# Patient Record
Sex: FEMALE | Race: ASIAN | NOT HISPANIC OR LATINO | URBAN - METROPOLITAN AREA
[De-identification: names, ages, dates, MRNs, and addresses within clinical notes are randomized per-mention and may not be internally consistent; named-entity substitution may affect disease eponyms.]

---

## 2017-07-26 ENCOUNTER — INPATIENT (INPATIENT)
Facility: HOSPITAL | Age: 82
LOS: 0 days | Discharge: HOME | End: 2017-07-27
Attending: INTERNAL MEDICINE | Admitting: INTERNAL MEDICINE
Payer: MEDICARE

## 2017-07-26 VITALS
RESPIRATION RATE: 18 BRPM | SYSTOLIC BLOOD PRESSURE: 180 MMHG | OXYGEN SATURATION: 98 % | TEMPERATURE: 98 F | HEART RATE: 78 BPM | DIASTOLIC BLOOD PRESSURE: 75 MMHG

## 2017-07-26 DIAGNOSIS — R06.02 SHORTNESS OF BREATH: ICD-10-CM

## 2017-07-26 DIAGNOSIS — I48.91 UNSPECIFIED ATRIAL FIBRILLATION: ICD-10-CM

## 2017-07-26 DIAGNOSIS — I25.10 ATHEROSCLEROTIC HEART DISEASE OF NATIVE CORONARY ARTERY WITHOUT ANGINA PECTORIS: ICD-10-CM

## 2017-07-26 DIAGNOSIS — I10 ESSENTIAL (PRIMARY) HYPERTENSION: ICD-10-CM

## 2017-07-26 DIAGNOSIS — E03.9 HYPOTHYROIDISM, UNSPECIFIED: ICD-10-CM

## 2017-07-26 DIAGNOSIS — E78.5 HYPERLIPIDEMIA, UNSPECIFIED: ICD-10-CM

## 2017-07-26 LAB
ALBUMIN SERPL ELPH-MCNC: 3.6 G/DL — SIGNIFICANT CHANGE UP (ref 3.3–5)
ALP SERPL-CCNC: 103 U/L — SIGNIFICANT CHANGE UP (ref 40–120)
ALT FLD-CCNC: 11 U/L — SIGNIFICANT CHANGE UP (ref 4–33)
ANISOCYTOSIS BLD QL: SLIGHT — SIGNIFICANT CHANGE UP
APPEARANCE UR: CLEAR — SIGNIFICANT CHANGE UP
APTT BLD: 35.9 SEC — SIGNIFICANT CHANGE UP (ref 27.5–37.4)
AST SERPL-CCNC: 22 U/L — SIGNIFICANT CHANGE UP (ref 4–32)
BACTERIA # UR AUTO: SIGNIFICANT CHANGE UP
BASE EXCESS BLDV CALC-SCNC: 1.3 MMOL/L — SIGNIFICANT CHANGE UP
BASOPHILS # BLD AUTO: 0.03 K/UL — SIGNIFICANT CHANGE UP (ref 0–0.2)
BASOPHILS NFR BLD AUTO: 0.6 % — SIGNIFICANT CHANGE UP (ref 0–2)
BASOPHILS NFR SPEC: 4.3 % — HIGH (ref 0–2)
BILIRUB SERPL-MCNC: 0.5 MG/DL — SIGNIFICANT CHANGE UP (ref 0.2–1.2)
BILIRUB UR-MCNC: NEGATIVE — SIGNIFICANT CHANGE UP
BLASTS # FLD: 0 % — SIGNIFICANT CHANGE UP (ref 0–0)
BLD GP AB SCN SERPL QL: NEGATIVE — SIGNIFICANT CHANGE UP
BLOOD GAS VENOUS - CREATININE: 0.74 MG/DL — SIGNIFICANT CHANGE UP (ref 0.5–1.3)
BLOOD UR QL VISUAL: NEGATIVE — SIGNIFICANT CHANGE UP
BUN SERPL-MCNC: 21 MG/DL — SIGNIFICANT CHANGE UP (ref 7–23)
CALCIUM SERPL-MCNC: 8.8 MG/DL — SIGNIFICANT CHANGE UP (ref 8.4–10.5)
CHLORIDE BLDV-SCNC: 108 MMOL/L — SIGNIFICANT CHANGE UP (ref 96–108)
CHLORIDE SERPL-SCNC: 105 MMOL/L — SIGNIFICANT CHANGE UP (ref 98–107)
CK MB BLD-MCNC: 2.05 NG/ML — SIGNIFICANT CHANGE UP (ref 1–4.7)
CK MB BLD-MCNC: 2.68 NG/ML — SIGNIFICANT CHANGE UP (ref 1–4.7)
CK MB BLD-MCNC: SIGNIFICANT CHANGE UP (ref 0–2.5)
CK MB BLD-MCNC: SIGNIFICANT CHANGE UP (ref 0–2.5)
CK SERPL-CCNC: 42 U/L — SIGNIFICANT CHANGE UP (ref 25–170)
CK SERPL-CCNC: 50 U/L — SIGNIFICANT CHANGE UP (ref 25–170)
CO2 SERPL-SCNC: 25 MMOL/L — SIGNIFICANT CHANGE UP (ref 22–31)
COLOR SPEC: SIGNIFICANT CHANGE UP
CREAT SERPL-MCNC: 0.71 MG/DL — SIGNIFICANT CHANGE UP (ref 0.5–1.3)
EOSINOPHIL # BLD AUTO: 0.09 K/UL — SIGNIFICANT CHANGE UP (ref 0–0.5)
EOSINOPHIL NFR BLD AUTO: 1.8 % — SIGNIFICANT CHANGE UP (ref 0–6)
EOSINOPHIL NFR FLD: 0.9 % — SIGNIFICANT CHANGE UP (ref 0–6)
FERRITIN SERPL-MCNC: 13.18 NG/ML — LOW (ref 15–150)
FOLATE SERPL-MCNC: 11.9 NG/ML — SIGNIFICANT CHANGE UP (ref 4.7–20)
GAS PNL BLDV: 138 MMOL/L — SIGNIFICANT CHANGE UP (ref 136–146)
GIANT PLATELETS BLD QL SMEAR: PRESENT — SIGNIFICANT CHANGE UP
GLUCOSE BLDV-MCNC: 101 — HIGH (ref 70–99)
GLUCOSE SERPL-MCNC: 100 MG/DL — HIGH (ref 70–99)
GLUCOSE UR-MCNC: NEGATIVE — SIGNIFICANT CHANGE UP
HAPTOGLOB SERPL-MCNC: 108 MG/DL — SIGNIFICANT CHANGE UP (ref 34–200)
HBA1C BLD-MCNC: 5.8 % — HIGH (ref 4–5.6)
HCO3 BLDV-SCNC: 25 MMOL/L — SIGNIFICANT CHANGE UP (ref 20–27)
HCT VFR BLD CALC: 20.7 % — CRITICAL LOW (ref 34.5–45)
HCT VFR BLD CALC: 27.3 % — LOW (ref 34.5–45)
HCT VFR BLDV CALC: 19.4 % — CRITICAL LOW (ref 34.5–45)
HGB BLD-MCNC: 5.8 G/DL — CRITICAL LOW (ref 11.5–15.5)
HGB BLD-MCNC: 7.9 G/DL — LOW (ref 11.5–15.5)
HGB BLDV-MCNC: 6.2 G/DL — CRITICAL LOW (ref 11.5–15.5)
HYPOCHROMIA BLD QL: SIGNIFICANT CHANGE UP
IMM GRANULOCYTES # BLD AUTO: 0.02 # — SIGNIFICANT CHANGE UP
IMM GRANULOCYTES NFR BLD AUTO: 0.4 % — SIGNIFICANT CHANGE UP (ref 0–1.5)
INR BLD: 1.52 — HIGH (ref 0.88–1.17)
IRON SATN MFR SERPL: 51 UG/DL — SIGNIFICANT CHANGE UP (ref 30–160)
IRON SATN MFR SERPL: 513 UG/DL — SIGNIFICANT CHANGE UP (ref 140–530)
KETONES UR-MCNC: NEGATIVE — SIGNIFICANT CHANGE UP
LACTATE BLDV-MCNC: 1.7 MMOL/L — SIGNIFICANT CHANGE UP (ref 0.5–2)
LDH SERPL L TO P-CCNC: 204 U/L — SIGNIFICANT CHANGE UP (ref 135–225)
LEUKOCYTE ESTERASE UR-ACNC: NEGATIVE — SIGNIFICANT CHANGE UP
LYMPHOCYTES # BLD AUTO: 1.17 K/UL — SIGNIFICANT CHANGE UP (ref 1–3.3)
LYMPHOCYTES # BLD AUTO: 23.9 % — SIGNIFICANT CHANGE UP (ref 13–44)
LYMPHOCYTES NFR SPEC AUTO: 12.2 % — LOW (ref 13–44)
MAGNESIUM SERPL-MCNC: 1.7 MG/DL — SIGNIFICANT CHANGE UP (ref 1.6–2.6)
MCHC RBC-ENTMCNC: 17.7 PG — LOW (ref 27–34)
MCHC RBC-ENTMCNC: 18.5 PG — LOW (ref 27–34)
MCHC RBC-ENTMCNC: 28 % — LOW (ref 32–36)
MCHC RBC-ENTMCNC: 28.9 % — LOW (ref 32–36)
MCV RBC AUTO: 63.1 FL — LOW (ref 80–100)
MCV RBC AUTO: 64.1 FL — LOW (ref 80–100)
METAMYELOCYTES # FLD: 0 % — SIGNIFICANT CHANGE UP (ref 0–1)
MICROCYTES BLD QL: SIGNIFICANT CHANGE UP
MONOCYTES # BLD AUTO: 0.48 K/UL — SIGNIFICANT CHANGE UP (ref 0–0.9)
MONOCYTES NFR BLD AUTO: 9.8 % — SIGNIFICANT CHANGE UP (ref 2–14)
MONOCYTES NFR BLD: 2.6 % — SIGNIFICANT CHANGE UP (ref 2–9)
MUCOUS THREADS # UR AUTO: SIGNIFICANT CHANGE UP
MYELOCYTES NFR BLD: 0 % — SIGNIFICANT CHANGE UP (ref 0–0)
NEUTROPHIL AB SER-ACNC: 80 % — HIGH (ref 43–77)
NEUTROPHILS # BLD AUTO: 3.11 K/UL — SIGNIFICANT CHANGE UP (ref 1.8–7.4)
NEUTROPHILS NFR BLD AUTO: 63.5 % — SIGNIFICANT CHANGE UP (ref 43–77)
NEUTS BAND # BLD: 0 % — SIGNIFICANT CHANGE UP (ref 0–6)
NITRITE UR-MCNC: POSITIVE — HIGH
NRBC # FLD: 0 — SIGNIFICANT CHANGE UP
NRBC # FLD: 0 — SIGNIFICANT CHANGE UP
NT-PROBNP SERPL-SCNC: 2905 PG/ML — SIGNIFICANT CHANGE UP
OB PNL STL: NEGATIVE — SIGNIFICANT CHANGE UP
OTHER - HEMATOLOGY %: 0 — SIGNIFICANT CHANGE UP
PCO2 BLDV: 48 MMHG — SIGNIFICANT CHANGE UP (ref 41–51)
PH BLDV: 7.36 PH — SIGNIFICANT CHANGE UP (ref 7.32–7.43)
PH UR: 7 — SIGNIFICANT CHANGE UP (ref 4.6–8)
PLATELET # BLD AUTO: 210 K/UL — SIGNIFICANT CHANGE UP (ref 150–400)
PLATELET # BLD AUTO: 213 K/UL — SIGNIFICANT CHANGE UP (ref 150–400)
PLATELET COUNT - ESTIMATE: NORMAL — SIGNIFICANT CHANGE UP
PMV BLD: 10.3 FL — SIGNIFICANT CHANGE UP (ref 7–13)
PMV BLD: 9.7 FL — SIGNIFICANT CHANGE UP (ref 7–13)
PO2 BLDV: 24 MMHG — LOW (ref 35–40)
POIKILOCYTOSIS BLD QL AUTO: SLIGHT — SIGNIFICANT CHANGE UP
POLYCHROMASIA BLD QL SMEAR: SLIGHT — SIGNIFICANT CHANGE UP
POTASSIUM BLDV-SCNC: 4 MMOL/L — SIGNIFICANT CHANGE UP (ref 3.4–4.5)
POTASSIUM SERPL-MCNC: 4.3 MMOL/L — SIGNIFICANT CHANGE UP (ref 3.5–5.3)
POTASSIUM SERPL-SCNC: 4.3 MMOL/L — SIGNIFICANT CHANGE UP (ref 3.5–5.3)
PROMYELOCYTES # FLD: 0 % — SIGNIFICANT CHANGE UP (ref 0–0)
PROT SERPL-MCNC: 7.6 G/DL — SIGNIFICANT CHANGE UP (ref 6–8.3)
PROT UR-MCNC: 10 — SIGNIFICANT CHANGE UP
PROTHROM AB SERPL-ACNC: 17.2 SEC — HIGH (ref 9.8–13.1)
RBC # BLD: 3.28 M/UL — LOW (ref 3.8–5.2)
RBC # BLD: 4.26 M/UL — SIGNIFICANT CHANGE UP (ref 3.8–5.2)
RBC # FLD: 20.9 % — HIGH (ref 10.3–14.5)
RBC # FLD: 23.9 % — HIGH (ref 10.3–14.5)
RBC CASTS # UR COMP ASSIST: SIGNIFICANT CHANGE UP (ref 0–?)
RETICS/RBC NFR: 1 % — SIGNIFICANT CHANGE UP (ref 0.5–2.5)
REVIEW TO FOLLOW: YES — SIGNIFICANT CHANGE UP
RH IG SCN BLD-IMP: POSITIVE — SIGNIFICANT CHANGE UP
RH IG SCN BLD-IMP: POSITIVE — SIGNIFICANT CHANGE UP
SAO2 % BLDV: 28.9 % — LOW (ref 60–85)
SODIUM SERPL-SCNC: 140 MMOL/L — SIGNIFICANT CHANGE UP (ref 135–145)
SP GR SPEC: 1.01 — SIGNIFICANT CHANGE UP (ref 1–1.03)
SQUAMOUS # UR AUTO: SIGNIFICANT CHANGE UP
TARGETS BLD QL SMEAR: SIGNIFICANT CHANGE UP
TROPONIN T SERPL-MCNC: < 0.06 NG/ML — SIGNIFICANT CHANGE UP (ref 0–0.06)
TROPONIN T SERPL-MCNC: < 0.06 NG/ML — SIGNIFICANT CHANGE UP (ref 0–0.06)
TSH SERPL-MCNC: 0.62 UIU/ML — SIGNIFICANT CHANGE UP (ref 0.27–4.2)
UIBC SERPL-MCNC: 462 UG/DL — HIGH (ref 110–370)
UROBILINOGEN FLD QL: NORMAL E.U. — SIGNIFICANT CHANGE UP (ref 0.1–0.2)
VARIANT LYMPHS # BLD: 0 % — SIGNIFICANT CHANGE UP
WBC # BLD: 4.9 K/UL — SIGNIFICANT CHANGE UP (ref 3.8–10.5)
WBC # BLD: 5.22 K/UL — SIGNIFICANT CHANGE UP (ref 3.8–10.5)
WBC # FLD AUTO: 4.9 K/UL — SIGNIFICANT CHANGE UP (ref 3.8–10.5)
WBC # FLD AUTO: 5.22 K/UL — SIGNIFICANT CHANGE UP (ref 3.8–10.5)
WBC UR QL: SIGNIFICANT CHANGE UP (ref 0–?)

## 2017-07-26 PROCEDURE — 71010: CPT | Mod: 26

## 2017-07-26 RX ORDER — DIVALPROEX SODIUM 500 MG/1
250 TABLET, DELAYED RELEASE ORAL
Qty: 0 | Refills: 0 | COMMUNITY

## 2017-07-26 RX ORDER — ACETAMINOPHEN 500 MG
650 TABLET ORAL EVERY 6 HOURS
Qty: 0 | Refills: 0 | Status: DISCONTINUED | OUTPATIENT
Start: 2017-07-26 | End: 2017-07-27

## 2017-07-26 RX ORDER — FUROSEMIDE 40 MG
1 TABLET ORAL
Qty: 0 | Refills: 0 | COMMUNITY

## 2017-07-26 RX ORDER — LABETALOL HCL 100 MG
10 TABLET ORAL ONCE
Qty: 0 | Refills: 0 | Status: COMPLETED | OUTPATIENT
Start: 2017-07-26 | End: 2017-07-26

## 2017-07-26 RX ORDER — ATORVASTATIN CALCIUM 80 MG/1
20 TABLET, FILM COATED ORAL AT BEDTIME
Qty: 0 | Refills: 0 | Status: DISCONTINUED | OUTPATIENT
Start: 2017-07-26 | End: 2017-07-27

## 2017-07-26 RX ORDER — LABETALOL HCL 100 MG
1 TABLET ORAL
Qty: 0 | Refills: 0 | COMMUNITY

## 2017-07-26 RX ORDER — CEFTRIAXONE 500 MG/1
1 INJECTION, POWDER, FOR SOLUTION INTRAMUSCULAR; INTRAVENOUS EVERY 24 HOURS
Qty: 0 | Refills: 0 | Status: DISCONTINUED | OUTPATIENT
Start: 2017-07-27 | End: 2017-07-27

## 2017-07-26 RX ORDER — FUROSEMIDE 40 MG
40 TABLET ORAL ONCE
Qty: 0 | Refills: 0 | Status: COMPLETED | OUTPATIENT
Start: 2017-07-26 | End: 2017-07-26

## 2017-07-26 RX ORDER — CEFTRIAXONE 500 MG/1
1 INJECTION, POWDER, FOR SOLUTION INTRAMUSCULAR; INTRAVENOUS ONCE
Qty: 0 | Refills: 0 | Status: COMPLETED | OUTPATIENT
Start: 2017-07-26 | End: 2017-07-26

## 2017-07-26 RX ORDER — ERGOCALCIFEROL 1.25 MG/1
50000 CAPSULE ORAL
Qty: 0 | Refills: 0 | Status: DISCONTINUED | OUTPATIENT
Start: 2017-07-26 | End: 2017-07-27

## 2017-07-26 RX ORDER — DILTIAZEM HCL 120 MG
1 CAPSULE, EXT RELEASE 24 HR ORAL
Qty: 0 | Refills: 0 | COMMUNITY

## 2017-07-26 RX ORDER — NITROGLYCERIN 6.5 MG
0.4 CAPSULE, EXTENDED RELEASE ORAL ONCE
Qty: 0 | Refills: 0 | Status: COMPLETED | OUTPATIENT
Start: 2017-07-26 | End: 2017-07-26

## 2017-07-26 RX ORDER — LABETALOL HCL 100 MG
100 TABLET ORAL
Qty: 0 | Refills: 0 | Status: DISCONTINUED | OUTPATIENT
Start: 2017-07-26 | End: 2017-07-27

## 2017-07-26 RX ORDER — FUROSEMIDE 40 MG
20 TABLET ORAL DAILY
Qty: 0 | Refills: 0 | Status: DISCONTINUED | OUTPATIENT
Start: 2017-07-26 | End: 2017-07-27

## 2017-07-26 RX ORDER — DILTIAZEM HCL 120 MG
120 CAPSULE, EXT RELEASE 24 HR ORAL DAILY
Qty: 0 | Refills: 0 | Status: DISCONTINUED | OUTPATIENT
Start: 2017-07-26 | End: 2017-07-27

## 2017-07-26 RX ORDER — ERGOCALCIFEROL 1.25 MG/1
1 CAPSULE ORAL
Qty: 0 | Refills: 0 | COMMUNITY

## 2017-07-26 RX ORDER — LEVOTHYROXINE SODIUM 125 MCG
125 TABLET ORAL DAILY
Qty: 0 | Refills: 0 | Status: DISCONTINUED | OUTPATIENT
Start: 2017-07-26 | End: 2017-07-27

## 2017-07-26 RX ORDER — ATORVASTATIN CALCIUM 80 MG/1
1 TABLET, FILM COATED ORAL
Qty: 0 | Refills: 0 | COMMUNITY

## 2017-07-26 RX ORDER — DIVALPROEX SODIUM 500 MG/1
250 TABLET, DELAYED RELEASE ORAL DAILY
Qty: 0 | Refills: 0 | Status: DISCONTINUED | OUTPATIENT
Start: 2017-07-26 | End: 2017-07-27

## 2017-07-26 RX ADMIN — Medication 10 MILLIGRAM(S): at 19:03

## 2017-07-26 RX ADMIN — Medication 0.4 MILLIGRAM(S): at 15:07

## 2017-07-26 RX ADMIN — Medication 40 MILLIGRAM(S): at 15:07

## 2017-07-26 RX ADMIN — ATORVASTATIN CALCIUM 20 MILLIGRAM(S): 80 TABLET, FILM COATED ORAL at 21:21

## 2017-07-26 RX ADMIN — CEFTRIAXONE 100 GRAM(S): 500 INJECTION, POWDER, FOR SOLUTION INTRAMUSCULAR; INTRAVENOUS at 15:30

## 2017-07-26 RX ADMIN — Medication 20 MILLIGRAM(S): at 21:21

## 2017-07-26 NOTE — H&P ADULT - PROBLEM SELECTOR PLAN 1
SOB likely secondary to symptomatic anemia SOB possibly due to new onset heart failure versus symptomatic anemia  BNP elevated and H&H decreased  Will order JONH found to have symptomatic anemia, type and screen  BNP elevated and H&H decreased  Will order JONH to r/o heart failure  2 units of PRBC's to be transfused, Heme c/s Dr. Cabrera, anemia w/u ordered

## 2017-07-26 NOTE — H&P ADULT - NEUROLOGICAL DETAILS
deep reflexes intact/sensation intact/normal strength/responds to pain/responds to verbal commands/alert and oriented x 3

## 2017-07-26 NOTE — H&P ADULT - RS GEN PE MLT RESP DETAILS PC
good air movement/clear to auscultation bilaterally/normal/breath sounds equal/airway patent/respirations non-labored respirations non-labored/clear to auscultation bilaterally/airway patent/good air movement/no chest wall tenderness/breath sounds equal/normal

## 2017-07-26 NOTE — H&P ADULT - NSHPSOCIALHISTORY_GEN_ALL_CORE
Marital Status:  and living with     Tobacco Use: Denies current or former    ETOH Use: Denies    Illicit Drug Use: Denies

## 2017-07-26 NOTE — ED PROVIDER NOTE - PROGRESS NOTE DETAILS
Spoke to Dr. Arriaga - Said to admit to Dr. Allen Srivastava.   Spoke to Dr. Sal - Asked to call [phone] and admit to Dr. Arriaga.

## 2017-07-26 NOTE — H&P ADULT - MUSCULOSKELETAL
details… detailed exam normal strength/normal/no joint swelling/no joint erythema/no joint warmth/ROM intact/no calf tenderness

## 2017-07-26 NOTE — H&P ADULT - PROBLEM SELECTOR PLAN 2
A-fib with normal rate on ECG  Currently anticoagulated on Coumadin   Serial ECGs A-fib with normal rate on ECG  Currently anticoagulated on Eliquis  Serial ECGs A-fib with normal rate on ECG  Currently anticoagulated on Eliquis, hold Eliquis for now due to bleeding  rate control, monitor H & H  Serial ECGs

## 2017-07-26 NOTE — ED PROVIDER NOTE - ATTENDING CONTRIBUTION TO CARE
Hx of CHF non compliant with Lasix, c/o increased pedal edema and shortness of breath, no fever. EKG atrial fibrillation, NSSTWchanges: lungs bibasilar crackles, 2+ pitting LE edema: Imp: CHF exacerbation. Plan: admit for diuresis, r/o MI

## 2017-07-26 NOTE — CONSULT NOTE ADULT - SUBJECTIVE AND OBJECTIVE BOX
House Cardiology Initial Consult  Consult Spectra [phone]    CHIEF COMPLAINT:      HISTORY OF PRESENT ILLNESS:  88yFemale multiple medical comorbidities significant for afib on coumadin, CAD s/p 9 stents (last in 2007), HTN, HLD, HFpEF (65-70% in 2015), CVA, hypothyroid, small bowel resection (2014) presenting to ED for Hg5.8 in context of SOBx3 weeks and melena. Cardiology consulted for possible need for reversal of coumadin in context of GIB as well as management of possible acute decompensated HF. FOBT neg.     Allergies    No Known Allergies    Intolerances    	    MEDICATIONS:    cefTRIAXone   IVPB 1 Gram(s) IV Intermittent once                PAST MEDICAL & SURGICAL HISTORY:  SBO (small bowel obstruction)  CVA (cerebral vascular accident): no residual weakness.  Uterus cancer: 1996 with some radiation post - op  Osteoarthritis  GERD (gastroesophageal reflux disease)  UTI (urinary tract infection): recent had cipro for 1 week with no follow - up urine culture - pending from [facility]  Stented coronary artery: total 9 - last 2007 [facility]  - on coumadin to discontinue  Hypothyroid: last TFT&#x27;s 1/5/15 - normal  Hyperlipidemia  HTN (hypertension)  S/P small bowel resection: Diagnostic laparoscopy/Ex-lap/Small bowel resection/enterotomy repair 9/18/2014 at [facility] Dr. Rea  H/O total hysterectomy: 1996  FHx: total knee replacement: left 2005  S/P total knee replacement: right - 1995  History of partial thyroidectomy: 1973  S/P cholecystectomy: 2008  After cataract, bilateral: 2010 - bilateral      FAMILY HISTORY:  Family history of diabetes mellitus  Family history of diabetes mellitus      SOCIAL HISTORY:    [ ] Non-smoker  [ ] Smoker  [ ] Alcohol    Review of Systems:  Constitutional: [ ] Fever [ ] Chills [ ] Fatigue [ ] Weight change   HEENT: [ ] Blurred vision [ ] Eye Pain [ ] Headache [ ] Runny nose [ ] Sore Throat   Respiratory: [ ] Cough [ ] Wheezing [ ] Shortness of breath  Cardiovascular: [ ] Chest Pain [ ] Palpitations [ ] MENON [ ] PND [ ] Orthopnea  Gastrointestinal: [ ] Abdominal Pain [ ] Diarrhea [ ] Constipation [ ] Hemorrhoids [ ] Nausea [ ] Vomiting  Genitourinary: [ ] Nocturia [ ] Dysuria [ ] Incontinence  Extremities: [ ] Swelling [ ] Joint Pain  Neurologic: [ ] Focal deficit [ ] Paresthesias [ ] Syncope  Skin: [ ] Rash [ ] Ecchymoses [ ] Wounds [ ] Lesions  Psychiatry: [ ] Depression [ ] Suicidal/Homicidal Ideation [ ] Anxiety [ ] Sleep Disturbances  [ ] 10 point review of systems is otherwise negative except as mentioned above            [ ]Unable to obtain  PHYSICAL EXAM:  T(C): 36.7 (07-26-17 @ 12:26), Max: 36.7 (07-26-17 @ 12:26)  HR: 82 (07-26-17 @ 13:33) (78 - 82)  BP: 198/65 (07-26-17 @ 13:33) (180/75 - 198/65)  RR: 20 (07-26-17 @ 13:33) (18 - 20)  SpO2: 100% (07-26-17 @ 13:33) (98% - 100%)  Wt(kg): --  I&O's Summary      Appearance: Normal	  HEENT:   Normal oral mucosa, PERRL, EOMI	  Cardiovascular: Normal S1 S2, No JVD, No murmurs, No edema  Respiratory: Lungs clear to auscultation	  Psychiatry: A & O x 3, Mood & affect appropriate  Gastrointestinal:  soft nt nd normoactive bs	  Skin: No rashes, No ecchymoses, No cyanosis	  Neurologic: grossly non-focal  Extremities: Normal range of motion, No clubbing, cyanosis or  Vascular: Peripheral pulses palpable 2+ bilaterally      LABS:	 	    CBC Full  -  ( 26 Jul 2017 13:16 )  WBC Count : 4.90 K/uL  Hemoglobin : 5.8 g/dL  Hematocrit : 20.7 %  Platelet Count - Automated : 210 K/uL  Mean Cell Volume : 63.1 fL  Mean Cell Hemoglobin : 17.7 pg  Mean Cell Hemoglobin Concentration : 28.0 %  Auto Neutrophil # : 3.11 K/uL  Auto Lymphocyte # : 1.17 K/uL  Auto Monocyte # : 0.48 K/uL  Auto Eosinophil # : 0.09 K/uL  Auto Basophil # : 0.03 K/uL  Auto Neutrophil % : 63.5 %  Auto Lymphocyte % : 23.9 %  Auto Monocyte % : 9.8 %  Auto Eosinophil % : 1.8 %  Auto Basophil % : 0.6 %    07-26    140  |  105  |  21  ----------------------------<  100<H>  4.3   |  25  |  0.71    Ca    8.8      26 Jul 2017 13:22    TPro  7.6  /  Alb  3.6  /  TBili  0.5  /  DBili  x   /  AST  22  /  ALT  11  /  AlkPhos  103  07-26      proBNP: Serum Pro-Brain Natriuretic Peptide: 2905 pg/mL (07-26 @ 13:22)    Lipid Profile:   HgA1c:   TSH: Thyroid Stimulating Hormone, Serum: 0.62 uIU/mL (07-26 @ 13:26)        CARDIAC MARKERS:            TELEMETRY: 	    ECG: a fib.  RADIOLOGY:  OTHER: 	    PREVIOUS DIAGNOSTIC TESTING:    [ ] Echocardiogram:  [ ] Catheterization:  [ ] Stress Test:  	  	  ASSESSMENT/PLAN: 	    88yFemale multiple medical comorbidities significant for afib on coumadin, CAD s/p 9 stents (last in 2007), HTN, HLD, HFpEF (65-70% in 2015), CVA, hypothyroid, small bowel resection (2014) presenting to ED for Hg5.8 in context of SOBx3 weeks and melena. Cardiology consulted for possible need for reversal of coumadin in context of GIB as well as management of possible acute decompensated HF. FOBT neg. Current EKG with a fib. House Cardiology Initial Consult  Consult Spectra [phone]    CHIEF COMPLAINT: GIB in context of AC for a fib, increasing SOB in context of hx of CHF.       HISTORY OF PRESENT ILLNESS:    88yFemale multiple medical comorbidities significant for afib on eliquis (taking up until arrival here), CAD s/p 9 stents (last in 2007), HTN, HLD, HFpEF (65-70% in 2015), CVA, hypothyroid, small bowel resection (2014) presenting to ED for Hg5.8 in context of SOBx3 weeks and melena. Cardiology consulted for possible need for reversal of coumadin in context of GIB as well as management of possible acute decompensated HF. Per patient's son, patient began to have progressive SOB since 3 month ago. At baseline, patient able to walk throughout the house on one floor (no stairs due to joint pain) without difficulty, but this past week, she has been unable to walk short distances without becoming SOB. She was noted to also have passed a blood clot about a month ago, but family denied any further episodes of BRBPR, melena, hemoptysis, hematemesis. Last colonoscopy last year, which reportedly was normal. Denied constipation, diarrhea, abdominal pain, fever, chills. Of note, patient followed by Dr. Farley outpatient for CHF.     Allergies    No Known Allergies    Intolerances    	    MEDICATIONS:    cefTRIAXone   IVPB 1 Gram(s) IV Intermittent once      PAST MEDICAL & SURGICAL HISTORY:  SBO (small bowel obstruction)  CVA (cerebral vascular accident): no residual weakness.  Uterus cancer: 1996 with some radiation post - op  Osteoarthritis  GERD (gastroesophageal reflux disease)  UTI (urinary tract infection): recent had cipro for 1 week with no follow - up urine culture - pending from [facility]  Stented coronary artery: total 9 - last 2007 [facility]  - on coumadin to discontinue  Hypothyroid: last TFT&#x27;s 1/5/15 - normal  Hyperlipidemia  HTN (hypertension)  S/P small bowel resection: Diagnostic laparoscopy/Ex-lap/Small bowel resection/enterotomy repair 9/18/2014 at [facility] Dr. Rea  H/O total hysterectomy: 1996  FHx: total knee replacement: left 2005  S/P total knee replacement: right - 1995  History of partial thyroidectomy: 1973  S/P cholecystectomy: 2008  After cataract, bilateral: 2010 - bilateral      FAMILY HISTORY:  Family history of diabetes mellitus  Family history of diabetes mellitus      SOCIAL HISTORY:    [ ] Non-smoker  [ ] Smoker  [ ] Alcohol    Review of Systems:  Constitutional: [ ] Fever [ ] Chills [ ] Fatigue [ ] Weight change   HEENT: [ ] Blurred vision [ ] Eye Pain [ ] Headache [ ] Runny nose [ ] Sore Throat   Respiratory: [ ] Cough [ ] Wheezing [ ] Shortness of breath  Cardiovascular: [ ] Chest Pain [ ] Palpitations [ ] MENON [ ] PND [ ] Orthopnea  Gastrointestinal: [ ] Abdominal Pain [ ] Diarrhea [ ] Constipation [ ] Hemorrhoids [ ] Nausea [ ] Vomiting  Genitourinary: [ ] Nocturia [ ] Dysuria [ ] Incontinence  Extremities: [ ] Swelling [ ] Joint Pain  Neurologic: [ ] Focal deficit [ ] Paresthesias [ ] Syncope  Skin: [ ] Rash [ ] Ecchymoses [ ] Wounds [ ] Lesions  Psychiatry: [ ] Depression [ ] Suicidal/Homicidal Ideation [ ] Anxiety [ ] Sleep Disturbances  [ ] 10 point review of systems is otherwise negative except as mentioned above            [ ]Unable to obtain  PHYSICAL EXAM:  T(C): 36.7 (07-26-17 @ 12:26), Max: 36.7 (07-26-17 @ 12:26)  HR: 82 (07-26-17 @ 13:33) (78 - 82)  BP: 198/65 (07-26-17 @ 13:33) (180/75 - 198/65)  RR: 20 (07-26-17 @ 13:33) (18 - 20)  SpO2: 100% (07-26-17 @ 13:33) (98% - 100%)  Wt(kg): --  I&O's Summary      Appearance: Normal	  HEENT:   Normal oral mucosa, PERRL, EOMI	  Cardiovascular: Normal S1 S2, No JVD, No murmurs, No edema  Respiratory: Lungs clear to auscultation	  Psychiatry: A & O x 3, Mood & affect appropriate  Gastrointestinal:  soft nt nd normoactive bs	  Skin: No rashes, No ecchymoses, No cyanosis	  Neurologic: grossly non-focal  Extremities: Normal range of motion, No clubbing, cyanosis or  Vascular: Peripheral pulses palpable 2+ bilaterally      LABS:	 	    CBC Full  -  ( 26 Jul 2017 13:16 )  WBC Count : 4.90 K/uL  Hemoglobin : 5.8 g/dL  Hematocrit : 20.7 %  Platelet Count - Automated : 210 K/uL  Mean Cell Volume : 63.1 fL  Mean Cell Hemoglobin : 17.7 pg  Mean Cell Hemoglobin Concentration : 28.0 %  Auto Neutrophil # : 3.11 K/uL  Auto Lymphocyte # : 1.17 K/uL  Auto Monocyte # : 0.48 K/uL  Auto Eosinophil # : 0.09 K/uL  Auto Basophil # : 0.03 K/uL  Auto Neutrophil % : 63.5 %  Auto Lymphocyte % : 23.9 %  Auto Monocyte % : 9.8 %  Auto Eosinophil % : 1.8 %  Auto Basophil % : 0.6 %    07-26    140  |  105  |  21  ----------------------------<  100<H>  4.3   |  25  |  0.71    Ca    8.8      26 Jul 2017 13:22    TPro  7.6  /  Alb  3.6  /  TBili  0.5  /  DBili  x   /  AST  22  /  ALT  11  /  AlkPhos  103  07-26      proBNP: Serum Pro-Brain Natriuretic Peptide: 2905 pg/mL (07-26 @ 13:22)    Lipid Profile:   HgA1c:   TSH: Thyroid Stimulating Hormone, Serum: 0.62 uIU/mL (07-26 @ 13:26)        CARDIAC MARKERS:      TELEMETRY: 	    ECG: a fib.  RADIOLOGY:  OTHER: 	    PREVIOUS DIAGNOSTIC TESTING:    [ ] Echocardiogram:  [ ] Catheterization:  [ ] Stress Test:  	  	  ASSESSMENT/PLAN: 	    88yFemale multiple medical comorbidities significant for afib on eliquis (taking up until arrival here), CAD s/p 9 stents (last in 2007), HTN, HLD, HFpEF (65-70% in 2015), CVA, hypothyroid, small bowel resection (2014) presenting to ED for Hg5.8 in context of SOBx3 weeks and melena. Cardiology consulted for AC management in context of GIB and management of ADCHF.   -patient clinically not fluid overloaded, unlikely acute decompensated HF.   -  -continue dilitiazem for rate control for a fib. House Cardiology Initial Consult  Consult Spectra [phone]    CHIEF COMPLAINT: GIB in context of AC for a fib, increasing SOB in context of hx of CHF.       HISTORY OF PRESENT ILLNESS:    88yFemale multiple medical comorbidities significant for afib on eliquis (taking up until arrival here), CAD s/p 9 stents (last in 2007), HTN, HLD, HFpEF (65-70% in 2015), CVA, hypothyroid, small bowel resection (2014) presenting to ED for Hg5.8 (baseline 10-11) in context of SOBx3 weeks and melena. Cardiology consulted for possible need for reversal of coumadin in context of GIB as well as management of possible acute decompensated HF. Per patient's son, patient began to have progressive SOB since 3 month ago. At baseline, patient able to walk throughout the house on one floor (no stairs due to joint pain) without difficulty, but this past week, she has been unable to walk short distances without becoming SOB. She was noted to also have passed a blood clot about a month ago, but family denied any further episodes of BRBPR, melena, hemoptysis, hematemesis. Last colonoscopy last year, which reportedly was normal. Denied constipation, diarrhea, abdominal pain, fever, chills, weight loss. Of note, patient followed by Dr. Farley outpatient for CHF. Patient was prescribed 20 lasix due to SOB, but she was not taking the prescribed dosages because "it made me pee too much."    Allergies    No Known Allergies    Intolerances    	    MEDICATIONS:    cefTRIAXone   IVPB 1 Gram(s) IV Intermittent once      PAST MEDICAL & SURGICAL HISTORY:  SBO (small bowel obstruction)  CVA (cerebral vascular accident): no residual weakness.  Uterus cancer: 1996 with some radiation post - op  Osteoarthritis  GERD (gastroesophageal reflux disease)  UTI (urinary tract infection): recent had cipro for 1 week with no follow - up urine culture - pending from [facility]  Stented coronary artery: total 9 - last 2007 [facility]  - on coumadin to discontinue  Hypothyroid: last TFT&#x27;s 1/5/15 - normal  Hyperlipidemia  HTN (hypertension)  S/P small bowel resection: Diagnostic laparoscopy/Ex-lap/Small bowel resection/enterotomy repair 9/18/2014 at [facility] Dr. Rea  H/O total hysterectomy: 1996  FHx: total knee replacement: left 2005  S/P total knee replacement: right - 1995  History of partial thyroidectomy: 1973  S/P cholecystectomy: 2008  After cataract, bilateral: 2010 - bilateral      FAMILY HISTORY:  Family history of diabetes mellitus  Family history of diabetes mellitus      SOCIAL HISTORY:    [ ] Non-smoker  [ ] Smoker  [ ] Alcohol    Review of Systems: as noted in HPI. Negative otherwise.   Constitutional: [ ] Fever [ ] Chills [ ] Fatigue [ ] Weight change   HEENT: [ ] Blurred vision [ ] Eye Pain [ ] Headache [ ] Runny nose [ ] Sore Throat   Respiratory: [ ] Cough [ ] Wheezing [ ] Shortness of breath  Cardiovascular: [ ] Chest Pain [ ] Palpitations [ ] MENON [ ] PND [ ] Orthopnea  Gastrointestinal: [ ] Abdominal Pain [ ] Diarrhea [ ] Constipation [ ] Hemorrhoids [ ] Nausea [ ] Vomiting  Genitourinary: [ ] Nocturia [ ] Dysuria [ ] Incontinence  Extremities: [ ] Swelling [ ] Joint Pain  Neurologic: [ ] Focal deficit [ ] Paresthesias [ ] Syncope  Skin: [ ] Rash [ ] Ecchymoses [ ] Wounds [ ] Lesions  Psychiatry: [ ] Depression [ ] Suicidal/Homicidal Ideation [ ] Anxiety [ ] Sleep Disturbances  [ ] 10 point review of systems is otherwise negative except as mentioned above            [ ]Unable to obtain  PHYSICAL EXAM:  T(C): 36.7 (07-26-17 @ 12:26), Max: 36.7 (07-26-17 @ 12:26)  HR: 82 (07-26-17 @ 13:33) (78 - 82)  BP: 198/65 (07-26-17 @ 13:33) (180/75 - 198/65)  RR: 20 (07-26-17 @ 13:33) (18 - 20)  SpO2: 100% (07-26-17 @ 13:33) (98% - 100%)  Wt(kg): --  I&O's Summary      Appearance: Normal	  HEENT:   Normal oral mucosa, PERRL, EOMI	  Cardiovascular: Normal S1 S2, No JVD, No murmurs, +1 pitting edema.   Respiratory: Bibasilar crackles.   Psychiatry: A & O x 3, Mood & affect appropriate  Gastrointestinal:  soft nt nd normoactive bs	  Skin: No rashes, No ecchymoses, No cyanosis	  Neurologic: grossly non-focal  Extremities: Normal range of motion, No clubbing, cyanosis or  Vascular: Peripheral pulses palpable 2+ bilaterally      LABS:	 	    CBC Full  -  ( 26 Jul 2017 13:16 )  WBC Count : 4.90 K/uL  Hemoglobin : 5.8 g/dL  Hematocrit : 20.7 %  Platelet Count - Automated : 210 K/uL  Mean Cell Volume : 63.1 fL  Mean Cell Hemoglobin : 17.7 pg  Mean Cell Hemoglobin Concentration : 28.0 %  Auto Neutrophil # : 3.11 K/uL  Auto Lymphocyte # : 1.17 K/uL  Auto Monocyte # : 0.48 K/uL  Auto Eosinophil # : 0.09 K/uL  Auto Basophil # : 0.03 K/uL  Auto Neutrophil % : 63.5 %  Auto Lymphocyte % : 23.9 %  Auto Monocyte % : 9.8 %  Auto Eosinophil % : 1.8 %  Auto Basophil % : 0.6 %    07-26    140  |  105  |  21  ----------------------------<  100<H>  4.3   |  25  |  0.71    Ca    8.8      26 Jul 2017 13:22    TPro  7.6  /  Alb  3.6  /  TBili  0.5  /  DBili  x   /  AST  22  /  ALT  11  /  AlkPhos  103  07-26      proBNP: Serum Pro-Brain Natriuretic Peptide: 2905 pg/mL (07-26 @ 13:22)    Lipid Profile:   HgA1c:   TSH: Thyroid Stimulating Hormone, Serum: 0.62 uIU/mL (07-26 @ 13:26)        CARDIAC MARKERS:      TELEMETRY: 	    ECG: a fib.  RADIOLOGY:  < from: Xray Chest 1 View AP- PORTABLE-Urgent (07.26.17 @ 14:40) >  Lungs suboptimally inflated.    Hazy indistinct bilateral CP angles could be due in part to overlying   soft tissues and/or small pleural reactions. Grossly clear remaining   visualized lungs. No pneumothorax.    Stable cardiomegaly and aortic calcifications.    Trachea midline.    Generalized osteopenia and spinal bilateral shoulder degenerative changes   again noted.    < end of copied text >    OTHER: 	    PREVIOUS DIAGNOSTIC TESTING:    [ ] Echocardiogram:  [ ] Catheterization:  [ ] Stress Test:  	  	  ASSESSMENT/PLAN: 	    88yFemale multiple medical comorbidities significant for afib on eliquis (taking up until arrival here), CAD s/p 9 stents (last in 2007), HTN, HLD, HFpEF (65-70% in 2015), CVA, hypothyroid, small bowel resection (2014) presenting to ED for Hg5.8 in context of SOBx3 weeks and melena. Cardiology consulted for AC management in context of acute blood loss and management of ADCHF. ANDREAS-VASC 8. Source acute blood loss unknown (?anastomotic site).     -prolonged transfusion to prevent overload (1u over 3-4hours).  -hold AC in context dropping Hgb, GI eval to identify source of bleed before resuming.

## 2017-07-26 NOTE — H&P ADULT - NSHPPHYSICALEXAM_GEN_ALL_CORE
Vital Signs Last 24 Hrs  T(C): 36.4 (26 Jul 2017 16:30), Max: 36.7 (26 Jul 2017 12:26)  T(F): 97.6 (26 Jul 2017 16:30), Max: 98 (26 Jul 2017 12:26)  HR: 67 (26 Jul 2017 16:30) (67 - 82)  BP: 196/70 (26 Jul 2017 16:30) (180/75 - 198/65)  BP(mean): --  RR: 19 (26 Jul 2017 16:30) (18 - 20)  SpO2: 100% (26 Jul 2017 16:30) (98% - 100%)    EKG: Afib @ 77, T inv III

## 2017-07-26 NOTE — ED PROVIDER NOTE - OBJECTIVE STATEMENT
88F hx of Afib on coumadin, HTN and CAD s/p 4 stents presents with SOB. Pt had intermittent difficulty with breathing for the past 3 weeks but the last week, the SOB has been more constant. Pt ambulates with walker or a cane but for the past week, difficulty of breathing has been more significant with exertion as well as at rest. Denies chest pain, cough, fever, chills, nausea or vomiting. Endorsed to seeing dark stool a month ago but did not seek medical assistance. Endorsed to taking Lasix but not regularly because the medication made her urinate and she is tired of going to bathroom frequently. Pt went to her PMD today because of SOB and was told that her hemoglobin was low (son was not sure how low). Pt's baseline Hb is 11 to 12. Pt denies any active bleeding currently. No hx of cancer, no recent surgery, no recent travel, no coughing blood, no hx of blood clots in the past and no unilateral leg swelling. 88F hx of Afib on coumadin, HTN and CAD s/p 4 stents presents with SOB. Pt had intermittent difficulty with breathing for the past 3 weeks but the last week, the SOB has been more constant. Pt ambulates with walker or a cane but for the past week, difficulty of breathing has been more significant with exertion as well as at rest. Denies chest pain, cough, fever, chills, nausea or vomiting. Endorsed to seeing dark stool a month ago but did not seek medical assistance. Endorsed to taking Lasix but not regularly because the medication made her urinate and she is tired of going to bathroom frequently. Pt went to her PMD today because of SOB and was told that her hemoglobin was low (son was not sure how low). Pt's baseline Hb is 11 to 12. Pt denies any active bleeding currently. No hx of cancer, no recent surgery, no recent travel, no coughing blood, no hx of blood clots in the past and no unilateral leg swelling.    Cardiologist: Dr. River  PMD: Dr. Arriaga

## 2017-07-26 NOTE — H&P ADULT - PROBLEM SELECTOR PLAN 4
Monitor BP  Continue current meds   DASH diet recommended Monitor BP  Continue Labetalol  DASH diet recommended Monitor BP  Continue meds, adjust as needed  DASH diet recommended

## 2017-07-26 NOTE — ED ADULT NURSE NOTE - OBJECTIVE STATEMENT
Break RN coverage: 87 y/o female pt accompanied by son, aox3, ambulatory with a cane at baseline, presents to the ed with c/o sob that started 2-3 weeks ago, worse this past week, was also sent by pmd for low H&H, pt sts she saw bleeding with her BM weeks ago, denies any bleeding at this time. Pt with BLE swelling, takes Lasix but not very compliant because she "gets tired of going to the bathroom" Pt denies any chest pain, cough, fever, chills, abd pain, n/v/d. Not in respiratory distress, sats at 100% MD mayte CAMPOS done, SL placed, labs sent, VS as noted, connected to the monitor:JAGUAR Purdy, son at bedside.

## 2017-07-26 NOTE — H&P ADULT - PROBLEM SELECTOR PLAN 5
Will check FLP  Continue current meds  Low cholesterol diet recommended Will check FLP  Continue Atorvastatin  Low cholesterol diet recommended

## 2017-07-26 NOTE — ED PROVIDER NOTE - PSH
After cataract, bilateral  2010 - bilateral  FHx: total knee replacement  left 2005  H/O total hysterectomy  1996  History of partial thyroidectomy  1973  S/P cholecystectomy  2008  S/P small bowel resection  Diagnostic laparoscopy/Ex-lap/Small bowel resection/enterotomy repair 9/18/2014 at [facility] Dr. Rea  S/P total knee replacement  right - 1995

## 2017-07-26 NOTE — H&P ADULT - NEGATIVE OPHTHALMOLOGIC SYMPTOMS
no pain L/no pain R/no loss of vision L/no loss of vision R/no diplopia/no blurred vision L/no photophobia/no blurred vision R

## 2017-07-26 NOTE — H&P ADULT - FAMILY HISTORY
<<-----Click on this checkbox to enter Family History Family history of diabetes mellitus     Child  Still living? Yes, Estimated age: Age Unknown  Family history of heart valve abnormality, Age at diagnosis: Age Unknown

## 2017-07-26 NOTE — H&P ADULT - NEGATIVE GASTROINTESTINAL SYMPTOMS
no diarrhea/no hematochezia/no constipation/no vomiting/no nausea/no change in bowel habits/no abdominal pain

## 2017-07-26 NOTE — H&P ADULT - NEGATIVE CARDIOVASCULAR SYMPTOMS
no palpitations/no chest pain/no claudication/no paroxysmal nocturnal dyspnea/no orthopnea/no peripheral edema

## 2017-07-26 NOTE — H&P ADULT - NEGATIVE NEUROLOGICAL SYMPTOMS
no generalized seizures/no loss of consciousness/no vertigo/no syncope/no transient paralysis/no paresthesias/no headache/no loss of sensation

## 2017-07-26 NOTE — CONSULT NOTE ADULT - ATTENDING COMMENTS
High risk for recurrent CVA. However, AC currently held due to suspected bleeding. Will need to readdress AC once bleeding source is identified.    Judicious fluid management in setting of transfusion.

## 2017-07-26 NOTE — H&P ADULT - ASSESSMENT
89 y/o female with PMHx of a-fib, HTN, HLD, CAD s/p stent placement 7-8 years ago, hypothyroidism, and uterine cancer presenting to the ED with shortness of breath likely secondary to symptomatic anemia. 89 y/o female with PMHx of a-fib, HTN, HLD, CAD s/p stent placement 7-8 years ago, hypothyroidism, and uterine cancer presenting to the ED with shortness of breath admitted for r/o new onset heart failure versus symptomatic anemia 87 y/o female, with a PmHx of A-fib on Eliquis, [place], HTN, HLD, CAD s/p stent placement 7-8 years ago, Hypothyroidism secondary to Partial Thyroidectomy, and Uterine cancer s/p Hysterectomy, Cholecystectomy, is being admitted to telemetry for symptomatic anemia.

## 2017-07-26 NOTE — H&P ADULT - PSH
After cataract, bilateral  2010 - bilateral  FHx: total knee replacement  left 2005  H/O total hysterectomy  1996  History of partial thyroidectomy  1973  S/P cholecystectomy  2008  S/P small bowel resection  Diagnostic laparoscopy/Ex-lap/Small bowel resection/enterotomy repair 9/18/2014 at [facility] Dr. Rea  S/P total knee replacement  right - 1995

## 2017-07-26 NOTE — H&P ADULT - PROBLEM SELECTOR PLAN 6
Will check TSH and free T3 / T4  Continue current meds Will check TSH and free T3 / T4  Continue Levothyroxine

## 2017-07-26 NOTE — H&P ADULT - CARDIOVASCULAR DETAILS
positive S2/positive S1/Irregular rhythm with normal rate positive S1/Irregular rhythm with normal rate/positive S2/irregular rate and rhythm

## 2017-07-26 NOTE — H&P ADULT - HISTORY OF PRESENT ILLNESS
87 y/o female with PMHx of a-fib, HTN, HLD, CAD s/p stent placement 7-8 years ago, hypothyroidism, and uterine cancer presenting to the ED with shortness of breath. Hx provided by the patient's son. Patient has been experiencing SOB for the past 3 weeks, with it recently worsening last night. She reports that the SOB is worse with exertion, but denies any orthopnea, PND, or cough. Patient is typically able to get around with her walker/cane without any difficulty breathing, but her current SOB is causing her to feel weak and fatigued. Patient was seen this morning for the SOB and was told to come to the ED for a low hemoglobin level. Patient also reports that she passed dark blood clots in her stool approximately 6 weeks ago, but was not subsequently evaluated. She denies having any similar episodes since. Patient denies chest pain, palpitations, diaphoresis, syncope, dizziness, HA, numbness/tingling, fever, chills, abdominal pain, and N/V/D. 87 y/o female, with a PmHx of A-fib on Eliquis, [place], HTN, HLD, CAD s/p stent placement 7-8 years ago, Hypothyroidism secondary to Partial Thyroidectomy, and Uterine cancer s/p Hysterectomy, Cholecystectomy, presenting to the ED with shortness of breath. Patient has been experiencing SOB for the past 3 weeks, but last night it had gotten worse so she had gone to Dr. Cabrera's office for an evaluation and was then referred to [facility] ED for a low H & H. She reports that the SOB is worse with exertion, but denies any orthopnea, PND, or cough. Patient is typically able to get around with her walker/cane without any difficulty breathing, but her current SOB is causing her to feel weak and fatigued. Patient was seen this morning for the SOB and was told to come to the ED for a low hemoglobin level. Patient also reports that she passed dark blood clots in her stool approximately 6 weeks ago, but was not subsequently evaluated. She denies having any similar episodes since. Patient denies chest pain, palpitations, diaphoresis, syncope, dizziness, HA, numbness/tingling, fever, chills, abdominal pain, and N/V/D. Currently, she appears comfortable with family at her bedside. She is being admitted to telemetry for symptomatic anemia with a Hgb of 5.8/20.7.

## 2017-07-26 NOTE — ED PROVIDER NOTE - PMH
CVA (cerebral vascular accident)  no residual weakness.  GERD (gastroesophageal reflux disease)    HTN (hypertension)    Hyperlipidemia    Hypothyroid  last TFT's 1/5/15 - normal  Osteoarthritis    SBO (small bowel obstruction)    Stented coronary artery  total 9 - last 2007 [facility]  - on coumadin to discontinue  Uterus cancer  1996 with some radiation post - op  UTI (urinary tract infection)  recent had cipro for 1 week with no follow - up urine culture - pending from pst

## 2017-07-26 NOTE — CONSULT NOTE ADULT - CONSULT REASON
Reversal of coumadin in context of GIB, management of ADCHF. AC management in context of possible GIB, management of possible ADCHF.

## 2017-07-26 NOTE — ED PROVIDER NOTE - PHYSICAL EXAMINATION
MELVINA (Chaperoned by Minerva Penn). Crackles at the base, intermittent wheezing, pedal edema b/l and pitting.

## 2017-07-27 ENCOUNTER — TRANSCRIPTION ENCOUNTER (OUTPATIENT)
Age: 82
End: 2017-07-27

## 2017-07-27 VITALS — SYSTOLIC BLOOD PRESSURE: 156 MMHG | DIASTOLIC BLOOD PRESSURE: 59 MMHG | HEART RATE: 51 BPM

## 2017-07-27 DIAGNOSIS — D50.0 IRON DEFICIENCY ANEMIA SECONDARY TO BLOOD LOSS (CHRONIC): ICD-10-CM

## 2017-07-27 LAB
APTT BLD: 35 SEC — SIGNIFICANT CHANGE UP (ref 27.5–37.4)
BUN SERPL-MCNC: 17 MG/DL — SIGNIFICANT CHANGE UP (ref 7–23)
CALCIUM SERPL-MCNC: 9 MG/DL — SIGNIFICANT CHANGE UP (ref 8.4–10.5)
CHLORIDE SERPL-SCNC: 99 MMOL/L — SIGNIFICANT CHANGE UP (ref 98–107)
CHOLEST SERPL-MCNC: 74 MG/DL — LOW (ref 120–199)
CO2 SERPL-SCNC: 26 MMOL/L — SIGNIFICANT CHANGE UP (ref 22–31)
CREAT SERPL-MCNC: 0.78 MG/DL — SIGNIFICANT CHANGE UP (ref 0.5–1.3)
GLUCOSE SERPL-MCNC: 92 MG/DL — SIGNIFICANT CHANGE UP (ref 70–99)
HCT VFR BLD CALC: 28.7 % — LOW (ref 34.5–45)
HDLC SERPL-MCNC: 38 MG/DL — LOW (ref 45–65)
HGB BLD-MCNC: 8.7 G/DL — LOW (ref 11.5–15.5)
INR BLD: 1.36 — HIGH (ref 0.88–1.17)
LIPID PNL WITH DIRECT LDL SERPL: 29 MG/DL — SIGNIFICANT CHANGE UP
MCHC RBC-ENTMCNC: 19.6 PG — LOW (ref 27–34)
MCHC RBC-ENTMCNC: 30.3 % — LOW (ref 32–36)
MCV RBC AUTO: 64.8 FL — LOW (ref 80–100)
NRBC # FLD: 0 — SIGNIFICANT CHANGE UP
PLATELET # BLD AUTO: 186 K/UL — SIGNIFICANT CHANGE UP (ref 150–400)
PMV BLD: 10 FL — SIGNIFICANT CHANGE UP (ref 7–13)
POTASSIUM SERPL-MCNC: 3.4 MMOL/L — LOW (ref 3.5–5.3)
POTASSIUM SERPL-SCNC: 3.4 MMOL/L — LOW (ref 3.5–5.3)
PROTHROM AB SERPL-ACNC: 15.3 SEC — HIGH (ref 9.8–13.1)
RBC # BLD: 4.43 M/UL — SIGNIFICANT CHANGE UP (ref 3.8–5.2)
RBC # FLD: 23.9 % — HIGH (ref 10.3–14.5)
SODIUM SERPL-SCNC: 139 MMOL/L — SIGNIFICANT CHANGE UP (ref 135–145)
SPECIMEN SOURCE: SIGNIFICANT CHANGE UP
TRIGL SERPL-MCNC: 64 MG/DL — SIGNIFICANT CHANGE UP (ref 10–149)
WBC # BLD: 5.22 K/UL — SIGNIFICANT CHANGE UP (ref 3.8–10.5)
WBC # FLD AUTO: 5.22 K/UL — SIGNIFICANT CHANGE UP (ref 3.8–10.5)

## 2017-07-27 RX ORDER — HYDRALAZINE HCL 50 MG
5 TABLET ORAL ONCE
Qty: 0 | Refills: 0 | Status: COMPLETED | OUTPATIENT
Start: 2017-07-27 | End: 2017-07-27

## 2017-07-27 RX ORDER — FERROUS SULFATE 325(65) MG
1 TABLET ORAL
Qty: 90 | Refills: 0 | OUTPATIENT
Start: 2017-07-27 | End: 2017-08-26

## 2017-07-27 RX ORDER — FUROSEMIDE 40 MG
20 TABLET ORAL ONCE
Qty: 0 | Refills: 0 | Status: COMPLETED | OUTPATIENT
Start: 2017-07-27 | End: 2017-07-27

## 2017-07-27 RX ORDER — SODIUM FERRIC GLUCONAT/SUCROSE 62.5MG/5ML
125 AMPUL (ML) INTRAVENOUS ONCE
Qty: 0 | Refills: 0 | Status: COMPLETED | OUTPATIENT
Start: 2017-07-27 | End: 2017-07-27

## 2017-07-27 RX ORDER — POTASSIUM CHLORIDE 20 MEQ
1 PACKET (EA) ORAL
Qty: 4 | Refills: 0 | OUTPATIENT
Start: 2017-07-27 | End: 2017-07-31

## 2017-07-27 RX ORDER — DOCUSATE SODIUM 100 MG
1 CAPSULE ORAL
Qty: 60 | Refills: 0 | OUTPATIENT
Start: 2017-07-27 | End: 2017-08-26

## 2017-07-27 RX ORDER — SENNA PLUS 8.6 MG/1
2 TABLET ORAL
Qty: 60 | Refills: 0 | OUTPATIENT
Start: 2017-07-27 | End: 2017-08-26

## 2017-07-27 RX ORDER — ASPIRIN/CALCIUM CARB/MAGNESIUM 324 MG
1 TABLET ORAL
Qty: 0 | Refills: 0 | COMMUNITY

## 2017-07-27 RX ORDER — APIXABAN 2.5 MG/1
5 TABLET, FILM COATED ORAL
Qty: 0 | Refills: 0 | COMMUNITY

## 2017-07-27 RX ADMIN — Medication 100 MILLIGRAM(S): at 05:43

## 2017-07-27 RX ADMIN — Medication 125 MICROGRAM(S): at 05:44

## 2017-07-27 RX ADMIN — Medication 110 MILLIGRAM(S): at 14:15

## 2017-07-27 RX ADMIN — ERGOCALCIFEROL 50000 UNIT(S): 1.25 CAPSULE ORAL at 11:22

## 2017-07-27 RX ADMIN — Medication 20 MILLIGRAM(S): at 05:49

## 2017-07-27 RX ADMIN — DIVALPROEX SODIUM 250 MILLIGRAM(S): 500 TABLET, DELAYED RELEASE ORAL at 11:23

## 2017-07-27 RX ADMIN — CEFTRIAXONE 100 GRAM(S): 500 INJECTION, POWDER, FOR SOLUTION INTRAMUSCULAR; INTRAVENOUS at 11:23

## 2017-07-27 RX ADMIN — Medication 5 MILLIGRAM(S): at 02:48

## 2017-07-27 RX ADMIN — Medication 20 MILLIGRAM(S): at 06:03

## 2017-07-27 NOTE — DISCHARGE NOTE ADULT - HOSPITAL COURSE
87 y/o female, with a PmHx of Uterine Ca s/p Hysterectomy, Hypothyroid secondary to partial Thyroidectomy, HTN, CAD w/4 stents, Afib on Eliquis, CVA, SBO s/p partial collectomy, Gerd, Cholecystectomy, presented with SOB and found to have symptomatic anemia. Pt admitted to telemetry.  EKG: Afib @ 77, T inv III  CE x 2 neg        Hgb: 5.8/20.7>>7.9/27.3            BNP: 2,905             UA - pos nitrites     Occult neg  7/26 CXR - lungs suboptimally inflated. Hazy indistinct bilateral CP angles could be due in part to overlying soft tissues and/or small pleural reactions. Grossly clear remaining visualized lungs. No pneumothorax. Stable cardiomegaly and aortic calcifications Trachea midline. Generalized osteopenia and spinal bilateral shoulder degenerative changes again noted.  7/26 Cards: prolonged transfusion to prevent overload (1u over 3-4hours). hold AC in context dropping Hgb, GI eval to identify source of bleed before resuming.   7/27- As per attending, patient stable for discharge. Followup with GI as outpatient.

## 2017-07-27 NOTE — CONSULT NOTE ADULT - ASSESSMENT
88F with acute on chronic anemia, no evidence of acute bleeding at this time, better since transfusion and wants to do w/u as outpt. Discussed the issues in detail with her, her daughter in law and then PA, will recommend:  - replace K  - start oral iron 2-3 times a day.  - IV iron as outpt.  - GI w/u as outpt  - to f/u next week in the office

## 2017-07-27 NOTE — DISCHARGE NOTE ADULT - ADDITIONAL INSTRUCTIONS
Follow up with Cardiologist and PMD within one week of discharge. Call for appointment. Return to ED for any concerning symptoms. Continue medications as prescribed. Low salt, low fat, low cholesterol diet. Followup with private Gastroenterologist. As per cardiology, -Anticoagulation currently held in context of suspected acute blood loss anemia. ANDREAS-VASC score 8 with high risk for CVA. Resume Anticoagulation with Coumadin once active bleed ruled out.

## 2017-07-27 NOTE — PROGRESS NOTE ADULT - ASSESSMENT
88yFemale multiple medical comorbidities significant for afib on eliquis (taking up until arrival here), CAD s/p 9 stents (last in 2007), HTN, HLD, HFpEF (65-70% in 2015), CVA, hypothyroid, small bowel resection (2014) presenting to ED for Hg5.8 in context of SOBx3 weeks and melena. Cardiology consulted for AC management in context of acute blood loss and management of ADCHF. Pending GI eval/identification and management of source of bleed before resuming AC.     -Hgb improved and responsive to 2u pRBC (5.8 to 8.7).   -if patient requires subsequent transfusions, prolonged transfusion to prevent overload (1u over 3-4hours). C/w lasix. PRN doses if necessary for signs of overload during/after transfusion.   -AC currently held in context of suspected acute blood loss anemia. ANDREAS-VASC score 8 with high risk for CVA. GI eval to identify source of bleed. If bleed stable, will address resuming AC.     *Recommendations incomplete until attending attestation. 88yFemale multiple medical comorbidities significant for afib on eliquis (taking up until arrival here), CAD s/p 9 stents (last in 2007), HTN, HLD, HFpEF (65-70% in 2015), CVA, hypothyroid, small bowel resection (2014) presenting to ED for Hg5.8 in context of SOBx3 weeks and melena. Cardiology consulted for AC management in context of acute blood loss and management of ADCHF. Pending GI eval/identification and management of source of bleed before resuming AC.     -Hgb improved and responsive to 2u pRBC (5.8 to 8.7). Currently with no clinical signs of active bleed.   -AC currently held in context of suspected acute blood loss anemia. ANDREAS-VASC score 8 with high risk for CVA. Eval to identify source of bleed. If bleed stable, will address resuming AC.  -if patient requires subsequent transfusions, prolonged transfusion to prevent overload (1u over 3-4hours). C/w lasix. PRN doses if necessary for signs of overload during/after transfusion.     *Recommendations incomplete until attending attestation. 88yFemale multiple medical comorbidities significant for afib on eliquis (taking up until arrival here), CAD s/p 9 stents (last in 2007), HTN, HLD, HFpEF (65-70% in 2015), CVA, hypothyroid, small bowel resection (2014) presenting to ED for Hg5.8 in context of SOBx3 weeks and melena. Cardiology consulted for AC management in context of acute blood loss and management of ADCHF. Pending GI eval/identification and management of source of bleed before resuming AC.     -Hgb improved and responsive to 2u pRBC (5.8 to 8.7). Currently with no clinical signs of active bleed.   -AC currently held in context of suspected acute blood loss anemia. ANDREAS-VASC score 8 with high risk for CVA. Resume AC with Coumadin once active bleed ruled out.  -if patient requires subsequent transfusions, prolonged transfusion to prevent overload (1u over 3-4hours). C/w lasix. PRN doses if necessary for signs of overload during/after transfusion.     *Recommendations incomplete until attending attestation. 88yFemale multiple medical comorbidities significant for afib on eliquis (taking up until arrival here), CAD s/p 9 stents (last in 2007), HTN, HLD, HFpEF (65-70% in 2015), CVA, hypothyroid, small bowel resection (2014) presenting to ED for Hg5.8 in context of SOBx3 weeks and melena. Cardiology consulted for AC management in context of acute blood loss and management of ADCHF.     -Hgb improved and responsive to 2u pRBC (5.8 to 8.7). Currently with no clinical signs of active bleed.   -AC currently held in context of suspected acute blood loss anemia. ANDREAS-VASC score 8 with high risk for CVA. Resume AC with Coumadin once active bleed ruled out.  -if patient requires subsequent transfusions, prolonged transfusion to prevent overload (1u over 3-4hours). C/w lasix. PRN doses if necessary for signs of overload during/after transfusion.     *Recommendations incomplete until attending attestation.

## 2017-07-27 NOTE — CONSULT NOTE ADULT - PROBLEM SELECTOR RECOMMENDATION 9
no overt gi bleed  patient will need outpt gi workup  I spoke to patients son, she has good outpatient follow up  no gi objection to dc planning

## 2017-07-27 NOTE — DISCHARGE NOTE ADULT - CARE PLAN
Principal Discharge DX:	Acute blood loss anemia  Goal:	Followup with PMD and take all medications prescribed.  Instructions for follow-up, activity and diet:	Followup with PMD and take all medications prescribed. Low salt, low fat, low cholesterol diet. As per cardiology, -Anticoagulation currently held in context of suspected acute blood loss anemia. ANDREAS-VASC score 8 with high risk for CVA. Resume Anticoagulation with Coumadin once active bleed ruled out.  Secondary Diagnosis:	Chronic blood loss anemia  Goal:	Followup with PMD and take all medications prescribed.  Instructions for follow-up, activity and diet:	Followup with PMD and take all medications prescribed. Low salt, low fat, low cholesterol diet   Followup with private Gastroenterologist. As per cardiology, -Anticoagulation currently held in context of suspected acute blood loss anemia. ANDREAS-VASC score 8 with high risk for CVA. Resume Anticoagulation with Coumadin once active bleed ruled out.  Secondary Diagnosis:	Hypothyroid  Goal:	Followup with PMD and take all medications prescribed.  Instructions for follow-up, activity and diet:	Followup with PMD and take all medications prescribed. Low salt, low fat, low cholesterol diet  Secondary Diagnosis:	Essential hypertension  Goal:	Followup with PMD and take all medications prescribed.  Instructions for follow-up, activity and diet:	Followup with PMD and take all medications prescribed. Low salt, low fat, low cholesterol diet

## 2017-07-27 NOTE — DISCHARGE NOTE ADULT - MEDICATION SUMMARY - MEDICATIONS TO STOP TAKING
I will STOP taking the medications listed below when I get home from the hospital:    Eliquis 5 mg oral tablet  -- 5 milligram(s) by mouth once a day    aspirin 81 mg oral tablet  -- 1 tab(s) by mouth once a day

## 2017-07-27 NOTE — CONSULT NOTE ADULT - SUBJECTIVE AND OBJECTIVE BOX
Chief Complaint:  Patient is a 88y old  Female who presents with a chief complaint of Shortness of breath (2017 15:39)      HPI: 88F who presents with symptomatic anemia  she denies any recent melena or hematochezia  son states about a month ago she had some small red blood in stool  she had a egd/colon 3 years ago by danielito duarte  she was transfused and feels better      Allergies:  No Known Allergies      Medications:  acetaminophen   Tablet. 650 milliGRAM(s) Oral every 6 hours PRN  cefTRIAXone   IVPB 1 Gram(s) IV Intermittent every 24 hours  diltiazem    milliGRAM(s) Oral daily  diVALproex  milliGRAM(s) Oral daily  atorvastatin 20 milliGRAM(s) Oral at bedtime  labetalol 100 milliGRAM(s) Oral two times a day  furosemide    Tablet 20 milliGRAM(s) Oral daily  levothyroxine 125 MICROGram(s) Oral daily  ergocalciferol 88099 Unit(s) Oral every week      PMHX/PSHX:  SBO (small bowel obstruction)  CVA (cerebral vascular accident)  Uterus cancer  Osteoarthritis  GERD (gastroesophageal reflux disease)  UTI (urinary tract infection)  Stented coronary artery  Hypothyroid  Hyperlipidemia  HTN (hypertension)  S/P small bowel resection  H/O total hysterectomy  S/P colon resection  FHx: total knee replacement  S/P total knee replacement  History of partial thyroidectomy  S/P cholecystectomy  After cataract, bilateral      Family history:  Family history of heart valve abnormality (Child)  Family history of diabetes mellitus  Family history of diabetes mellitus      Social History:     ROS:     General:  No wt loss, fevers, chills, night sweats, fatigue,   Eyes:  Good vision, no reported pain  ENT:  No sore throat, pain, runny nose, dysphagia  CV:  No pain, palpitations, hypo/hypertension  Resp:  No dyspnea, cough, tachypnea, wheezing  GI:  No pain, No nausea, No vomiting, No diarrhea, No constipation, No weight loss, No fever, No pruritis, No rectal bleeding, No tarry stools, No dysphagia,  :  No pain, bleeding, incontinence, nocturia  Muscle:  No pain, weakness  Neuro:  No weakness, tingling, memory problems  Psych:  No fatigue, insomnia, mood problems, depression  Endocrine:  No polyuria, polydipsia, cold/heat intolerance  Heme:  No petechiae, ecchymosis, easy bruisability  Skin:  No rash, tattoos, scars, edema      PHYSICAL EXAM:   Vital Signs:  Vital Signs Last 24 Hrs  T(C): 36.6 (2017 13:31), Max: 36.9 (2017 20:35)  T(F): 97.9 (2017 13:31), Max: 98.5 (2017 20:35)  HR: 51 (2017 16:51) (51 - 73)  BP: 156/59 (2017 16:51) (141/67 - 195/88)  BP(mean): --  RR: 18 (2017 13:31) (18 - 19)  SpO2: 99% (2017 13:31) (99% - 100%)  Daily     Daily Weight in k.3 (2017 07:45)    GENERAL:  Appears stated age, well-groomed, well-nourished, no distress  HEENT:  NC/AT,  conjunctivae clear and pink, no thyromegaly, nodules, adenopathy, no JVD, sclera -anicteric  CHEST:  Full & symmetric excursion, no increased effort, breath sounds clear  HEART:  Regular rhythm, S1, S2, no murmur/rub/S3/S4, no abdominal bruit, no edema  ABDOMEN:  Soft, non-tender, non-distended, normoactive bowel sounds,  no masses ,no hepato-splenomegaly, no signs of chronic liver disease  EXTEREMITIES:  no cyanosis,clubbing or edema  SKIN:  No rash/erythema/ecchymoses/petechiae/wounds/abscess/warm/dry  NEURO:  Alert, oriented, no asterixis, no tremor, no encephalopathy    LABS:                        8.7    5.22  )-----------( 186      ( 2017 05:30 )             28.7     07-27    139  |  99  |  17  ----------------------------<  92  3.4<L>   |  26  |  0.78    Ca    9.0      2017 05:30  Mg     1.7     -    TPro  7.6  /  Alb  3.6  /  TBili  0.5  /  DBili  x   /  AST  22  /  ALT  11  /  AlkPhos  103  07-26    LIVER FUNCTIONS - ( 2017 13:22 )  Alb: 3.6 g/dL / Pro: 7.6 g/dL / ALK PHOS: 103 u/L / ALT: 11 u/L / AST: 22 u/L / GGT: x           PT/INR - ( 2017 05:30 )   PT: 15.3 SEC;   INR: 1.36          PTT - ( 2017 05:30 )  PTT:35.0 SEC  Urinalysis Basic - ( 2017 14:18 )    Color: PLYEL / Appearance: CLEAR / S.013 / pH: 7.0  Gluc: NEGATIVE / Ketone: NEGATIVE  / Bili: NEGATIVE / Urobili: NORMAL E.U.   Blood: NEGATIVE / Protein: 10 / Nitrite: POSITIVE   Leuk Esterase: NEGATIVE / RBC: 0-2 / WBC 2-5   Sq Epi: FEW / Non Sq Epi: x / Bacteria: FEW          Imaging:

## 2017-07-27 NOTE — CHART NOTE - NSCHARTNOTEFT_GEN_A_CORE
89 y/o female admitted with symptomatic anemia Hb 5 s/p 2 units PRBCs, currently with slight wheezing noted by RN. On exam, pt with pulse ox 100% on RA, but has very minimal wheeze on exam, no JVD. Pt without any complaints. Pt just received AM dose of Lasix 20mg PO, will give extra dose of Lasix 20mg IVP now and monitor.

## 2017-07-27 NOTE — CONSULT NOTE ADULT - SUBJECTIVE AND OBJECTIVE BOX
Patient is a 88y old  Female who presents with a chief complaint of Shortness of breath (26 Jul 2017 15:39), has h/o iron def anemia and did not f/u for a long time, was sent here yesterday because of significant drop in hgb and symptoms. Ptb received 2 units of PRBCs and IV iron dose, doing much better. Guaiac negative but iron deficient and needs GI evaluation. Pt really wants to go home, very uncomfortable here. Denies any active symptoms, feels better and stronger.    PAST MEDICAL & SURGICAL HISTORY:  SBO (small bowel obstruction)  CVA (cerebral vascular accident): no residual weakness.  Uterus cancer: 1996 with some radiation post - op  Osteoarthritis  GERD (gastroesophageal reflux disease)  UTI (urinary tract infection): recent had cipro for 1 week with no follow - up urine culture - [facility]  Stented coronary artery: total 9 - last 2007 [facility]  - on coumadin to discontinue  Hypothyroid: last TFT&#x27;s 1/5/15 - normal  Hyperlipidemia  HTN (hypertension)  S/P small bowel resection: Diagnostic laparoscopy/Ex-lap/Small bowel resection/enterotomy repair 9/18/2014 at [facility] Dr. Rea  H/O total hysterectomy: 1996  FHx: total knee replacement: left 2005  S/P total knee replacement: right - 1995  History of partial thyroidectomy: 1973  S/P cholecystectomy: 2008  After cataract, bilateral: 2010 - bilateral    Meds:  acetaminophen   Tablet. 650 milliGRAM(s) Oral every 6 hours PRN  cefTRIAXone   IVPB 1 Gram(s) IV Intermittent every 24 hours  diltiazem    milliGRAM(s) Oral daily  diVALproex  milliGRAM(s) Oral daily  atorvastatin 20 milliGRAM(s) Oral at bedtime  labetalol 100 milliGRAM(s) Oral two times a day  furosemide    Tablet 20 milliGRAM(s) Oral daily  levothyroxine 125 MICROGram(s) Oral daily  ergocalciferol 30431 Unit(s) Oral every week      No Known Allergies      FAMILY HISTORY:  Family history of heart valve abnormality (Child)  Family history of diabetes mellitus    SHx: never smoked and never used alcohol.    Vital Signs Last 24 Hrs  T(C): 36.6 (27 Jul 2017 13:31), Max: 36.9 (26 Jul 2017 20:35)  T(F): 97.9 (27 Jul 2017 13:31), Max: 98.5 (26 Jul 2017 20:35)  HR: 62 (27 Jul 2017 13:31) (56 - 73)  BP: 146/60 (27 Jul 2017 13:31) (141/67 - 195/88)  BP(mean): --  RR: 18 (27 Jul 2017 13:31) (18 - 19)  SpO2: 99% (27 Jul 2017 13:31) (99% - 100%)                          8.7    5.22  )-----------( 186      ( 27 Jul 2017 05:30 )             28.7       07-27    139  |  99  |  17  ----------------------------<  92  3.4<L>   |  26  |  0.78    Ca    9.0      27 Jul 2017 05:30  Mg     1.7     07-26    TPro  7.6  /  Alb  3.6  /  TBili  0.5  /  DBili  x   /  AST  22  /  ALT  11  /  AlkPhos  103  07-26      PT/INR - ( 27 Jul 2017 05:30 )   PT: 15.3 SEC;   INR: 1.36          PTT - ( 27 Jul 2017 05:30 )  PTT:35.0 SEC    ferritin low, TIBC high - diagnostic of iron deficiency.        Ass/rec:

## 2017-07-27 NOTE — DISCHARGE NOTE ADULT - CARE PROVIDER_API CALL
Fortunato Cabrera (KI[place]), Hematology; Medical Oncology  [address] [address]  Phone: [phone]  Fax: [phone]    Janes Arriaga (KILEY), Internal Medicine  [address] [place] [address]  Phone: [phone]  Fax: [phone]

## 2017-07-27 NOTE — PROGRESS NOTE ADULT - ASSESSMENT
87 y/o female, with a PmHx of A-fib on Eliquis, [place], HTN, HLD, CAD s/p stent placement 7-8 years ago, Hypothyroidism secondary to Partial Thyroidectomy, and Uterine cancer s/p Hysterectomy, Cholecystectomy, is being admitted to telemetry for symptomatic anemia.  87 y/o female with PMHx of a-fib, HTN, HLD, CAD s/p stent placement 7-8 years ago, hypothyroidism, and uterine cancer presenting to the ED with shortness of breath admitted for r/o new onset heart failure versus symptomatic anemia    Problem/Plan - 1:  ·  Problem: Shortness of breath.  Plan: found to have symptomatic anemia, type and screen  BNP elevated and H&H decreased  Will order JONH to r/o heart failure  2 units of PRBC's to be transfused, Heme c/s Dr. Cabrera, anemia w/u orderedSOB possibly due to new onset heart failure versus symptomatic anemia  BNP elevated and H&H decreased  Will order JONH.     Problem/Plan - 2:  ·  Problem: A-fib.  Plan: A-fib with normal rate on ECG  Currently anticoagulated on Eliquis, hold Eliquis for now due to bleeding  rate control, monitor H & H  Serial ECGsA-fib with normal rate on ECG  Currently anticoagulated on Eliquis  Serial ECGs.     Problem/Plan - 3:  ·  Problem: CAD (coronary artery disease).  Plan: Hx of stent placement  Serial ECGs and cardiac enzymes.     Problem/Plan - 4:  ·  Problem: HTN (hypertension).  Plan: Monitor BP  Continue meds, adjust as needed  DASH diet recommendedMonitor BP  Continue Labetalol  DASH diet recommended.     Problem/Plan - 5:  ·  Problem: Hyperlipidemia.  Plan: Will check FLP  Continue Atorvastatin  Low cholesterol diet recommended.     Problem/Plan - 6:  Problem: Hypothyroid. Plan: Will check TSH and free T3 / T4  Continue Levothyroxine. 89 y/o female, with a PmHx of A-fib on Eliquis, [place], HTN, HLD, CAD s/p stent placement 7-8 years ago, Hypothyroidism secondary to Partial Thyroidectomy, and Uterine cancer s/p Hysterectomy, Cholecystectomy, is being admitted to telemetry for symptomatic anemia.  89 y/o female with PMHx of a-fib, HTN, HLD, CAD s/p stent placement 7-8 years ago, hypothyroidism, and uterine cancer presenting to the ED with shortness of breath admitted for r/o new onset heart failure versus symptomatic anemia    Problem/Plan - 1:  ·  Problem: Shortness of breath.  Plan: found to have symptomatic anemia, type and screen  BNP elevated and H&H decreased  Will order JONH to r/o heart failure   sp 2 units of PRBC's BNP elevated and H&H decreased  Will order TEEgi helms     Problem/Plan - 2:  ·  Problem: A-fib.  Plan: A-fib with normal rate on ECG  Currently anticoagulated on Eliquis, hold Eliquis for now due to bleeding  rate control, monitor H & H  ards fu  Currently anticoagulated on Eliquis  Serial ECGs     Problem/Plan - 3:  ·  Problem: CAD (coronary artery disease).  Plan: Hx of stent placement  cards fu    Problem/Plan - 4:  ·  Problem: HTN (hypertension).  Plan: Monitor BP  Continue meds, adjust as needed  DASH diet recommendedMonitor BP  Continue Labetalol  DASH diet recommended.     Problem/Plan - 5:  ·  Problem: Hyperlipidemia.  Plan: Will check FLP  Continue Atorvastatin  Low cholesterol diet recommended.     Problem/Plan - 6:  Problem: Hypothyroid. Plan: Will check TSH and free T3 / T4  Continue Levothyroxine.

## 2017-07-27 NOTE — PROGRESS NOTE ADULT - SUBJECTIVE AND OBJECTIVE BOX
Patient is a 88y old  Female who presents with a chief complaint of Shortness of breath (2017 15:39)      INTERVAL HPI/OVERNIGHT EVENTS:  T(C): 36.6 (17 @ 13:31), Max: 36.9 (17 @ 20:35)  HR: 62 (17 @ 13:31) (56 - 73)  BP: 146/60 (17 @ 13:31) (141/67 - 196/70)  RR: 18 (17 @ 13:31) (18 - 19)  SpO2: 99% (17 @ 13:31) (99% - 100%)  Wt(kg): --  I&O's Summary    2017 07:01  -  2017 14:48  --------------------------------------------------------  IN: 320 mL / OUT: 0 mL / NET: 320 mL        LABS:                        8.7    5.22  )-----------( 186      ( 2017 05:30 )             28.7     07    139  |  99  |  17  ----------------------------<  92  3.4<L>   |  26  |  0.78    Ca    9.0      2017 05:30  Mg     1.7         TPro  7.6  /  Alb  3.6  /  TBili  0.5  /  DBili  x   /  AST  22  /  ALT  11  /  AlkPhos  103  -26    PT/INR - ( 2017 05:30 )   PT: 15.3 SEC;   INR: 1.36          PTT - ( 2017 05:30 )  PTT:35.0 SEC  Urinalysis Basic - ( 2017 14:18 )    Color: PLYEL / Appearance: CLEAR / S.013 / pH: 7.0  Gluc: NEGATIVE / Ketone: NEGATIVE  / Bili: NEGATIVE / Urobili: NORMAL E.U.   Blood: NEGATIVE / Protein: 10 / Nitrite: POSITIVE   Leuk Esterase: NEGATIVE / RBC: 0-2 / WBC 2-5   Sq Epi: FEW / Non Sq Epi: x / Bacteria: FEW      CAPILLARY BLOOD GLUCOSE            Urinalysis Basic - ( 2017 14:18 )    Color: PLYEL / Appearance: CLEAR / S.013 / pH: 7.0  Gluc: NEGATIVE / Ketone: NEGATIVE  / Bili: NEGATIVE / Urobili: NORMAL E.U.   Blood: NEGATIVE / Protein: 10 / Nitrite: POSITIVE   Leuk Esterase: NEGATIVE / RBC: 0-2 / WBC 2-5   Sq Epi: FEW / Non Sq Epi: x / Bacteria: FEW        MEDICATIONS  (STANDING):  cefTRIAXone   IVPB 1 Gram(s) IV Intermittent every 24 hours  diltiazem    milliGRAM(s) Oral daily  diVALproex  milliGRAM(s) Oral daily  atorvastatin 20 milliGRAM(s) Oral at bedtime  labetalol 100 milliGRAM(s) Oral two times a day  furosemide    Tablet 20 milliGRAM(s) Oral daily  levothyroxine 125 MICROGram(s) Oral daily  ergocalciferol 15903 Unit(s) Oral every week    MEDICATIONS  (PRN):  acetaminophen   Tablet. 650 milliGRAM(s) Oral every 6 hours PRN mild, moderate pain          PHYSICAL EXAM:  GENERAL: NAD, well-groomed, well-developed  HEAD:  Atraumatic, Normocephalic  CHEST/LUNG: Clear to percussion bilaterally; No rales, rhonchi, wheezing, or rubs  HEART: Regular rate and rhythm; No murmurs, rubs, or gallops  ABDOMEN: Soft, Nontender, Nondistended; Bowel sounds present  EXTREMITIES:  2+ Peripheral Pulses, No clubbing, cyanosis, or edema  LYMPH: No lymphadenopathy noted  SKIN: No rashes or lesions    Care Discussed with Consultants/Other Providers [+ ] YES  [ ] NO

## 2017-07-27 NOTE — PROGRESS NOTE ADULT - SUBJECTIVE AND OBJECTIVE BOX
[facility] Progress Note  Consult Spectra [id number]    Interval Events: s/p 2 units pRBC. Some wheezing o/n and given 20 lasix IVP in addition to standing doses.     Review of Systems:  Constitutional: [ ] Fever [ ] Chills [ ] Fatigue [ ] Weight change   HEENT: [ ] Blurred vision [ ] Eye Pain [ ] Headache [ ] Runny nose [ ] Sore Throat   Respiratory: [ ] Cough [ ] Wheezing [ ] Shortness of breath  Cardiovascular: [ ] Chest Pain [ ] Palpitations [ ] MENON [ ] PND [ ] Orthopnea  Gastrointestinal: [ ] Abdominal Pain [ ] Diarrhea [ ] Constipation [ ] Hemorrhoids [ ] Nausea [ ] Vomiting  Genitourinary: [ ] Nocturia [ ] Dysuria [ ] Incontinence  Extremities: [ ] Swelling [ ] Joint Pain  Neurologic: [ ] Focal deficit [ ] Paresthesias [ ] Syncope  Lymphatic: [ ] Swelling [ ] Lymphadenopathy   Skin: [ ] Rash [ ] Ecchymoses [ ] Wounds [ ] Lesions  Psychiatry: [ ] Depression [ ] Suicidal/Homicidal Ideation [ ] Anxiety [ ] Sleep Disturbances  [ ] 10 point review of systems is otherwise negative except as mentioned above            [ ]Unable to obtain      MEDICATIONS:  diltiazem    milliGRAM(s) Oral daily  labetalol 100 milliGRAM(s) Oral two times a day  furosemide    Tablet 20 milliGRAM(s) Oral daily    cefTRIAXone   IVPB 1 Gram(s) IV Intermittent every 24 hours      acetaminophen   Tablet. 650 milliGRAM(s) Oral every 6 hours PRN  diVALproex  milliGRAM(s) Oral daily      atorvastatin 20 milliGRAM(s) Oral at bedtime  levothyroxine 125 MICROGram(s) Oral daily    ergocalciferol 39020 Unit(s) Oral every week      PHYSICAL EXAM:  T(C): 36.6 (17 @ 05:49), Max: 36.9 (17 @ 20:35)  HR: 56 (17 @ 08:30) (56 - 82)  BP: 150/66 (17 @ 08:30) (141/67 - 198/65)  RR: 18 (17 @ 05:49) (18 - 20)  SpO2: 100% (17 @ 05:49) (98% - 100%)  Wt(kg): --  I&O's Summary    2017 07:01  -  2017 10:02  --------------------------------------------------------  IN: 320 mL / OUT: 0 mL / NET: 320 mL      Daily Height in cm: 142.24 (2017 15:39)    Daily Weight in k.3 (2017 07:45)    Appearance: Normal	  HEENT:   Normal oral mucosa, PERRL, EOMI	  Lymphatic: No lymphadenopathy  Cardiovascular: Normal S1 S2, No JVD, No murmurs, No edema  Respiratory: Lungs clear to auscultation	  Psychiatry: A & O x 3, Mood & affect appropriate  Gastrointestinal:  soft nt nd, normoactive bs  Skin: No rashes, No ecchymoses, No cyanosis	  Neurologic: Non-focal  Extremities: Normal range of motion, No clubbing, cyanosis  Vascular: Peripheral pulses palpable 2+ bilaterally    LABS:	 	    CBC Full  -  ( 2017 05:30 )  WBC Count : 5.22 K/uL  Hemoglobin : 8.7 g/dL  Hematocrit : 28.7 %  Platelet Count - Automated : 186 K/uL  Mean Cell Volume : 64.8 fL  Mean Cell Hemoglobin : 19.6 pg  Mean Cell Hemoglobin Concentration : 30.3 %  Auto Neutrophil # : x  Auto Lymphocyte # : x  Auto Monocyte # : x  Auto Eosinophil # : x  Auto Basophil # : x  Auto Neutrophil % : x  Auto Lymphocyte % : x  Auto Monocyte % : x  Auto Eosinophil % : x  Auto Basophil % : x        139  |  99  |  17  ----------------------------<  92  3.4<L>   |  26  |  0.78      140  |  105  |  21  ----------------------------<  100<H>  4.3   |  25  |  0.71    Ca    9.0      2017 05:30  Ca    8.8      2017 13:22  Mg     1.7         TPro  7.6  /  Alb  3.6  /  TBili  0.5  /  DBili  x   /  AST  22  /  ALT  11  /  AlkPhos  103        proBNP: Serum Pro-Brain Natriuretic Peptide: 2905 pg/mL ( @ 13:22)    Lipid Profile:   HgA1c: Hemoglobin A1C, Whole Blood: 5.8 % ( @ 21:32)    TSH: Thyroid Stimulating Hormone, Serum: 0.62 uIU/mL ( @ :26)      CARDIAC MARKERS:  Troponin T, Serum: < 0.06 ng/mL ( @ 21:32)  Troponin T, Serum: < 0.06 ng/mL ( @ 13:22)      CKMB: 2.68 ng/mL ( @ 21:32)  CKMB: 2.05 ng/mL ( @ 13:22)        TELEMETRY: 	    ECG:  	  RADIOLOGY:  OTHER: 	    PREVIOUS DIAGNOSTIC TESTING:    [ ] Echocardiogram:  [ ] Catheterization:  [ ] Stress Test: [facility] Progress Note  Consult Spectra [id number]    Interval Events: s/p 2 units pRBC. Some wheezing o/n per tele note and given 20 lasix IVP in addition to standing doses, although patient states she was not SOB.     Review of Systems: Denied CP, palpitations, SOB, cough, fever, chills, nausea, vomiting, abdominal pain.   Constitutional: [ ] Fever [ ] Chills [ ] Fatigue [ ] Weight change   HEENT: [ ] Blurred vision [ ] Eye Pain [ ] Headache [ ] Runny nose [ ] Sore Throat   Respiratory: [ ] Cough [ ] Wheezing [ ] Shortness of breath  Cardiovascular: [ ] Chest Pain [ ] Palpitations [ ] MENON [ ] PND [ ] Orthopnea  Gastrointestinal: [ ] Abdominal Pain [ ] Diarrhea [ ] Constipation [ ] Hemorrhoids [ ] Nausea [ ] Vomiting  Genitourinary: [ ] Nocturia [ ] Dysuria [ ] Incontinence  Extremities: [ ] Swelling [ ] Joint Pain  Neurologic: [ ] Focal deficit [ ] Paresthesias [ ] Syncope  Lymphatic: [ ] Swelling [ ] Lymphadenopathy   Skin: [ ] Rash [ ] Ecchymoses [ ] Wounds [ ] Lesions  Psychiatry: [ ] Depression [ ] Suicidal/Homicidal Ideation [ ] Anxiety [ ] Sleep Disturbances  [x] 10 point review of systems is otherwise negative except as mentioned above            [ ]Unable to obtain      MEDICATIONS:  diltiazem    milliGRAM(s) Oral daily  labetalol 100 milliGRAM(s) Oral two times a day  furosemide    Tablet 20 milliGRAM(s) Oral daily    cefTRIAXone   IVPB 1 Gram(s) IV Intermittent every 24 hours      acetaminophen   Tablet. 650 milliGRAM(s) Oral every 6 hours PRN  diVALproex  milliGRAM(s) Oral daily      atorvastatin 20 milliGRAM(s) Oral at bedtime  levothyroxine 125 MICROGram(s) Oral daily    ergocalciferol 35706 Unit(s) Oral every week      PHYSICAL EXAM:  T(C): 36.6 (17 @ 05:49), Max: 36.9 (17 @ 20:35)  HR: 56 (17 @ 08:30) (56 - 82)  BP: 150/66 (17 @ 08:30) (141/67 - 198/65)  RR: 18 (17 @ 05:49) (18 - 20)  SpO2: 100% (17 @ 05:49) (98% - 100%)  Wt(kg): --  I&O's Summary    2017 07:01  -  2017 10:02  --------------------------------------------------------  IN: 320 mL / OUT: 0 mL / NET: 320 mL      Daily Height in cm: 142.24 (2017 15:39)    Daily Weight in k.3 (2017 07:45)    Appearance: Normal	  HEENT:   Normal oral mucosa, PERRL, EOMI	  Lymphatic: No lymphadenopathy  Cardiovascular: Irregularly irregular rhythm, No JVD, No murmurs,1+ pitting edema.   Respiratory: Mild bibasilar crackles. No wheezing.   Psychiatry: A & O x 3, Mood & affect appropriate  Gastrointestinal:  soft nt nd, normoactive bs  Skin: No rashes, No ecchymoses, No cyanosis	  Neurologic: Non-focal  Extremities: Normal range of motion, No clubbing, cyanosis  Vascular: Peripheral pulses palpable 2+ bilaterally    LABS:	 	    CBC Full  -  ( 2017 05:30 )  WBC Count : 5.22 K/uL  Hemoglobin : 8.7 g/dL  Hematocrit : 28.7 %  Platelet Count - Automated : 186 K/uL  Mean Cell Volume : 64.8 fL  Mean Cell Hemoglobin : 19.6 pg  Mean Cell Hemoglobin Concentration : 30.3 %  Auto Neutrophil # : x  Auto Lymphocyte # : x  Auto Monocyte # : x  Auto Eosinophil # : x  Auto Basophil # : x  Auto Neutrophil % : x  Auto Lymphocyte % : x  Auto Monocyte % : x  Auto Eosinophil % : x  Auto Basophil % : x        139  |  99  |  17  ----------------------------<  92  3.4<L>   |  26  |  0.78      140  |  105  |  21  ----------------------------<  100<H>  4.3   |  25  |  0.71    Ca    9.0      2017 05:30  Ca    8.8      2017 13:22  Mg     1.7         TPro  7.6  /  Alb  3.6  /  TBili  0.5  /  DBili  x   /  AST  22  /  ALT  11  /  AlkPhos  103        proBNP: Serum Pro-Brain Natriuretic Peptide: 2905 pg/mL ( @ 13:22)    Lipid Profile:   HgA1c: Hemoglobin A1C, Whole Blood: 5.8 % ( @ 21:32)    TSH: Thyroid Stimulating Hormone, Serum: 0.62 uIU/mL ( @ :26)      CARDIAC MARKERS:  Troponin T, Serum: < 0.06 ng/mL ( @ 21:32)  Troponin T, Serum: < 0.06 ng/mL ( @ 13:22)    CKMB: 2.68 ng/mL ( @ 21:32)  CKMB: 2.05 ng/mL ( @ 13:22)    TELEMETRY: slow a fib with ventricular rates in low 50s.  ECG:   	  RADIOLOGY:  OTHER: 	    PREVIOUS DIAGNOSTIC TESTING:    [ ] Echocardiogram:  [ ] Catheterization:  [ ] Stress Test:

## 2017-07-27 NOTE — DISCHARGE NOTE ADULT - MEDICATION SUMMARY - MEDICATIONS TO TAKE
I will START or STAY ON the medications listed below when I get home from the hospital:    DilTIAZem Hydrochloride  mg/24 hours oral capsule, extended release  -- 1 cap(s) by mouth once a day  -- Indication: For A-fib    Depakote  mg oral tablet, extended release  -- 250 milligram(s) by mouth once a day  -- Indication: For Anticonvulsant    atorvastatin 20 mg oral tablet  -- 1 tab(s) by mouth once a day  -- Indication: For HLD    labetalol 100 mg oral tablet  -- 1 tab(s) by mouth 2 times a day  -- Indication: For HTN (hypertension)    furosemide 20 mg oral tablet  -- 1 tab(s) by mouth once a day  -- Indication: For HTN (hypertension)    ferrous sulfate 325 mg (65 mg elemental iron) oral tablet  -- 1 tab(s) by mouth 3 times a day  -- Check with your doctor before becoming pregnant.  Do not chew, break, or crush.  May discolor urine or feces.    -- Indication: For Anemia    Colace 100 mg oral capsule  -- 1 cap(s) by mouth 2 times a day, As Needed for constipation  -- Medication should be taken with plenty of water.    -- Indication: For Stool softner    senna oral tablet  -- 2 tab(s) by mouth once a day (at bedtime), As Needed for constipation  -- Indication: For Laxative    potassium chloride 10 mEq oral capsule, extended release  -- 1 cap(s) by mouth once a day  -- It is very important that you take or use this exactly as directed.  Do not skip doses or discontinue unless directed by your doctor.  Medication should be taken with plenty of water.  Swallow whole.  Do not crush.  Take with food or milk.    -- Indication: For Potassium replacement    levothyroxine 125 mcg (0.125 mg) oral tablet  -- 1 tab(s) by mouth once a day in am  -- Indication: For Hypothyroid    Vitamin D2 50,000 intl units (1.25 mg) oral capsule  -- 1 cap(s) by mouth once a week  -- Indication: For vitamin

## 2017-07-27 NOTE — DISCHARGE NOTE ADULT - PLAN OF CARE
Followup with PMD and take all medications prescribed. Followup with PMD and take all medications prescribed. Low salt, low fat, low cholesterol diet. As per cardiology, -Anticoagulation currently held in context of suspected acute blood loss anemia. ANDREAS-VASC score 8 with high risk for CVA. Resume Anticoagulation with Coumadin once active bleed ruled out. Followup with PMD and take all medications prescribed. Low salt, low fat, low cholesterol diet   Followup with private Gastroenterologist. As per cardiology, -Anticoagulation currently held in context of suspected acute blood loss anemia. ANDREAS-VASC score 8 with high risk for CVA. Resume Anticoagulation with Coumadin once active bleed ruled out. Followup with PMD and take all medications prescribed. Low salt, low fat, low cholesterol diet

## 2017-07-27 NOTE — DISCHARGE NOTE ADULT - PATIENT PORTAL LINK FT
“You can access the [employer] Patient Portal, offered by [facility], by registering with the following website: [email or web address]”

## 2017-07-28 LAB
-  AMIKACIN: SIGNIFICANT CHANGE UP
-  AMPICILLIN/SULBACTAM: SIGNIFICANT CHANGE UP
-  AMPICILLIN: SIGNIFICANT CHANGE UP
-  AZTREONAM: SIGNIFICANT CHANGE UP
-  CEFAZOLIN: SIGNIFICANT CHANGE UP
-  CEFEPIME: SIGNIFICANT CHANGE UP
-  CEFOXITIN: SIGNIFICANT CHANGE UP
-  CEFTAZIDIME: SIGNIFICANT CHANGE UP
-  CEFTRIAXONE: SIGNIFICANT CHANGE UP
-  CIPROFLOXACIN: SIGNIFICANT CHANGE UP
-  ERTAPENEM: SIGNIFICANT CHANGE UP
-  GENTAMICIN: SIGNIFICANT CHANGE UP
-  IMIPENEM: SIGNIFICANT CHANGE UP
-  LEVOFLOXACIN: SIGNIFICANT CHANGE UP
-  MEROPENEM: SIGNIFICANT CHANGE UP
-  NITROFURANTOIN: SIGNIFICANT CHANGE UP
-  PIPERACILLIN/TAZOBACTAM: SIGNIFICANT CHANGE UP
-  TIGECYCLINE: SIGNIFICANT CHANGE UP
-  TOBRAMYCIN: SIGNIFICANT CHANGE UP
-  TRIMETHOPRIM/SULFAMETHOXAZOLE: SIGNIFICANT CHANGE UP
BACTERIA UR CULT: SIGNIFICANT CHANGE UP
METHOD TYPE: SIGNIFICANT CHANGE UP
ORGANISM # SPEC MICROSCOPIC CNT: SIGNIFICANT CHANGE UP
ORGANISM # SPEC MICROSCOPIC CNT: SIGNIFICANT CHANGE UP

## 2017-07-30 LAB — VIT B12 USB SERPL-MCNC: < 88 PG/ML — LOW (ref 650–1340)

## 2018-03-20 ENCOUNTER — APPOINTMENT (OUTPATIENT)
Dept: ORTHOPEDIC SURGERY | Facility: CLINIC | Age: 83
End: 2018-03-20

## 2018-08-01 NOTE — H&P ADULT - EYES
Date of Service: 07/31/2018    [address] [address]     HISTORY OF PRESENT ILLNESS:  The patient is seen today at [facility] rehab.  The patient is seen today for initial followup visit after hospital discharge.  She was discharged from [facility] on 07/25/2018 after a distal femur replacement.  The patient has had a history of a chronic infected knee joint and earlier had an antibiotic spacer placed, which was now removed.  Cultures done at the time of surgery were negative.  She will continue with long-term Doxycycline antibiotic suppression for her history of infection and will complete 4 weeks of oral Vancomycin because of a previous history of Clostridium difficile colitis.  The patient reports that at this time she is having very little pain.  Tylenol has been managing her pain symptoms.  She has not needed to use narcotic pain medications. She has been undergoing therapy and tolerating this and will have upcoming visit with her orthopedic surgeon within the next couple of weeks.  The patient reports that her appetite has been fair and she has been sleeping okay.  Denies chest pain, cough, palpitations or shortness of breath.  She reports that she is moving her bowels and urinating without difficulty.  No nausea or emesis.    PAST MEDICAL HISTORY:  Other past medical history is positive for atherosclerotic heart disease, hypertension, hypothyroidism, anemia and hypokalemia.  These have all been stable without any current problems or concerns.    CURRENT MEDICATIONS AND ALLERGIES:  Noted and reviewed as per her medical record, patient and staff have no other concerns or complaints reported at this time.    PHYSICAL EXAMINATION:  VITAL SIGNS:  Weight 142.4 pounds, blood pressure 143/80, temperature 98.1, pulse mildly elevated at 108, respirations 18, pulse ox is 96% on room air.  GENERAL:  The patient is sitting up in her chair.  She  is alert and oriented and answers questions without difficulty and is in no acute distress.  Review of recent blood pressures over the past week range from 101-156 systolic over 58-96 diastolic.  This is unchanged as she does tend to be quite variable.  HEENT:  Sclerae show no injection or crusting.  NECK:  Supple, without lymphadenopathy, tenderness, fullness or bruit.  HEART:  Regular rate and rhythm, normal S1, S2, no murmur noted at this time.  LUNGS:  Clear to auscultation throughout.  No wheezes, rales or rhonchi.  Normal respiratory effort.  No cough.  ABDOMEN:  Normoactive bowel sounds, soft, without localized tenderness, rebound or guarding.  No flank pain.  EXTREMITIES:  She does have immobilizer on the right lower extremity.  She is noted to have some ankle edema below knee immobilizer but no tenderness.  This was not removed at this time.  Left lower extremity does have support stocking in place but no edema or tenderness.  NEUROLOGIC:  She is alert and oriented and answers questions without difficulty.  PSYCHIATRIC:  Mood and affect are normal.      Hospital discharge notes and lab work are reviewed.    ASSESSMENT AND PLAN:  1.  Status post right distal femur replacement with previous chronic infection.  As noted above, the patient will continue on long-term antibiotic therapy and she will follow up with orthopedic provider as planned.  She will continue with physical and occupational therapy.  2.  Pain has been well controlled and she will continue with Tylenol and we will discontinue the Oxycodone due to nonuse.  3.  Atherosclerotic heart disease and hypertension.  Continue with current medications along with Aspirin and Plavix therapy.  Also, continue Atorvastatin.    4.  Due to her history of anemia and hypokalemia, we will check followup CBC and blood chemistry with next routine blood draw and then make any adjustments to her treatment as needed.  5.  Hypothyroidism, which has been stable and  continue with current medication dosing and routine followup.      Dictated By: Jose A De Jesus MD  Signing Provider: Jose A De Jesus MD    BD/jyoti ([id number])  DD: 07/31/2018 10:45:29 TD: 08/01/2018 06:14:50    Copy Sent To:     cc: [facility]      detailed exam PERRL/conjunctiva clear/EOMI

## 2018-09-10 ENCOUNTER — MEDICATION RENEWAL (OUTPATIENT)
Age: 83
End: 2018-09-10

## 2019-01-01 ENCOUNTER — RX RENEWAL (OUTPATIENT)
Age: 84
End: 2019-01-01

## 2019-01-01 RX ORDER — LOSARTAN POTASSIUM 100 MG/1
100 TABLET, FILM COATED ORAL
Qty: 90 | Refills: 3 | Status: ACTIVE | COMMUNITY
Start: 2019-01-01 | End: 1900-01-01

## 2019-01-01 RX ORDER — HYDROCHLOROTHIAZIDE 25 MG/1
25 TABLET ORAL DAILY
Qty: 90 | Refills: 3 | Status: ACTIVE | COMMUNITY
Start: 2019-01-01 | End: 1900-01-01

## 2019-01-07 ENCOUNTER — RX RENEWAL (OUTPATIENT)
Age: 84
End: 2019-01-07

## 2020-01-01 ENCOUNTER — INPATIENT (INPATIENT)
Facility: HOSPITAL | Age: 85
LOS: 26 days | DRG: 64 | End: 2020-02-19
Attending: INTERNAL MEDICINE | Admitting: SPECIALIST
Payer: MEDICARE

## 2020-01-01 VITALS
RESPIRATION RATE: 25 BRPM | HEART RATE: 108 BPM | SYSTOLIC BLOOD PRESSURE: 155 MMHG | DIASTOLIC BLOOD PRESSURE: 131 MMHG | OXYGEN SATURATION: 97 %

## 2020-01-01 VITALS
RESPIRATION RATE: 22 BRPM | SYSTOLIC BLOOD PRESSURE: 88 MMHG | DIASTOLIC BLOOD PRESSURE: 48 MMHG | OXYGEN SATURATION: 76 % | HEART RATE: 105 BPM | TEMPERATURE: 97 F

## 2020-01-01 DIAGNOSIS — R45.1 RESTLESSNESS AND AGITATION: ICD-10-CM

## 2020-01-01 DIAGNOSIS — I63.412 CEREBRAL INFARCTION DUE TO EMBOLISM OF LEFT MIDDLE CEREBRAL ARTERY: ICD-10-CM

## 2020-01-01 DIAGNOSIS — I63.9 CEREBRAL INFARCTION, UNSPECIFIED: ICD-10-CM

## 2020-01-01 DIAGNOSIS — E11.9 TYPE 2 DIABETES MELLITUS WITHOUT COMPLICATIONS: ICD-10-CM

## 2020-01-01 DIAGNOSIS — Z51.5 ENCOUNTER FOR PALLIATIVE CARE: ICD-10-CM

## 2020-01-01 DIAGNOSIS — R53.2 FUNCTIONAL QUADRIPLEGIA: ICD-10-CM

## 2020-01-01 DIAGNOSIS — Z71.89 OTHER SPECIFIED COUNSELING: ICD-10-CM

## 2020-01-01 DIAGNOSIS — I48.91 UNSPECIFIED ATRIAL FIBRILLATION: ICD-10-CM

## 2020-01-01 DIAGNOSIS — R13.10 DYSPHAGIA, UNSPECIFIED: ICD-10-CM

## 2020-01-01 DIAGNOSIS — I10 ESSENTIAL (PRIMARY) HYPERTENSION: ICD-10-CM

## 2020-01-01 DIAGNOSIS — K11.20 SIALOADENITIS, UNSPECIFIED: ICD-10-CM

## 2020-01-01 DIAGNOSIS — R41.0 DISORIENTATION, UNSPECIFIED: ICD-10-CM

## 2020-01-01 LAB
ALBUMIN SERPL ELPH-MCNC: 3.8 G/DL — SIGNIFICANT CHANGE UP (ref 3.3–5)
ALBUMIN SERPL ELPH-MCNC: 3.8 G/DL — SIGNIFICANT CHANGE UP (ref 3.3–5)
ALP SERPL-CCNC: 84 U/L — SIGNIFICANT CHANGE UP (ref 40–120)
ALP SERPL-CCNC: 97 U/L — SIGNIFICANT CHANGE UP (ref 40–120)
ALT FLD-CCNC: 13 U/L — SIGNIFICANT CHANGE UP (ref 10–45)
ALT FLD-CCNC: 7 U/L — LOW (ref 10–45)
ANION GAP SERPL CALC-SCNC: 10 MMOL/L — SIGNIFICANT CHANGE UP (ref 5–17)
ANION GAP SERPL CALC-SCNC: 12 MMOL/L — SIGNIFICANT CHANGE UP (ref 5–17)
ANION GAP SERPL CALC-SCNC: 12 MMOL/L — SIGNIFICANT CHANGE UP (ref 5–17)
ANION GAP SERPL CALC-SCNC: 13 MMOL/L — SIGNIFICANT CHANGE UP (ref 5–17)
ANION GAP SERPL CALC-SCNC: 15 MMOL/L — SIGNIFICANT CHANGE UP (ref 5–17)
ANION GAP SERPL CALC-SCNC: 15 MMOL/L — SIGNIFICANT CHANGE UP (ref 5–17)
ANION GAP SERPL CALC-SCNC: 16 MMOL/L — SIGNIFICANT CHANGE UP (ref 5–17)
ANION GAP SERPL CALC-SCNC: 17 MMOL/L — SIGNIFICANT CHANGE UP (ref 5–17)
ANION GAP SERPL CALC-SCNC: 17 MMOL/L — SIGNIFICANT CHANGE UP (ref 5–17)
ANION GAP SERPL CALC-SCNC: 19 MMOL/L — HIGH (ref 5–17)
ANION GAP SERPL CALC-SCNC: 19 MMOL/L — HIGH (ref 5–17)
APPEARANCE UR: CLEAR — SIGNIFICANT CHANGE UP
APTT BLD: 30.9 SEC — SIGNIFICANT CHANGE UP (ref 27.5–36.3)
AST SERPL-CCNC: 16 U/L — SIGNIFICANT CHANGE UP (ref 10–40)
AST SERPL-CCNC: 21 U/L — SIGNIFICANT CHANGE UP (ref 10–40)
BACTERIA # UR AUTO: NEGATIVE — SIGNIFICANT CHANGE UP
BASE EXCESS BLDA CALC-SCNC: -3.8 MMOL/L — LOW (ref -2–2)
BASOPHILS # BLD AUTO: 0.03 K/UL — SIGNIFICANT CHANGE UP (ref 0–0.2)
BASOPHILS # BLD AUTO: 0.08 K/UL — SIGNIFICANT CHANGE UP (ref 0–0.2)
BASOPHILS NFR BLD AUTO: 0.4 % — SIGNIFICANT CHANGE UP (ref 0–2)
BASOPHILS NFR BLD AUTO: 1.6 % — SIGNIFICANT CHANGE UP (ref 0–2)
BILIRUB SERPL-MCNC: 0.6 MG/DL — SIGNIFICANT CHANGE UP (ref 0.2–1.2)
BILIRUB SERPL-MCNC: 0.7 MG/DL — SIGNIFICANT CHANGE UP (ref 0.2–1.2)
BILIRUB UR-MCNC: NEGATIVE — SIGNIFICANT CHANGE UP
BLD GP AB SCN SERPL QL: NEGATIVE — SIGNIFICANT CHANGE UP
BUN SERPL-MCNC: 22 MG/DL — SIGNIFICANT CHANGE UP (ref 7–23)
BUN SERPL-MCNC: 24 MG/DL — HIGH (ref 7–23)
BUN SERPL-MCNC: 27 MG/DL — HIGH (ref 7–23)
BUN SERPL-MCNC: 34 MG/DL — HIGH (ref 7–23)
BUN SERPL-MCNC: 36 MG/DL — HIGH (ref 7–23)
BUN SERPL-MCNC: 38 MG/DL — HIGH (ref 7–23)
BUN SERPL-MCNC: 47 MG/DL — HIGH (ref 7–23)
BUN SERPL-MCNC: 48 MG/DL — HIGH (ref 7–23)
BUN SERPL-MCNC: 52 MG/DL — HIGH (ref 7–23)
BUN SERPL-MCNC: 57 MG/DL — HIGH (ref 7–23)
BUN SERPL-MCNC: 57 MG/DL — HIGH (ref 7–23)
BUN SERPL-MCNC: 59 MG/DL — HIGH (ref 7–23)
BUN SERPL-MCNC: 63 MG/DL — HIGH (ref 7–23)
BUN SERPL-MCNC: 65 MG/DL — HIGH (ref 7–23)
BUN SERPL-MCNC: 72 MG/DL — HIGH (ref 7–23)
CA-I BLD-SCNC: 1.28 MMOL/L — SIGNIFICANT CHANGE UP (ref 1.12–1.3)
CALCIUM SERPL-MCNC: 8.7 MG/DL — SIGNIFICANT CHANGE UP (ref 8.4–10.5)
CALCIUM SERPL-MCNC: 8.8 MG/DL — SIGNIFICANT CHANGE UP (ref 8.4–10.5)
CALCIUM SERPL-MCNC: 8.8 MG/DL — SIGNIFICANT CHANGE UP (ref 8.4–10.5)
CALCIUM SERPL-MCNC: 8.9 MG/DL — SIGNIFICANT CHANGE UP (ref 8.4–10.5)
CALCIUM SERPL-MCNC: 8.9 MG/DL — SIGNIFICANT CHANGE UP (ref 8.4–10.5)
CALCIUM SERPL-MCNC: 9 MG/DL — SIGNIFICANT CHANGE UP (ref 8.4–10.5)
CALCIUM SERPL-MCNC: 9.1 MG/DL — SIGNIFICANT CHANGE UP (ref 8.4–10.5)
CALCIUM SERPL-MCNC: 9.2 MG/DL — SIGNIFICANT CHANGE UP (ref 8.4–10.5)
CALCIUM SERPL-MCNC: 9.2 MG/DL — SIGNIFICANT CHANGE UP (ref 8.4–10.5)
CALCIUM SERPL-MCNC: 9.4 MG/DL — SIGNIFICANT CHANGE UP (ref 8.4–10.5)
CALCIUM SERPL-MCNC: 9.4 MG/DL — SIGNIFICANT CHANGE UP (ref 8.4–10.5)
CALCIUM SERPL-MCNC: 9.6 MG/DL — SIGNIFICANT CHANGE UP (ref 8.4–10.5)
CHLORIDE SERPL-SCNC: 102 MMOL/L — SIGNIFICANT CHANGE UP (ref 96–108)
CHLORIDE SERPL-SCNC: 104 MMOL/L — SIGNIFICANT CHANGE UP (ref 96–108)
CHLORIDE SERPL-SCNC: 105 MMOL/L — SIGNIFICANT CHANGE UP (ref 96–108)
CHLORIDE SERPL-SCNC: 109 MMOL/L — HIGH (ref 96–108)
CHLORIDE SERPL-SCNC: 110 MMOL/L — HIGH (ref 96–108)
CHLORIDE SERPL-SCNC: 111 MMOL/L — HIGH (ref 96–108)
CHLORIDE SERPL-SCNC: 112 MMOL/L — HIGH (ref 96–108)
CHLORIDE SERPL-SCNC: 112 MMOL/L — HIGH (ref 96–108)
CHLORIDE SERPL-SCNC: 113 MMOL/L — HIGH (ref 96–108)
CHLORIDE SERPL-SCNC: 115 MMOL/L — HIGH (ref 96–108)
CHLORIDE SERPL-SCNC: 117 MMOL/L — HIGH (ref 96–108)
CHLORIDE SERPL-SCNC: 118 MMOL/L — HIGH (ref 96–108)
CHLORIDE SERPL-SCNC: 98 MMOL/L — SIGNIFICANT CHANGE UP (ref 96–108)
CHLORIDE SERPL-SCNC: 99 MMOL/L — SIGNIFICANT CHANGE UP (ref 96–108)
CHLORIDE SERPL-SCNC: 99 MMOL/L — SIGNIFICANT CHANGE UP (ref 96–108)
CHLORIDE UR-SCNC: 164 MMOL/L — SIGNIFICANT CHANGE UP
CHOLEST SERPL-MCNC: 116 MG/DL — SIGNIFICANT CHANGE UP (ref 10–199)
CO2 BLDA-SCNC: 23 MMOL/L — SIGNIFICANT CHANGE UP (ref 22–30)
CO2 SERPL-SCNC: 17 MMOL/L — LOW (ref 22–31)
CO2 SERPL-SCNC: 19 MMOL/L — LOW (ref 22–31)
CO2 SERPL-SCNC: 20 MMOL/L — LOW (ref 22–31)
CO2 SERPL-SCNC: 21 MMOL/L — LOW (ref 22–31)
CO2 SERPL-SCNC: 21 MMOL/L — LOW (ref 22–31)
CO2 SERPL-SCNC: 22 MMOL/L — SIGNIFICANT CHANGE UP (ref 22–31)
CO2 SERPL-SCNC: 23 MMOL/L — SIGNIFICANT CHANGE UP (ref 22–31)
CO2 SERPL-SCNC: 24 MMOL/L — SIGNIFICANT CHANGE UP (ref 22–31)
CO2 SERPL-SCNC: 24 MMOL/L — SIGNIFICANT CHANGE UP (ref 22–31)
CO2 SERPL-SCNC: 25 MMOL/L — SIGNIFICANT CHANGE UP (ref 22–31)
CO2 SERPL-SCNC: 28 MMOL/L — SIGNIFICANT CHANGE UP (ref 22–31)
COLOR SPEC: SIGNIFICANT CHANGE UP
CREAT ?TM UR-MCNC: 22 MG/DL — SIGNIFICANT CHANGE UP
CREAT SERPL-MCNC: 0.86 MG/DL — SIGNIFICANT CHANGE UP (ref 0.5–1.3)
CREAT SERPL-MCNC: 0.94 MG/DL — SIGNIFICANT CHANGE UP (ref 0.5–1.3)
CREAT SERPL-MCNC: 0.98 MG/DL — SIGNIFICANT CHANGE UP (ref 0.5–1.3)
CREAT SERPL-MCNC: 1.01 MG/DL — SIGNIFICANT CHANGE UP (ref 0.5–1.3)
CREAT SERPL-MCNC: 1.05 MG/DL — SIGNIFICANT CHANGE UP (ref 0.5–1.3)
CREAT SERPL-MCNC: 1.09 MG/DL — SIGNIFICANT CHANGE UP (ref 0.5–1.3)
CREAT SERPL-MCNC: 1.19 MG/DL — SIGNIFICANT CHANGE UP (ref 0.5–1.3)
CREAT SERPL-MCNC: 1.21 MG/DL — SIGNIFICANT CHANGE UP (ref 0.5–1.3)
CREAT SERPL-MCNC: 1.27 MG/DL — SIGNIFICANT CHANGE UP (ref 0.5–1.3)
CREAT SERPL-MCNC: 1.29 MG/DL — SIGNIFICANT CHANGE UP (ref 0.5–1.3)
CREAT SERPL-MCNC: 1.33 MG/DL — HIGH (ref 0.5–1.3)
CREAT SERPL-MCNC: 1.38 MG/DL — HIGH (ref 0.5–1.3)
CREAT SERPL-MCNC: 1.57 MG/DL — HIGH (ref 0.5–1.3)
CREAT SERPL-MCNC: 1.61 MG/DL — HIGH (ref 0.5–1.3)
CREAT SERPL-MCNC: 1.67 MG/DL — HIGH (ref 0.5–1.3)
CULTURE RESULTS: SIGNIFICANT CHANGE UP
CULTURE RESULTS: SIGNIFICANT CHANGE UP
DIFF PNL FLD: ABNORMAL
EOSINOPHIL # BLD AUTO: 0.01 K/UL — SIGNIFICANT CHANGE UP (ref 0–0.5)
EOSINOPHIL # BLD AUTO: 0.24 K/UL — SIGNIFICANT CHANGE UP (ref 0–0.5)
EOSINOPHIL NFR BLD AUTO: 0.1 % — SIGNIFICANT CHANGE UP (ref 0–6)
EOSINOPHIL NFR BLD AUTO: 4.9 % — SIGNIFICANT CHANGE UP (ref 0–6)
EPI CELLS # UR: 0 /HPF — SIGNIFICANT CHANGE UP
ETHANOL SERPL-MCNC: SIGNIFICANT CHANGE UP MG/DL (ref 0–10)
GAS PNL BLDA: SIGNIFICANT CHANGE UP
GLUCOSE SERPL-MCNC: 100 MG/DL — HIGH (ref 70–99)
GLUCOSE SERPL-MCNC: 103 MG/DL — HIGH (ref 70–99)
GLUCOSE SERPL-MCNC: 104 MG/DL — HIGH (ref 70–99)
GLUCOSE SERPL-MCNC: 104 MG/DL — HIGH (ref 70–99)
GLUCOSE SERPL-MCNC: 107 MG/DL — HIGH (ref 70–99)
GLUCOSE SERPL-MCNC: 111 MG/DL — HIGH (ref 70–99)
GLUCOSE SERPL-MCNC: 153 MG/DL — HIGH (ref 70–99)
GLUCOSE SERPL-MCNC: 76 MG/DL — SIGNIFICANT CHANGE UP (ref 70–99)
GLUCOSE SERPL-MCNC: 79 MG/DL — SIGNIFICANT CHANGE UP (ref 70–99)
GLUCOSE SERPL-MCNC: 81 MG/DL — SIGNIFICANT CHANGE UP (ref 70–99)
GLUCOSE SERPL-MCNC: 86 MG/DL — SIGNIFICANT CHANGE UP (ref 70–99)
GLUCOSE SERPL-MCNC: 89 MG/DL — SIGNIFICANT CHANGE UP (ref 70–99)
GLUCOSE SERPL-MCNC: 90 MG/DL — SIGNIFICANT CHANGE UP (ref 70–99)
GLUCOSE SERPL-MCNC: 93 MG/DL — SIGNIFICANT CHANGE UP (ref 70–99)
GLUCOSE SERPL-MCNC: 95 MG/DL — SIGNIFICANT CHANGE UP (ref 70–99)
GLUCOSE UR QL: NEGATIVE — SIGNIFICANT CHANGE UP
HBA1C BLD-MCNC: 5.2 % — SIGNIFICANT CHANGE UP (ref 4–5.6)
HBA1C BLD-MCNC: 5.2 % — SIGNIFICANT CHANGE UP (ref 4–5.6)
HCO3 BLDA-SCNC: 21 MMOL/L — SIGNIFICANT CHANGE UP (ref 21–29)
HCT VFR BLD CALC: 31.2 % — LOW (ref 34.5–45)
HCT VFR BLD CALC: 32.9 % — LOW (ref 34.5–45)
HCT VFR BLD CALC: 33.2 % — LOW (ref 34.5–45)
HCT VFR BLD CALC: 34.2 % — LOW (ref 34.5–45)
HCT VFR BLD CALC: 35.1 % — SIGNIFICANT CHANGE UP (ref 34.5–45)
HCT VFR BLD CALC: 35.7 % — SIGNIFICANT CHANGE UP (ref 34.5–45)
HCT VFR BLD CALC: 38.5 % — SIGNIFICANT CHANGE UP (ref 34.5–45)
HCT VFR BLD CALC: 40 % — SIGNIFICANT CHANGE UP (ref 34.5–45)
HCT VFR BLD CALC: 41 % — SIGNIFICANT CHANGE UP (ref 34.5–45)
HCT VFR BLD CALC: 41.5 % — SIGNIFICANT CHANGE UP (ref 34.5–45)
HDLC SERPL-MCNC: 58 MG/DL — SIGNIFICANT CHANGE UP
HGB BLD-MCNC: 10.2 G/DL — LOW (ref 11.5–15.5)
HGB BLD-MCNC: 10.4 G/DL — LOW (ref 11.5–15.5)
HGB BLD-MCNC: 10.9 G/DL — LOW (ref 11.5–15.5)
HGB BLD-MCNC: 11.1 G/DL — LOW (ref 11.5–15.5)
HGB BLD-MCNC: 11.3 G/DL — LOW (ref 11.5–15.5)
HGB BLD-MCNC: 11.7 G/DL — SIGNIFICANT CHANGE UP (ref 11.5–15.5)
HGB BLD-MCNC: 12 G/DL — SIGNIFICANT CHANGE UP (ref 11.5–15.5)
HGB BLD-MCNC: 12.8 G/DL — SIGNIFICANT CHANGE UP (ref 11.5–15.5)
HGB BLD-MCNC: 12.9 G/DL — SIGNIFICANT CHANGE UP (ref 11.5–15.5)
HGB BLD-MCNC: 9.8 G/DL — LOW (ref 11.5–15.5)
HOROWITZ INDEX BLDA+IHG-RTO: 32 — SIGNIFICANT CHANGE UP
HYALINE CASTS # UR AUTO: 0 /LPF — SIGNIFICANT CHANGE UP (ref 0–2)
IMM GRANULOCYTES NFR BLD AUTO: 0.2 % — SIGNIFICANT CHANGE UP (ref 0–1.5)
IMM GRANULOCYTES NFR BLD AUTO: 0.8 % — SIGNIFICANT CHANGE UP (ref 0–1.5)
INR BLD: 1.05 RATIO — SIGNIFICANT CHANGE UP (ref 0.88–1.16)
KETONES UR-MCNC: NEGATIVE — SIGNIFICANT CHANGE UP
LEUKOCYTE ESTERASE UR-ACNC: NEGATIVE — SIGNIFICANT CHANGE UP
LIPID PNL WITH DIRECT LDL SERPL: 40 MG/DL — SIGNIFICANT CHANGE UP
LYMPHOCYTES # BLD AUTO: 0.62 K/UL — LOW (ref 1–3.3)
LYMPHOCYTES # BLD AUTO: 1.04 K/UL — SIGNIFICANT CHANGE UP (ref 1–3.3)
LYMPHOCYTES # BLD AUTO: 21.3 % — SIGNIFICANT CHANGE UP (ref 13–44)
LYMPHOCYTES # BLD AUTO: 7.8 % — LOW (ref 13–44)
MAGNESIUM SERPL-MCNC: 1.6 MG/DL — SIGNIFICANT CHANGE UP (ref 1.6–2.6)
MAGNESIUM SERPL-MCNC: 1.8 MG/DL — SIGNIFICANT CHANGE UP (ref 1.6–2.6)
MAGNESIUM SERPL-MCNC: 2 MG/DL — SIGNIFICANT CHANGE UP (ref 1.6–2.6)
MAGNESIUM SERPL-MCNC: 2.1 MG/DL — SIGNIFICANT CHANGE UP (ref 1.6–2.6)
MCHC RBC-ENTMCNC: 28.1 PG — SIGNIFICANT CHANGE UP (ref 27–34)
MCHC RBC-ENTMCNC: 28.2 PG — SIGNIFICANT CHANGE UP (ref 27–34)
MCHC RBC-ENTMCNC: 28.2 PG — SIGNIFICANT CHANGE UP (ref 27–34)
MCHC RBC-ENTMCNC: 28.3 PG — SIGNIFICANT CHANGE UP (ref 27–34)
MCHC RBC-ENTMCNC: 28.4 PG — SIGNIFICANT CHANGE UP (ref 27–34)
MCHC RBC-ENTMCNC: 28.4 PG — SIGNIFICANT CHANGE UP (ref 27–34)
MCHC RBC-ENTMCNC: 28.5 GM/DL — LOW (ref 32–36)
MCHC RBC-ENTMCNC: 28.5 PG — SIGNIFICANT CHANGE UP (ref 27–34)
MCHC RBC-ENTMCNC: 28.7 PG — SIGNIFICANT CHANGE UP (ref 27–34)
MCHC RBC-ENTMCNC: 30.7 GM/DL — LOW (ref 32–36)
MCHC RBC-ENTMCNC: 31.1 GM/DL — LOW (ref 32–36)
MCHC RBC-ENTMCNC: 31.2 GM/DL — LOW (ref 32–36)
MCHC RBC-ENTMCNC: 31.4 GM/DL — LOW (ref 32–36)
MCHC RBC-ENTMCNC: 31.6 GM/DL — LOW (ref 32–36)
MCHC RBC-ENTMCNC: 31.6 GM/DL — LOW (ref 32–36)
MCHC RBC-ENTMCNC: 31.7 GM/DL — LOW (ref 32–36)
MCHC RBC-ENTMCNC: 31.9 GM/DL — LOW (ref 32–36)
MCHC RBC-ENTMCNC: 32 GM/DL — SIGNIFICANT CHANGE UP (ref 32–36)
MCV RBC AUTO: 88.6 FL — SIGNIFICANT CHANGE UP (ref 80–100)
MCV RBC AUTO: 88.9 FL — SIGNIFICANT CHANGE UP (ref 80–100)
MCV RBC AUTO: 89 FL — SIGNIFICANT CHANGE UP (ref 80–100)
MCV RBC AUTO: 89.6 FL — SIGNIFICANT CHANGE UP (ref 80–100)
MCV RBC AUTO: 90.4 FL — SIGNIFICANT CHANGE UP (ref 80–100)
MCV RBC AUTO: 90.7 FL — SIGNIFICANT CHANGE UP (ref 80–100)
MCV RBC AUTO: 90.8 FL — SIGNIFICANT CHANGE UP (ref 80–100)
MCV RBC AUTO: 91 FL — SIGNIFICANT CHANGE UP (ref 80–100)
MCV RBC AUTO: 91.5 FL — SIGNIFICANT CHANGE UP (ref 80–100)
MCV RBC AUTO: 99.8 FL — SIGNIFICANT CHANGE UP (ref 80–100)
MONOCYTES # BLD AUTO: 0.5 K/UL — SIGNIFICANT CHANGE UP (ref 0–0.9)
MONOCYTES # BLD AUTO: 0.53 K/UL — SIGNIFICANT CHANGE UP (ref 0–0.9)
MONOCYTES NFR BLD AUTO: 10.2 % — SIGNIFICANT CHANGE UP (ref 2–14)
MONOCYTES NFR BLD AUTO: 6.7 % — SIGNIFICANT CHANGE UP (ref 2–14)
NEUTROPHILS # BLD AUTO: 3.01 K/UL — SIGNIFICANT CHANGE UP (ref 1.8–7.4)
NEUTROPHILS # BLD AUTO: 6.71 K/UL — SIGNIFICANT CHANGE UP (ref 1.8–7.4)
NEUTROPHILS NFR BLD AUTO: 61.8 % — SIGNIFICANT CHANGE UP (ref 43–77)
NEUTROPHILS NFR BLD AUTO: 84.2 % — HIGH (ref 43–77)
NITRITE UR-MCNC: NEGATIVE — SIGNIFICANT CHANGE UP
NRBC # BLD: 0 /100 WBCS — SIGNIFICANT CHANGE UP (ref 0–0)
OSMOLALITY UR: 524 MOSM/KG — SIGNIFICANT CHANGE UP (ref 50–1200)
OSMOLALITY UR: 537 MOS/KG — SIGNIFICANT CHANGE UP (ref 300–900)
PCO2 BLDA: 42 MMHG — SIGNIFICANT CHANGE UP (ref 32–46)
PH BLDA: 7.32 — LOW (ref 7.35–7.45)
PH UR: 6 — SIGNIFICANT CHANGE UP (ref 5–8)
PHOSPHATE SERPL-MCNC: 3 MG/DL — SIGNIFICANT CHANGE UP (ref 2.5–4.5)
PHOSPHATE SERPL-MCNC: 3 MG/DL — SIGNIFICANT CHANGE UP (ref 2.5–4.5)
PLATELET # BLD AUTO: 108 K/UL — LOW (ref 150–400)
PLATELET # BLD AUTO: 111 K/UL — LOW (ref 150–400)
PLATELET # BLD AUTO: 114 K/UL — LOW (ref 150–400)
PLATELET # BLD AUTO: 119 K/UL — LOW (ref 150–400)
PLATELET # BLD AUTO: 125 K/UL — LOW (ref 150–400)
PLATELET # BLD AUTO: 141 K/UL — LOW (ref 150–400)
PLATELET # BLD AUTO: 143 K/UL — LOW (ref 150–400)
PLATELET # BLD AUTO: 147 K/UL — LOW (ref 150–400)
PLATELET # BLD AUTO: 149 K/UL — LOW (ref 150–400)
PLATELET # BLD AUTO: 164 K/UL — SIGNIFICANT CHANGE UP (ref 150–400)
PO2 BLDA: 104 MMHG — SIGNIFICANT CHANGE UP (ref 74–108)
POTASSIUM SERPL-MCNC: 3.1 MMOL/L — LOW (ref 3.5–5.3)
POTASSIUM SERPL-MCNC: 3.2 MMOL/L — LOW (ref 3.5–5.3)
POTASSIUM SERPL-MCNC: 3.2 MMOL/L — LOW (ref 3.5–5.3)
POTASSIUM SERPL-MCNC: 3.3 MMOL/L — LOW (ref 3.5–5.3)
POTASSIUM SERPL-MCNC: 3.3 MMOL/L — LOW (ref 3.5–5.3)
POTASSIUM SERPL-MCNC: 3.4 MMOL/L — LOW (ref 3.5–5.3)
POTASSIUM SERPL-MCNC: 3.4 MMOL/L — LOW (ref 3.5–5.3)
POTASSIUM SERPL-MCNC: 3.5 MMOL/L — SIGNIFICANT CHANGE UP (ref 3.5–5.3)
POTASSIUM SERPL-MCNC: 3.5 MMOL/L — SIGNIFICANT CHANGE UP (ref 3.5–5.3)
POTASSIUM SERPL-MCNC: 3.6 MMOL/L — SIGNIFICANT CHANGE UP (ref 3.5–5.3)
POTASSIUM SERPL-MCNC: 3.6 MMOL/L — SIGNIFICANT CHANGE UP (ref 3.5–5.3)
POTASSIUM SERPL-MCNC: 3.8 MMOL/L — SIGNIFICANT CHANGE UP (ref 3.5–5.3)
POTASSIUM SERPL-MCNC: 4.2 MMOL/L — SIGNIFICANT CHANGE UP (ref 3.5–5.3)
POTASSIUM SERPL-MCNC: 4.6 MMOL/L — SIGNIFICANT CHANGE UP (ref 3.5–5.3)
POTASSIUM SERPL-MCNC: 4.7 MMOL/L — SIGNIFICANT CHANGE UP (ref 3.5–5.3)
POTASSIUM SERPL-SCNC: 3.1 MMOL/L — LOW (ref 3.5–5.3)
POTASSIUM SERPL-SCNC: 3.2 MMOL/L — LOW (ref 3.5–5.3)
POTASSIUM SERPL-SCNC: 3.2 MMOL/L — LOW (ref 3.5–5.3)
POTASSIUM SERPL-SCNC: 3.3 MMOL/L — LOW (ref 3.5–5.3)
POTASSIUM SERPL-SCNC: 3.3 MMOL/L — LOW (ref 3.5–5.3)
POTASSIUM SERPL-SCNC: 3.4 MMOL/L — LOW (ref 3.5–5.3)
POTASSIUM SERPL-SCNC: 3.4 MMOL/L — LOW (ref 3.5–5.3)
POTASSIUM SERPL-SCNC: 3.5 MMOL/L — SIGNIFICANT CHANGE UP (ref 3.5–5.3)
POTASSIUM SERPL-SCNC: 3.5 MMOL/L — SIGNIFICANT CHANGE UP (ref 3.5–5.3)
POTASSIUM SERPL-SCNC: 3.6 MMOL/L — SIGNIFICANT CHANGE UP (ref 3.5–5.3)
POTASSIUM SERPL-SCNC: 3.6 MMOL/L — SIGNIFICANT CHANGE UP (ref 3.5–5.3)
POTASSIUM SERPL-SCNC: 3.8 MMOL/L — SIGNIFICANT CHANGE UP (ref 3.5–5.3)
POTASSIUM SERPL-SCNC: 4.2 MMOL/L — SIGNIFICANT CHANGE UP (ref 3.5–5.3)
POTASSIUM SERPL-SCNC: 4.6 MMOL/L — SIGNIFICANT CHANGE UP (ref 3.5–5.3)
POTASSIUM SERPL-SCNC: 4.7 MMOL/L — SIGNIFICANT CHANGE UP (ref 3.5–5.3)
POTASSIUM UR-SCNC: 16 MMOL/L — SIGNIFICANT CHANGE UP
PREALB SERPL-MCNC: 14 MG/DL — LOW (ref 20–40)
PROT SERPL-MCNC: 7.2 G/DL — SIGNIFICANT CHANGE UP (ref 6–8.3)
PROT SERPL-MCNC: 7.8 G/DL — SIGNIFICANT CHANGE UP (ref 6–8.3)
PROT UR-MCNC: ABNORMAL
PROTHROM AB SERPL-ACNC: 12 SEC — SIGNIFICANT CHANGE UP (ref 10–12.9)
RAPID RVP RESULT: SIGNIFICANT CHANGE UP
RBC # BLD: 3.45 M/UL — LOW (ref 3.8–5.2)
RBC # BLD: 3.63 M/UL — LOW (ref 3.8–5.2)
RBC # BLD: 3.67 M/UL — LOW (ref 3.8–5.2)
RBC # BLD: 3.86 M/UL — SIGNIFICANT CHANGE UP (ref 3.8–5.2)
RBC # BLD: 3.87 M/UL — SIGNIFICANT CHANGE UP (ref 3.8–5.2)
RBC # BLD: 4.01 M/UL — SIGNIFICANT CHANGE UP (ref 3.8–5.2)
RBC # BLD: 4.11 M/UL — SIGNIFICANT CHANGE UP (ref 3.8–5.2)
RBC # BLD: 4.24 M/UL — SIGNIFICANT CHANGE UP (ref 3.8–5.2)
RBC # BLD: 4.5 M/UL — SIGNIFICANT CHANGE UP (ref 3.8–5.2)
RBC # BLD: 4.56 M/UL — SIGNIFICANT CHANGE UP (ref 3.8–5.2)
RBC # FLD: 14.5 % — SIGNIFICANT CHANGE UP (ref 10.3–14.5)
RBC # FLD: 14.6 % — HIGH (ref 10.3–14.5)
RBC # FLD: 14.7 % — HIGH (ref 10.3–14.5)
RBC # FLD: 14.7 % — HIGH (ref 10.3–14.5)
RBC # FLD: 14.8 % — HIGH (ref 10.3–14.5)
RBC # FLD: 14.9 % — HIGH (ref 10.3–14.5)
RBC # FLD: 15 % — HIGH (ref 10.3–14.5)
RBC CASTS # UR COMP ASSIST: 15 /HPF — HIGH (ref 0–4)
RH IG SCN BLD-IMP: POSITIVE — SIGNIFICANT CHANGE UP
SAO2 % BLDA: 98 % — HIGH (ref 92–96)
SODIUM SERPL-SCNC: 138 MMOL/L — SIGNIFICANT CHANGE UP (ref 135–145)
SODIUM SERPL-SCNC: 139 MMOL/L — SIGNIFICANT CHANGE UP (ref 135–145)
SODIUM SERPL-SCNC: 139 MMOL/L — SIGNIFICANT CHANGE UP (ref 135–145)
SODIUM SERPL-SCNC: 142 MMOL/L — SIGNIFICANT CHANGE UP (ref 135–145)
SODIUM SERPL-SCNC: 146 MMOL/L — HIGH (ref 135–145)
SODIUM SERPL-SCNC: 146 MMOL/L — HIGH (ref 135–145)
SODIUM SERPL-SCNC: 147 MMOL/L — HIGH (ref 135–145)
SODIUM SERPL-SCNC: 150 MMOL/L — HIGH (ref 135–145)
SODIUM SERPL-SCNC: 150 MMOL/L — HIGH (ref 135–145)
SODIUM SERPL-SCNC: 152 MMOL/L — HIGH (ref 135–145)
SODIUM SERPL-SCNC: 152 MMOL/L — HIGH (ref 135–145)
SODIUM SERPL-SCNC: 153 MMOL/L — HIGH (ref 135–145)
SODIUM SERPL-SCNC: 157 MMOL/L — HIGH (ref 135–145)
SODIUM UR-SCNC: 168 MMOL/L — SIGNIFICANT CHANGE UP
SP GR SPEC: 1.03 — HIGH (ref 1.01–1.02)
SPECIMEN SOURCE: SIGNIFICANT CHANGE UP
SPECIMEN SOURCE: SIGNIFICANT CHANGE UP
T4 FREE SERPL-MCNC: 1.6 NG/DL — SIGNIFICANT CHANGE UP (ref 0.9–1.8)
TOTAL CHOLESTEROL/HDL RATIO MEASUREMENT: 2 RATIO — LOW (ref 3.3–7.1)
TRIGL SERPL-MCNC: 153 MG/DL — HIGH (ref 10–149)
TRIGL SERPL-MCNC: 168 MG/DL — HIGH (ref 10–149)
TRIGL SERPL-MCNC: 91 MG/DL — SIGNIFICANT CHANGE UP (ref 10–149)
TSH SERPL-MCNC: 1.8 UIU/ML — SIGNIFICANT CHANGE UP (ref 0.27–4.2)
UROBILINOGEN FLD QL: NEGATIVE — SIGNIFICANT CHANGE UP
UUN UR-MCNC: 495 MG/DL — SIGNIFICANT CHANGE UP
UUN UR-MCNC: 678 MG/DL — SIGNIFICANT CHANGE UP
VALPROATE SERPL-MCNC: 51 UG/ML — SIGNIFICANT CHANGE UP (ref 50–100)
VALPROATE SERPL-MCNC: 8 UG/ML — LOW (ref 50–100)
WBC # BLD: 11.99 K/UL — HIGH (ref 3.8–10.5)
WBC # BLD: 4.88 K/UL — SIGNIFICANT CHANGE UP (ref 3.8–10.5)
WBC # BLD: 5.34 K/UL — SIGNIFICANT CHANGE UP (ref 3.8–10.5)
WBC # BLD: 6.4 K/UL — SIGNIFICANT CHANGE UP (ref 3.8–10.5)
WBC # BLD: 6.41 K/UL — SIGNIFICANT CHANGE UP (ref 3.8–10.5)
WBC # BLD: 6.91 K/UL — SIGNIFICANT CHANGE UP (ref 3.8–10.5)
WBC # BLD: 7.1 K/UL — SIGNIFICANT CHANGE UP (ref 3.8–10.5)
WBC # BLD: 7.96 K/UL — SIGNIFICANT CHANGE UP (ref 3.8–10.5)
WBC # BLD: 8.48 K/UL — SIGNIFICANT CHANGE UP (ref 3.8–10.5)
WBC # BLD: 8.75 K/UL — SIGNIFICANT CHANGE UP (ref 3.8–10.5)
WBC # FLD AUTO: 11.99 K/UL — HIGH (ref 3.8–10.5)
WBC # FLD AUTO: 4.88 K/UL — SIGNIFICANT CHANGE UP (ref 3.8–10.5)
WBC # FLD AUTO: 5.34 K/UL — SIGNIFICANT CHANGE UP (ref 3.8–10.5)
WBC # FLD AUTO: 6.4 K/UL — SIGNIFICANT CHANGE UP (ref 3.8–10.5)
WBC # FLD AUTO: 6.41 K/UL — SIGNIFICANT CHANGE UP (ref 3.8–10.5)
WBC # FLD AUTO: 6.91 K/UL — SIGNIFICANT CHANGE UP (ref 3.8–10.5)
WBC # FLD AUTO: 7.1 K/UL — SIGNIFICANT CHANGE UP (ref 3.8–10.5)
WBC # FLD AUTO: 7.96 K/UL — SIGNIFICANT CHANGE UP (ref 3.8–10.5)
WBC # FLD AUTO: 8.48 K/UL — SIGNIFICANT CHANGE UP (ref 3.8–10.5)
WBC # FLD AUTO: 8.75 K/UL — SIGNIFICANT CHANGE UP (ref 3.8–10.5)
WBC UR QL: 0 /HPF — SIGNIFICANT CHANGE UP (ref 0–5)

## 2020-01-01 PROCEDURE — 94640 AIRWAY INHALATION TREATMENT: CPT

## 2020-01-01 PROCEDURE — 99233 SBSQ HOSP IP/OBS HIGH 50: CPT | Mod: GC

## 2020-01-01 PROCEDURE — 99232 SBSQ HOSP IP/OBS MODERATE 35: CPT | Mod: GC

## 2020-01-01 PROCEDURE — 86900 BLOOD TYPING SEROLOGIC ABO: CPT

## 2020-01-01 PROCEDURE — 95816 EEG AWAKE AND DROWSY: CPT

## 2020-01-01 PROCEDURE — 99233 SBSQ HOSP IP/OBS HIGH 50: CPT

## 2020-01-01 PROCEDURE — 93005 ELECTROCARDIOGRAM TRACING: CPT

## 2020-01-01 PROCEDURE — 84300 ASSAY OF URINE SODIUM: CPT

## 2020-01-01 PROCEDURE — 99497 ADVNCD CARE PLAN 30 MIN: CPT | Mod: 25

## 2020-01-01 PROCEDURE — 70496 CT ANGIOGRAPHY HEAD: CPT

## 2020-01-01 PROCEDURE — 82436 ASSAY OF URINE CHLORIDE: CPT

## 2020-01-01 PROCEDURE — 70450 CT HEAD/BRAIN W/O DYE: CPT | Mod: 26

## 2020-01-01 PROCEDURE — 71045 X-RAY EXAM CHEST 1 VIEW: CPT

## 2020-01-01 PROCEDURE — 80061 LIPID PANEL: CPT

## 2020-01-01 PROCEDURE — 81001 URINALYSIS AUTO W/SCOPE: CPT

## 2020-01-01 PROCEDURE — 82803 BLOOD GASES ANY COMBINATION: CPT

## 2020-01-01 PROCEDURE — 99232 SBSQ HOSP IP/OBS MODERATE 35: CPT

## 2020-01-01 PROCEDURE — ZZZZZ: CPT

## 2020-01-01 PROCEDURE — 71045 X-RAY EXAM CHEST 1 VIEW: CPT | Mod: 26

## 2020-01-01 PROCEDURE — 93306 TTE W/DOPPLER COMPLETE: CPT | Mod: 26

## 2020-01-01 PROCEDURE — 86901 BLOOD TYPING SEROLOGIC RH(D): CPT

## 2020-01-01 PROCEDURE — 87798 DETECT AGENT NOS DNA AMP: CPT

## 2020-01-01 PROCEDURE — 80048 BASIC METABOLIC PNL TOTAL CA: CPT

## 2020-01-01 PROCEDURE — 93010 ELECTROCARDIOGRAM REPORT: CPT

## 2020-01-01 PROCEDURE — 83735 ASSAY OF MAGNESIUM: CPT

## 2020-01-01 PROCEDURE — 87633 RESP VIRUS 12-25 TARGETS: CPT

## 2020-01-01 PROCEDURE — 97110 THERAPEUTIC EXERCISES: CPT

## 2020-01-01 PROCEDURE — 70498 CT ANGIOGRAPHY NECK: CPT

## 2020-01-01 PROCEDURE — 92526 ORAL FUNCTION THERAPY: CPT

## 2020-01-01 PROCEDURE — 99222 1ST HOSP IP/OBS MODERATE 55: CPT

## 2020-01-01 PROCEDURE — 86850 RBC ANTIBODY SCREEN: CPT

## 2020-01-01 PROCEDURE — 84439 ASSAY OF FREE THYROXINE: CPT

## 2020-01-01 PROCEDURE — 95816 EEG AWAKE AND DROWSY: CPT | Mod: 26

## 2020-01-01 PROCEDURE — 93306 TTE W/DOPPLER COMPLETE: CPT

## 2020-01-01 PROCEDURE — 84134 ASSAY OF PREALBUMIN: CPT

## 2020-01-01 PROCEDURE — 85610 PROTHROMBIN TIME: CPT

## 2020-01-01 PROCEDURE — 70496 CT ANGIOGRAPHY HEAD: CPT | Mod: 26

## 2020-01-01 PROCEDURE — 99358 PROLONG SERVICE W/O CONTACT: CPT

## 2020-01-01 PROCEDURE — 99221 1ST HOSP IP/OBS SF/LOW 40: CPT

## 2020-01-01 PROCEDURE — 36600 WITHDRAWAL OF ARTERIAL BLOOD: CPT

## 2020-01-01 PROCEDURE — 84443 ASSAY THYROID STIM HORMONE: CPT

## 2020-01-01 PROCEDURE — 99223 1ST HOSP IP/OBS HIGH 75: CPT | Mod: GC

## 2020-01-01 PROCEDURE — 84540 ASSAY OF URINE/UREA-N: CPT

## 2020-01-01 PROCEDURE — 84133 ASSAY OF URINE POTASSIUM: CPT

## 2020-01-01 PROCEDURE — 97162 PT EVAL MOD COMPLEX 30 MIN: CPT

## 2020-01-01 PROCEDURE — 83935 ASSAY OF URINE OSMOLALITY: CPT

## 2020-01-01 PROCEDURE — 99291 CRITICAL CARE FIRST HOUR: CPT

## 2020-01-01 PROCEDURE — 84478 ASSAY OF TRIGLYCERIDES: CPT

## 2020-01-01 PROCEDURE — 70450 CT HEAD/BRAIN W/O DYE: CPT | Mod: 26,59,77

## 2020-01-01 PROCEDURE — 95711 VEEG 2-12 HR UNMONITORED: CPT

## 2020-01-01 PROCEDURE — 70450 CT HEAD/BRAIN W/O DYE: CPT

## 2020-01-01 PROCEDURE — 85730 THROMBOPLASTIN TIME PARTIAL: CPT

## 2020-01-01 PROCEDURE — 99291 CRITICAL CARE FIRST HOUR: CPT | Mod: 25

## 2020-01-01 PROCEDURE — 85027 COMPLETE CBC AUTOMATED: CPT

## 2020-01-01 PROCEDURE — 99497 ADVNCD CARE PLAN 30 MIN: CPT

## 2020-01-01 PROCEDURE — 80164 ASSAY DIPROPYLACETIC ACD TOT: CPT

## 2020-01-01 PROCEDURE — 83036 HEMOGLOBIN GLYCOSYLATED A1C: CPT

## 2020-01-01 PROCEDURE — 87040 BLOOD CULTURE FOR BACTERIA: CPT

## 2020-01-01 PROCEDURE — 92610 EVALUATE SWALLOWING FUNCTION: CPT

## 2020-01-01 PROCEDURE — 97535 SELF CARE MNGMENT TRAINING: CPT

## 2020-01-01 PROCEDURE — 72125 CT NECK SPINE W/O DYE: CPT | Mod: 26

## 2020-01-01 PROCEDURE — 72125 CT NECK SPINE W/O DYE: CPT

## 2020-01-01 PROCEDURE — 80307 DRUG TEST PRSMV CHEM ANLYZR: CPT

## 2020-01-01 PROCEDURE — 82962 GLUCOSE BLOOD TEST: CPT

## 2020-01-01 PROCEDURE — 97530 THERAPEUTIC ACTIVITIES: CPT

## 2020-01-01 PROCEDURE — 82570 ASSAY OF URINE CREATININE: CPT

## 2020-01-01 PROCEDURE — 97167 OT EVAL HIGH COMPLEX 60 MIN: CPT

## 2020-01-01 PROCEDURE — 80053 COMPREHEN METABOLIC PANEL: CPT

## 2020-01-01 PROCEDURE — 70498 CT ANGIOGRAPHY NECK: CPT | Mod: 26

## 2020-01-01 PROCEDURE — 95718 EEG PHYS/QHP 2-12 HR W/VEEG: CPT

## 2020-01-01 PROCEDURE — 99238 HOSP IP/OBS DSCHRG MGMT 30/<: CPT | Mod: GC

## 2020-01-01 PROCEDURE — 99223 1ST HOSP IP/OBS HIGH 75: CPT

## 2020-01-01 PROCEDURE — 87581 M.PNEUMON DNA AMP PROBE: CPT

## 2020-01-01 PROCEDURE — 82330 ASSAY OF CALCIUM: CPT

## 2020-01-01 PROCEDURE — 84100 ASSAY OF PHOSPHORUS: CPT

## 2020-01-01 PROCEDURE — 87486 CHLMYD PNEUM DNA AMP PROBE: CPT

## 2020-01-01 RX ORDER — MORPHINE SULFATE 50 MG/1
0.5 CAPSULE, EXTENDED RELEASE ORAL
Refills: 0 | Status: DISCONTINUED | OUTPATIENT
Start: 2020-01-01 | End: 2020-01-01

## 2020-01-01 RX ORDER — POTASSIUM CHLORIDE 20 MEQ
10 PACKET (EA) ORAL
Refills: 0 | Status: COMPLETED | OUTPATIENT
Start: 2020-01-01 | End: 2020-01-01

## 2020-01-01 RX ORDER — NITROGLYCERIN 6.5 MG
0.5 CAPSULE, EXTENDED RELEASE ORAL DAILY
Refills: 0 | Status: DISCONTINUED | OUTPATIENT
Start: 2020-01-01 | End: 2020-01-01

## 2020-01-01 RX ORDER — LABETALOL HCL 100 MG
10 TABLET ORAL ONCE
Refills: 0 | Status: COMPLETED | OUTPATIENT
Start: 2020-01-01 | End: 2020-01-01

## 2020-01-01 RX ORDER — HALOPERIDOL DECANOATE 100 MG/ML
1 INJECTION INTRAMUSCULAR ONCE
Refills: 0 | Status: COMPLETED | OUTPATIENT
Start: 2020-01-01 | End: 2020-01-01

## 2020-01-01 RX ORDER — VALPROIC ACID (AS SODIUM SALT) 250 MG/5ML
125 SOLUTION, ORAL ORAL THREE TIMES A DAY
Refills: 0 | Status: DISCONTINUED | OUTPATIENT
Start: 2020-01-01 | End: 2020-01-01

## 2020-01-01 RX ORDER — DIVALPROEX SODIUM 500 MG/1
125 TABLET, DELAYED RELEASE ORAL THREE TIMES A DAY
Refills: 0 | Status: DISCONTINUED | OUTPATIENT
Start: 2020-01-01 | End: 2020-01-01

## 2020-01-01 RX ORDER — ACETAMINOPHEN 500 MG
1000 TABLET ORAL ONCE
Refills: 0 | Status: COMPLETED | OUTPATIENT
Start: 2020-01-01 | End: 2020-01-01

## 2020-01-01 RX ORDER — POTASSIUM CHLORIDE 20 MEQ
10 PACKET (EA) ORAL ONCE
Refills: 0 | Status: COMPLETED | OUTPATIENT
Start: 2020-01-01 | End: 2020-01-01

## 2020-01-01 RX ORDER — HALOPERIDOL DECANOATE 100 MG/ML
0.5 INJECTION INTRAMUSCULAR EVERY 8 HOURS
Refills: 0 | Status: DISCONTINUED | OUTPATIENT
Start: 2020-01-01 | End: 2020-01-01

## 2020-01-01 RX ORDER — NICARDIPINE HYDROCHLORIDE 30 MG/1
5 CAPSULE, EXTENDED RELEASE ORAL
Qty: 40 | Refills: 0 | Status: DISCONTINUED | OUTPATIENT
Start: 2020-01-01 | End: 2020-01-01

## 2020-01-01 RX ORDER — SODIUM CHLORIDE 9 MG/ML
1000 INJECTION, SOLUTION INTRAVENOUS
Refills: 0 | Status: DISCONTINUED | OUTPATIENT
Start: 2020-01-01 | End: 2020-01-01

## 2020-01-01 RX ORDER — IPRATROPIUM/ALBUTEROL SULFATE 18-103MCG
3 AEROSOL WITH ADAPTER (GRAM) INHALATION ONCE
Refills: 0 | Status: COMPLETED | OUTPATIENT
Start: 2020-01-01 | End: 2020-01-01

## 2020-01-01 RX ORDER — MORPHINE SULFATE 50 MG/1
0.5 CAPSULE, EXTENDED RELEASE ORAL EVERY 6 HOURS
Refills: 0 | Status: DISCONTINUED | OUTPATIENT
Start: 2020-01-01 | End: 2020-01-01

## 2020-01-01 RX ORDER — DIPHENHYDRAMINE HCL 50 MG
25 CAPSULE ORAL ONCE
Refills: 0 | Status: COMPLETED | OUTPATIENT
Start: 2020-01-01 | End: 2020-01-01

## 2020-01-01 RX ORDER — POTASSIUM CHLORIDE 20 MEQ
20 PACKET (EA) ORAL
Refills: 0 | Status: DISCONTINUED | OUTPATIENT
Start: 2020-01-01 | End: 2020-01-01

## 2020-01-01 RX ORDER — HYDRALAZINE HCL 50 MG
10 TABLET ORAL ONCE
Refills: 0 | Status: COMPLETED | OUTPATIENT
Start: 2020-01-01 | End: 2020-01-01

## 2020-01-01 RX ORDER — HALOPERIDOL DECANOATE 100 MG/ML
2 INJECTION INTRAMUSCULAR ONCE
Refills: 0 | Status: DISCONTINUED | OUTPATIENT
Start: 2020-01-01 | End: 2020-01-01

## 2020-01-01 RX ORDER — ERGOCALCIFEROL 1.25 MG/1
50000 CAPSULE ORAL
Refills: 0 | Status: DISCONTINUED | OUTPATIENT
Start: 2020-01-01 | End: 2020-01-01

## 2020-01-01 RX ORDER — LABETALOL HCL 100 MG
10 TABLET ORAL THREE TIMES A DAY
Refills: 0 | Status: DISCONTINUED | OUTPATIENT
Start: 2020-01-01 | End: 2020-01-01

## 2020-01-01 RX ORDER — FAMOTIDINE 10 MG/ML
20 INJECTION INTRAVENOUS ONCE
Refills: 0 | Status: COMPLETED | OUTPATIENT
Start: 2020-01-01 | End: 2020-01-01

## 2020-01-01 RX ORDER — FUROSEMIDE 40 MG
20 TABLET ORAL ONCE
Refills: 0 | Status: COMPLETED | OUTPATIENT
Start: 2020-01-01 | End: 2020-01-01

## 2020-01-01 RX ORDER — IPRATROPIUM/ALBUTEROL SULFATE 18-103MCG
3 AEROSOL WITH ADAPTER (GRAM) INHALATION EVERY 6 HOURS
Refills: 0 | Status: DISCONTINUED | OUTPATIENT
Start: 2020-01-01 | End: 2020-01-01

## 2020-01-01 RX ORDER — LEVOTHYROXINE SODIUM 125 MCG
125 TABLET ORAL DAILY
Refills: 0 | Status: DISCONTINUED | OUTPATIENT
Start: 2020-01-01 | End: 2020-01-01

## 2020-01-01 RX ORDER — OLANZAPINE 15 MG/1
5 TABLET, FILM COATED ORAL ONCE
Refills: 0 | Status: COMPLETED | OUTPATIENT
Start: 2020-01-01 | End: 2020-01-01

## 2020-01-01 RX ORDER — VALPROIC ACID (AS SODIUM SALT) 250 MG/5ML
1000 SOLUTION, ORAL ORAL ONCE
Refills: 0 | Status: COMPLETED | OUTPATIENT
Start: 2020-01-01 | End: 2020-01-01

## 2020-01-01 RX ORDER — MORPHINE SULFATE 50 MG/1
1 CAPSULE, EXTENDED RELEASE ORAL
Refills: 0 | Status: DISCONTINUED | OUTPATIENT
Start: 2020-01-01 | End: 2020-01-01

## 2020-01-01 RX ORDER — POTASSIUM CHLORIDE 20 MEQ
40 PACKET (EA) ORAL EVERY 4 HOURS
Refills: 0 | Status: DISCONTINUED | OUTPATIENT
Start: 2020-01-01 | End: 2020-01-01

## 2020-01-01 RX ORDER — SODIUM CHLORIDE 9 MG/ML
1000 INJECTION INTRAMUSCULAR; INTRAVENOUS; SUBCUTANEOUS
Refills: 0 | Status: DISCONTINUED | OUTPATIENT
Start: 2020-01-01 | End: 2020-01-01

## 2020-01-01 RX ORDER — ENOXAPARIN SODIUM 100 MG/ML
30 INJECTION SUBCUTANEOUS DAILY
Refills: 0 | Status: DISCONTINUED | OUTPATIENT
Start: 2020-01-01 | End: 2020-01-01

## 2020-01-01 RX ORDER — ATORVASTATIN CALCIUM 80 MG/1
80 TABLET, FILM COATED ORAL AT BEDTIME
Refills: 0 | Status: DISCONTINUED | OUTPATIENT
Start: 2020-01-01 | End: 2020-01-01

## 2020-01-01 RX ORDER — ASPIRIN/CALCIUM CARB/MAGNESIUM 324 MG
81 TABLET ORAL DAILY
Refills: 0 | Status: DISCONTINUED | OUTPATIENT
Start: 2020-01-01 | End: 2020-01-01

## 2020-01-01 RX ORDER — ACETAMINOPHEN 500 MG
650 TABLET ORAL EVERY 6 HOURS
Refills: 0 | Status: DISCONTINUED | OUTPATIENT
Start: 2020-01-01 | End: 2020-01-01

## 2020-01-01 RX ORDER — HALOPERIDOL DECANOATE 100 MG/ML
0.5 INJECTION INTRAMUSCULAR ONCE
Refills: 0 | Status: COMPLETED | OUTPATIENT
Start: 2020-01-01 | End: 2020-01-01

## 2020-01-01 RX ORDER — MAGNESIUM SULFATE 500 MG/ML
1 VIAL (ML) INJECTION ONCE
Refills: 0 | Status: COMPLETED | OUTPATIENT
Start: 2020-01-01 | End: 2020-01-01

## 2020-01-01 RX ORDER — LEVOTHYROXINE SODIUM 125 MCG
62.5 TABLET ORAL AT BEDTIME
Refills: 0 | Status: DISCONTINUED | OUTPATIENT
Start: 2020-01-01 | End: 2020-01-01

## 2020-01-01 RX ORDER — HALOPERIDOL DECANOATE 100 MG/ML
2 INJECTION INTRAMUSCULAR ONCE
Refills: 0 | Status: COMPLETED | OUTPATIENT
Start: 2020-01-01 | End: 2020-01-01

## 2020-01-01 RX ORDER — LEVALBUTEROL 1.25 MG/.5ML
0.63 SOLUTION, CONCENTRATE RESPIRATORY (INHALATION) EVERY 6 HOURS
Refills: 0 | Status: DISCONTINUED | OUTPATIENT
Start: 2020-01-01 | End: 2020-01-01

## 2020-01-01 RX ORDER — ROBINUL 0.2 MG/ML
0.4 INJECTION INTRAMUSCULAR; INTRAVENOUS EVERY 4 HOURS
Refills: 0 | Status: DISCONTINUED | OUTPATIENT
Start: 2020-01-01 | End: 2020-01-01

## 2020-01-01 RX ORDER — ENOXAPARIN SODIUM 100 MG/ML
40 INJECTION SUBCUTANEOUS DAILY
Refills: 0 | Status: DISCONTINUED | OUTPATIENT
Start: 2020-01-01 | End: 2020-01-01

## 2020-01-01 RX ORDER — HYDRALAZINE HCL 50 MG
10 TABLET ORAL EVERY 6 HOURS
Refills: 0 | Status: DISCONTINUED | OUTPATIENT
Start: 2020-01-01 | End: 2020-01-01

## 2020-01-01 RX ORDER — VALPROIC ACID (AS SODIUM SALT) 250 MG/5ML
500 SOLUTION, ORAL ORAL
Refills: 0 | Status: DISCONTINUED | OUTPATIENT
Start: 2020-01-01 | End: 2020-01-01

## 2020-01-01 RX ORDER — LABETALOL HCL 100 MG
20 TABLET ORAL EVERY 6 HOURS
Refills: 0 | Status: DISCONTINUED | OUTPATIENT
Start: 2020-01-01 | End: 2020-01-01

## 2020-01-01 RX ORDER — HALOPERIDOL DECANOATE 100 MG/ML
0.5 INJECTION INTRAMUSCULAR EVERY 6 HOURS
Refills: 0 | Status: DISCONTINUED | OUTPATIENT
Start: 2020-01-01 | End: 2020-01-01

## 2020-01-01 RX ORDER — ASPIRIN/CALCIUM CARB/MAGNESIUM 324 MG
300 TABLET ORAL DAILY
Refills: 0 | Status: DISCONTINUED | OUTPATIENT
Start: 2020-01-01 | End: 2020-01-01

## 2020-01-01 RX ORDER — ARIPIPRAZOLE 15 MG/1
2 TABLET ORAL DAILY
Refills: 0 | Status: DISCONTINUED | OUTPATIENT
Start: 2020-01-01 | End: 2020-01-01

## 2020-01-01 RX ORDER — FUROSEMIDE 40 MG
40 TABLET ORAL ONCE
Refills: 0 | Status: COMPLETED | OUTPATIENT
Start: 2020-01-01 | End: 2020-01-01

## 2020-01-01 RX ADMIN — Medication 0.5 INCH(S): at 16:25

## 2020-01-01 RX ADMIN — Medication 27.5 MILLIGRAM(S): at 17:45

## 2020-01-01 RX ADMIN — ATORVASTATIN CALCIUM 80 MILLIGRAM(S): 80 TABLET, FILM COATED ORAL at 23:20

## 2020-01-01 RX ADMIN — Medication 100 MILLIEQUIVALENT(S): at 06:36

## 2020-01-01 RX ADMIN — Medication 27.5 MILLIGRAM(S): at 06:31

## 2020-01-01 RX ADMIN — Medication 27.5 MILLIGRAM(S): at 19:18

## 2020-01-01 RX ADMIN — MORPHINE SULFATE 0.5 MILLIGRAM(S): 50 CAPSULE, EXTENDED RELEASE ORAL at 00:15

## 2020-01-01 RX ADMIN — Medication 1 PATCH: at 07:00

## 2020-01-01 RX ADMIN — ENOXAPARIN SODIUM 30 MILLIGRAM(S): 100 INJECTION SUBCUTANEOUS at 12:26

## 2020-01-01 RX ADMIN — Medication 100 MILLIEQUIVALENT(S): at 12:40

## 2020-01-01 RX ADMIN — Medication 300 MILLIGRAM(S): at 11:42

## 2020-01-01 RX ADMIN — Medication 27.5 MILLIGRAM(S): at 06:07

## 2020-01-01 RX ADMIN — ENOXAPARIN SODIUM 30 MILLIGRAM(S): 100 INJECTION SUBCUTANEOUS at 12:41

## 2020-01-01 RX ADMIN — Medication 110 MILLIGRAM(S): at 05:01

## 2020-01-01 RX ADMIN — HALOPERIDOL DECANOATE 0.5 MILLIGRAM(S): 100 INJECTION INTRAMUSCULAR at 16:16

## 2020-01-01 RX ADMIN — Medication 1 PATCH: at 07:30

## 2020-01-01 RX ADMIN — Medication 10 MILLIGRAM(S): at 17:04

## 2020-01-01 RX ADMIN — Medication 0.5 INCH(S): at 12:37

## 2020-01-01 RX ADMIN — Medication 10 MILLIGRAM(S): at 12:30

## 2020-01-01 RX ADMIN — ROBINUL 0.4 MILLIGRAM(S): 0.2 INJECTION INTRAMUSCULAR; INTRAVENOUS at 10:08

## 2020-01-01 RX ADMIN — Medication 20 MILLIGRAM(S): at 00:03

## 2020-01-01 RX ADMIN — Medication 10 MILLIGRAM(S): at 20:20

## 2020-01-01 RX ADMIN — Medication 27.5 MILLIGRAM(S): at 18:11

## 2020-01-01 RX ADMIN — ARIPIPRAZOLE 2 MILLIGRAM(S): 15 TABLET ORAL at 11:02

## 2020-01-01 RX ADMIN — Medication 27.5 MILLIGRAM(S): at 05:23

## 2020-01-01 RX ADMIN — Medication 27.5 MILLIGRAM(S): at 05:01

## 2020-01-01 RX ADMIN — Medication 10 MILLIGRAM(S): at 23:24

## 2020-01-01 RX ADMIN — Medication 0.5 INCH(S): at 23:21

## 2020-01-01 RX ADMIN — Medication 300 MILLIGRAM(S): at 11:45

## 2020-01-01 RX ADMIN — Medication 10 MILLIGRAM(S): at 05:35

## 2020-01-01 RX ADMIN — Medication 10 MILLIGRAM(S): at 08:18

## 2020-01-01 RX ADMIN — Medication 27.5 MILLIGRAM(S): at 18:02

## 2020-01-01 RX ADMIN — Medication 10 MILLIGRAM(S): at 17:42

## 2020-01-01 RX ADMIN — Medication 27.5 MILLIGRAM(S): at 05:33

## 2020-01-01 RX ADMIN — HALOPERIDOL DECANOATE 1 MILLIGRAM(S): 100 INJECTION INTRAMUSCULAR at 14:40

## 2020-01-01 RX ADMIN — ENOXAPARIN SODIUM 30 MILLIGRAM(S): 100 INJECTION SUBCUTANEOUS at 14:49

## 2020-01-01 RX ADMIN — MORPHINE SULFATE 0.5 MILLIGRAM(S): 50 CAPSULE, EXTENDED RELEASE ORAL at 17:33

## 2020-01-01 RX ADMIN — Medication 10 MILLIGRAM(S): at 03:09

## 2020-01-01 RX ADMIN — Medication 25 MILLIGRAM(S): at 00:17

## 2020-01-01 RX ADMIN — Medication 10 MILLIGRAM(S): at 03:30

## 2020-01-01 RX ADMIN — Medication 1 PATCH: at 12:16

## 2020-01-01 RX ADMIN — MORPHINE SULFATE 0.5 MILLIGRAM(S): 50 CAPSULE, EXTENDED RELEASE ORAL at 13:00

## 2020-01-01 RX ADMIN — Medication 0.5 INCH(S): at 12:40

## 2020-01-01 RX ADMIN — Medication 0.5 INCH(S): at 07:08

## 2020-01-01 RX ADMIN — Medication 0.5 INCH(S): at 04:00

## 2020-01-01 RX ADMIN — MORPHINE SULFATE 0.5 MILLIGRAM(S): 50 CAPSULE, EXTENDED RELEASE ORAL at 12:41

## 2020-01-01 RX ADMIN — Medication 0.5 INCH(S): at 11:54

## 2020-01-01 RX ADMIN — Medication 110 MILLIGRAM(S): at 05:40

## 2020-01-01 RX ADMIN — Medication 27.5 MILLIGRAM(S): at 05:45

## 2020-01-01 RX ADMIN — Medication 10 MILLIGRAM(S): at 17:38

## 2020-01-01 RX ADMIN — Medication 27.5 MILLIGRAM(S): at 13:00

## 2020-01-01 RX ADMIN — Medication 1 PATCH: at 20:04

## 2020-01-01 RX ADMIN — Medication 62.5 MICROGRAM(S): at 22:07

## 2020-01-01 RX ADMIN — NICARDIPINE HYDROCHLORIDE 25 MG/HR: 30 CAPSULE, EXTENDED RELEASE ORAL at 11:07

## 2020-01-01 RX ADMIN — Medication 10 MILLIGRAM(S): at 13:39

## 2020-01-01 RX ADMIN — Medication 62.5 MICROGRAM(S): at 22:00

## 2020-01-01 RX ADMIN — Medication 10 MILLIGRAM(S): at 05:57

## 2020-01-01 RX ADMIN — NICARDIPINE HYDROCHLORIDE 25 MG/HR: 30 CAPSULE, EXTENDED RELEASE ORAL at 10:12

## 2020-01-01 RX ADMIN — ENOXAPARIN SODIUM 30 MILLIGRAM(S): 100 INJECTION SUBCUTANEOUS at 11:26

## 2020-01-01 RX ADMIN — Medication 100 MILLIEQUIVALENT(S): at 21:37

## 2020-01-01 RX ADMIN — Medication 10 MILLIGRAM(S): at 05:33

## 2020-01-01 RX ADMIN — Medication 100 MILLIEQUIVALENT(S): at 17:47

## 2020-01-01 RX ADMIN — MORPHINE SULFATE 0.5 MILLIGRAM(S): 50 CAPSULE, EXTENDED RELEASE ORAL at 06:16

## 2020-01-01 RX ADMIN — Medication 1 PATCH: at 12:30

## 2020-01-01 RX ADMIN — ROBINUL 0.4 MILLIGRAM(S): 0.2 INJECTION INTRAMUSCULAR; INTRAVENOUS at 13:06

## 2020-01-01 RX ADMIN — ENOXAPARIN SODIUM 30 MILLIGRAM(S): 100 INJECTION SUBCUTANEOUS at 11:45

## 2020-01-01 RX ADMIN — Medication 27.5 MILLIGRAM(S): at 18:36

## 2020-01-01 RX ADMIN — Medication 10 MILLIGRAM(S): at 00:40

## 2020-01-01 RX ADMIN — Medication 62.5 MICROGRAM(S): at 21:37

## 2020-01-01 RX ADMIN — Medication 27.5 MILLIGRAM(S): at 06:08

## 2020-01-01 RX ADMIN — Medication 27.5 MILLIGRAM(S): at 05:00

## 2020-01-01 RX ADMIN — Medication 27.5 MILLIGRAM(S): at 05:16

## 2020-01-01 RX ADMIN — ROBINUL 0.4 MILLIGRAM(S): 0.2 INJECTION INTRAMUSCULAR; INTRAVENOUS at 05:58

## 2020-01-01 RX ADMIN — Medication 10 MILLIGRAM(S): at 23:19

## 2020-01-01 RX ADMIN — Medication 27.5 MILLIGRAM(S): at 19:35

## 2020-01-01 RX ADMIN — Medication 0.5 INCH(S): at 06:29

## 2020-01-01 RX ADMIN — Medication 62.5 MICROGRAM(S): at 22:40

## 2020-01-01 RX ADMIN — NICARDIPINE HYDROCHLORIDE 25 MG/HR: 30 CAPSULE, EXTENDED RELEASE ORAL at 00:47

## 2020-01-01 RX ADMIN — Medication 62.5 MICROGRAM(S): at 00:55

## 2020-01-01 RX ADMIN — Medication 10 MILLIGRAM(S): at 05:23

## 2020-01-01 RX ADMIN — Medication 0.5 INCH(S): at 00:09

## 2020-01-01 RX ADMIN — Medication 1 PATCH: at 19:42

## 2020-01-01 RX ADMIN — Medication 62.5 MICROGRAM(S): at 22:29

## 2020-01-01 RX ADMIN — NICARDIPINE HYDROCHLORIDE 25 MG/HR: 30 CAPSULE, EXTENDED RELEASE ORAL at 21:12

## 2020-01-01 RX ADMIN — Medication 10 MILLIGRAM(S): at 12:00

## 2020-01-01 RX ADMIN — Medication 10 MILLIGRAM(S): at 00:42

## 2020-01-01 RX ADMIN — Medication 110 MILLIGRAM(S): at 05:46

## 2020-01-01 RX ADMIN — MORPHINE SULFATE 0.5 MILLIGRAM(S): 50 CAPSULE, EXTENDED RELEASE ORAL at 12:23

## 2020-01-01 RX ADMIN — Medication 0.5 MILLIGRAM(S): at 01:38

## 2020-01-01 RX ADMIN — SODIUM CHLORIDE 60 MILLILITER(S): 9 INJECTION, SOLUTION INTRAVENOUS at 08:21

## 2020-01-01 RX ADMIN — Medication 27.5 MILLIGRAM(S): at 00:06

## 2020-01-01 RX ADMIN — MORPHINE SULFATE 0.5 MILLIGRAM(S): 50 CAPSULE, EXTENDED RELEASE ORAL at 05:19

## 2020-01-01 RX ADMIN — Medication 100 GRAM(S): at 17:30

## 2020-01-01 RX ADMIN — Medication 62.5 MICROGRAM(S): at 23:20

## 2020-01-01 RX ADMIN — Medication 10 MILLIGRAM(S): at 12:09

## 2020-01-01 RX ADMIN — MORPHINE SULFATE 0.5 MILLIGRAM(S): 50 CAPSULE, EXTENDED RELEASE ORAL at 00:38

## 2020-01-01 RX ADMIN — Medication 110 MILLIGRAM(S): at 18:50

## 2020-01-01 RX ADMIN — Medication 110 MILLIGRAM(S): at 05:19

## 2020-01-01 RX ADMIN — Medication 110 MILLIGRAM(S): at 17:21

## 2020-01-01 RX ADMIN — Medication 10 MILLIGRAM(S): at 17:09

## 2020-01-01 RX ADMIN — HALOPERIDOL DECANOATE 0.5 MILLIGRAM(S): 100 INJECTION INTRAMUSCULAR at 08:48

## 2020-01-01 RX ADMIN — HALOPERIDOL DECANOATE 2 MILLIGRAM(S): 100 INJECTION INTRAMUSCULAR at 17:44

## 2020-01-01 RX ADMIN — Medication 0.5 INCH(S): at 03:23

## 2020-01-01 RX ADMIN — Medication 650 MILLIGRAM(S): at 14:30

## 2020-01-01 RX ADMIN — Medication 0.5 INCH(S): at 16:33

## 2020-01-01 RX ADMIN — Medication 27.5 MILLIGRAM(S): at 18:16

## 2020-01-01 RX ADMIN — Medication 300 MILLIGRAM(S): at 11:02

## 2020-01-01 RX ADMIN — Medication 27.5 MILLIGRAM(S): at 23:25

## 2020-01-01 RX ADMIN — MORPHINE SULFATE 0.5 MILLIGRAM(S): 50 CAPSULE, EXTENDED RELEASE ORAL at 00:10

## 2020-01-01 RX ADMIN — MORPHINE SULFATE 0.5 MILLIGRAM(S): 50 CAPSULE, EXTENDED RELEASE ORAL at 23:49

## 2020-01-01 RX ADMIN — Medication 27.5 MILLIGRAM(S): at 18:09

## 2020-01-01 RX ADMIN — Medication 300 MILLIGRAM(S): at 12:26

## 2020-01-01 RX ADMIN — Medication 110 MILLIGRAM(S): at 17:18

## 2020-01-01 RX ADMIN — Medication 10 MILLIGRAM(S): at 05:32

## 2020-01-01 RX ADMIN — Medication 0.5 MILLIGRAM(S): at 16:49

## 2020-01-01 RX ADMIN — Medication 1 PATCH: at 18:28

## 2020-01-01 RX ADMIN — Medication 100 MILLIEQUIVALENT(S): at 12:30

## 2020-01-01 RX ADMIN — Medication 1 PATCH: at 19:45

## 2020-01-01 RX ADMIN — LEVALBUTEROL 0.63 MILLIGRAM(S): 1.25 SOLUTION, CONCENTRATE RESPIRATORY (INHALATION) at 03:02

## 2020-01-01 RX ADMIN — Medication 0.5 INCH(S): at 14:00

## 2020-01-01 RX ADMIN — MORPHINE SULFATE 0.5 MILLIGRAM(S): 50 CAPSULE, EXTENDED RELEASE ORAL at 23:23

## 2020-01-01 RX ADMIN — Medication 300 MILLIGRAM(S): at 12:09

## 2020-01-01 RX ADMIN — Medication 62.5 MICROGRAM(S): at 23:25

## 2020-01-01 RX ADMIN — Medication 10 MILLIGRAM(S): at 23:21

## 2020-01-01 RX ADMIN — Medication 27.5 MILLIGRAM(S): at 12:41

## 2020-01-01 RX ADMIN — Medication 27.5 MILLIGRAM(S): at 12:23

## 2020-01-01 RX ADMIN — Medication 0.5 MILLIGRAM(S): at 15:51

## 2020-01-01 RX ADMIN — Medication 300 MILLIGRAM(S): at 12:46

## 2020-01-01 RX ADMIN — MORPHINE SULFATE 0.5 MILLIGRAM(S): 50 CAPSULE, EXTENDED RELEASE ORAL at 10:37

## 2020-01-01 RX ADMIN — LEVALBUTEROL 0.63 MILLIGRAM(S): 1.25 SOLUTION, CONCENTRATE RESPIRATORY (INHALATION) at 03:20

## 2020-01-01 RX ADMIN — HALOPERIDOL DECANOATE 0.5 MILLIGRAM(S): 100 INJECTION INTRAMUSCULAR at 07:49

## 2020-01-01 RX ADMIN — Medication 27.5 MILLIGRAM(S): at 17:08

## 2020-01-01 RX ADMIN — Medication 27.5 MILLIGRAM(S): at 17:19

## 2020-01-01 RX ADMIN — MORPHINE SULFATE 0.5 MILLIGRAM(S): 50 CAPSULE, EXTENDED RELEASE ORAL at 11:43

## 2020-01-01 RX ADMIN — Medication 27.5 MILLIGRAM(S): at 21:10

## 2020-01-01 RX ADMIN — Medication 3 MILLILITER(S): at 16:17

## 2020-01-01 RX ADMIN — MORPHINE SULFATE 0.5 MILLIGRAM(S): 50 CAPSULE, EXTENDED RELEASE ORAL at 18:35

## 2020-01-01 RX ADMIN — Medication 20 MILLIGRAM(S): at 18:11

## 2020-01-01 RX ADMIN — Medication 10 MILLIGRAM(S): at 23:16

## 2020-01-01 RX ADMIN — Medication 10 MILLIGRAM(S): at 10:08

## 2020-01-01 RX ADMIN — Medication 27.5 MILLIGRAM(S): at 17:20

## 2020-01-01 RX ADMIN — Medication 110 MILLIGRAM(S): at 17:20

## 2020-01-01 RX ADMIN — MORPHINE SULFATE 0.5 MILLIGRAM(S): 50 CAPSULE, EXTENDED RELEASE ORAL at 11:50

## 2020-01-01 RX ADMIN — Medication 1 PATCH: at 07:22

## 2020-01-01 RX ADMIN — FAMOTIDINE 20 MILLIGRAM(S): 10 INJECTION INTRAVENOUS at 17:00

## 2020-01-01 RX ADMIN — Medication 300 MILLIGRAM(S): at 12:57

## 2020-01-01 RX ADMIN — Medication 1 PATCH: at 10:11

## 2020-01-01 RX ADMIN — Medication 10 MILLIGRAM(S): at 11:54

## 2020-01-01 RX ADMIN — HALOPERIDOL DECANOATE 2 MILLIGRAM(S): 100 INJECTION INTRAMUSCULAR at 03:44

## 2020-01-01 RX ADMIN — Medication 1 PATCH: at 08:43

## 2020-01-01 RX ADMIN — Medication 0.5 INCH(S): at 18:48

## 2020-01-01 RX ADMIN — Medication 40 MILLIGRAM(S): at 16:54

## 2020-01-01 RX ADMIN — Medication 10 MILLIGRAM(S): at 14:49

## 2020-01-01 RX ADMIN — Medication 10 MILLIGRAM(S): at 18:10

## 2020-01-01 RX ADMIN — Medication 62.5 MICROGRAM(S): at 23:21

## 2020-01-01 RX ADMIN — Medication 100 MILLIEQUIVALENT(S): at 14:49

## 2020-01-01 RX ADMIN — Medication 25 MILLIGRAM(S): at 14:40

## 2020-01-01 RX ADMIN — Medication 62.5 MICROGRAM(S): at 22:14

## 2020-01-01 RX ADMIN — Medication 27.5 MILLIGRAM(S): at 12:02

## 2020-01-01 RX ADMIN — Medication 62.5 MICROGRAM(S): at 00:13

## 2020-01-01 RX ADMIN — Medication 62.5 MICROGRAM(S): at 21:31

## 2020-01-01 RX ADMIN — MORPHINE SULFATE 1 MILLIGRAM(S): 50 CAPSULE, EXTENDED RELEASE ORAL at 13:38

## 2020-01-01 RX ADMIN — OLANZAPINE 5 MILLIGRAM(S): 15 TABLET, FILM COATED ORAL at 12:08

## 2020-01-01 RX ADMIN — MORPHINE SULFATE 1 MILLIGRAM(S): 50 CAPSULE, EXTENDED RELEASE ORAL at 03:43

## 2020-01-01 RX ADMIN — Medication 1 PATCH: at 08:42

## 2020-01-01 RX ADMIN — OLANZAPINE 5 MILLIGRAM(S): 15 TABLET, FILM COATED ORAL at 06:17

## 2020-01-01 RX ADMIN — ENOXAPARIN SODIUM 30 MILLIGRAM(S): 100 INJECTION SUBCUTANEOUS at 12:56

## 2020-01-01 RX ADMIN — Medication 62.5 MICROGRAM(S): at 21:25

## 2020-01-01 RX ADMIN — Medication 1 PATCH: at 12:29

## 2020-01-01 RX ADMIN — MORPHINE SULFATE 0.5 MILLIGRAM(S): 50 CAPSULE, EXTENDED RELEASE ORAL at 17:14

## 2020-01-01 RX ADMIN — Medication 0.5 MILLIGRAM(S): at 12:59

## 2020-01-01 RX ADMIN — MORPHINE SULFATE 0.5 MILLIGRAM(S): 50 CAPSULE, EXTENDED RELEASE ORAL at 12:03

## 2020-01-01 RX ADMIN — SODIUM CHLORIDE 75 MILLILITER(S): 9 INJECTION, SOLUTION INTRAVENOUS at 20:00

## 2020-01-01 RX ADMIN — Medication 1 PATCH: at 17:13

## 2020-01-01 RX ADMIN — SODIUM CHLORIDE 50 MILLILITER(S): 9 INJECTION, SOLUTION INTRAVENOUS at 13:22

## 2020-01-01 RX ADMIN — Medication 27.5 MILLIGRAM(S): at 17:32

## 2020-01-01 RX ADMIN — Medication 27.5 MILLIGRAM(S): at 00:11

## 2020-01-01 RX ADMIN — Medication 10 MILLIGRAM(S): at 23:04

## 2020-01-01 RX ADMIN — Medication 62.5 MICROGRAM(S): at 22:48

## 2020-01-01 RX ADMIN — Medication 0.5 MILLIGRAM(S): at 20:32

## 2020-01-01 RX ADMIN — ENOXAPARIN SODIUM 30 MILLIGRAM(S): 100 INJECTION SUBCUTANEOUS at 12:08

## 2020-01-01 RX ADMIN — Medication 27.5 MILLIGRAM(S): at 17:03

## 2020-01-01 RX ADMIN — Medication 10 MILLIGRAM(S): at 11:47

## 2020-01-01 RX ADMIN — ENOXAPARIN SODIUM 30 MILLIGRAM(S): 100 INJECTION SUBCUTANEOUS at 11:47

## 2020-01-01 RX ADMIN — Medication 1 PATCH: at 07:41

## 2020-01-01 RX ADMIN — MORPHINE SULFATE 0.5 MILLIGRAM(S): 50 CAPSULE, EXTENDED RELEASE ORAL at 17:24

## 2020-01-01 RX ADMIN — Medication 1000 MILLIGRAM(S): at 08:47

## 2020-01-01 RX ADMIN — Medication 0.5 INCH(S): at 23:55

## 2020-01-01 RX ADMIN — Medication 400 MILLIGRAM(S): at 08:23

## 2020-01-01 RX ADMIN — Medication 300 MILLIGRAM(S): at 12:43

## 2020-01-01 RX ADMIN — Medication 0.5 INCH(S): at 01:00

## 2020-01-01 RX ADMIN — HALOPERIDOL DECANOATE 1 MILLIGRAM(S): 100 INJECTION INTRAMUSCULAR at 17:55

## 2020-01-01 RX ADMIN — Medication 0.5 INCH(S): at 23:23

## 2020-01-01 RX ADMIN — Medication 27.5 MILLIGRAM(S): at 06:21

## 2020-01-01 RX ADMIN — Medication 27.5 MILLIGRAM(S): at 17:37

## 2020-01-01 RX ADMIN — Medication 100 MILLIEQUIVALENT(S): at 17:30

## 2020-01-01 RX ADMIN — SODIUM CHLORIDE 70 MILLILITER(S): 9 INJECTION, SOLUTION INTRAVENOUS at 18:23

## 2020-01-01 RX ADMIN — Medication 62.5 MICROGRAM(S): at 21:28

## 2020-01-01 RX ADMIN — Medication 300 MILLIGRAM(S): at 12:31

## 2020-01-01 RX ADMIN — Medication 1 PATCH: at 18:23

## 2020-01-01 RX ADMIN — MORPHINE SULFATE 0.5 MILLIGRAM(S): 50 CAPSULE, EXTENDED RELEASE ORAL at 17:18

## 2020-01-01 RX ADMIN — Medication 27.5 MILLIGRAM(S): at 13:14

## 2020-01-01 RX ADMIN — Medication 10 MILLIGRAM(S): at 16:00

## 2020-01-01 RX ADMIN — Medication 650 MILLIGRAM(S): at 12:30

## 2020-01-01 RX ADMIN — Medication 0.5 MILLIGRAM(S): at 11:58

## 2020-01-01 RX ADMIN — Medication 27.5 MILLIGRAM(S): at 11:42

## 2020-01-01 RX ADMIN — Medication 110 MILLIGRAM(S): at 06:12

## 2020-01-01 RX ADMIN — ENOXAPARIN SODIUM 30 MILLIGRAM(S): 100 INJECTION SUBCUTANEOUS at 11:01

## 2020-01-01 RX ADMIN — Medication 62.5 MICROGRAM(S): at 22:12

## 2020-01-01 RX ADMIN — HALOPERIDOL DECANOATE 0.5 MILLIGRAM(S): 100 INJECTION INTRAMUSCULAR at 14:24

## 2020-01-01 RX ADMIN — Medication 125 MILLIGRAM(S): at 17:30

## 2020-01-01 RX ADMIN — Medication 0.5 INCH(S): at 12:00

## 2020-01-01 RX ADMIN — HALOPERIDOL DECANOATE 0.5 MILLIGRAM(S): 100 INJECTION INTRAMUSCULAR at 22:06

## 2020-01-01 RX ADMIN — Medication 27.5 MILLIGRAM(S): at 05:02

## 2020-01-01 RX ADMIN — Medication 10 MILLIGRAM(S): at 17:45

## 2020-01-01 RX ADMIN — ENOXAPARIN SODIUM 30 MILLIGRAM(S): 100 INJECTION SUBCUTANEOUS at 12:46

## 2020-01-01 RX ADMIN — ENOXAPARIN SODIUM 30 MILLIGRAM(S): 100 INJECTION SUBCUTANEOUS at 11:43

## 2020-01-01 RX ADMIN — Medication 300 MILLIGRAM(S): at 14:40

## 2020-01-01 RX ADMIN — Medication 400 MILLIGRAM(S): at 16:36

## 2020-01-01 RX ADMIN — MORPHINE SULFATE 0.5 MILLIGRAM(S): 50 CAPSULE, EXTENDED RELEASE ORAL at 05:05

## 2020-01-01 RX ADMIN — SODIUM CHLORIDE 70 MILLILITER(S): 9 INJECTION, SOLUTION INTRAVENOUS at 05:02

## 2020-01-01 RX ADMIN — Medication 100 MILLIEQUIVALENT(S): at 18:33

## 2020-01-01 RX ADMIN — Medication 0.5 INCH(S): at 15:34

## 2020-01-01 RX ADMIN — Medication 25 MILLIGRAM(S): at 21:45

## 2020-01-01 RX ADMIN — MORPHINE SULFATE 0.5 MILLIGRAM(S): 50 CAPSULE, EXTENDED RELEASE ORAL at 23:25

## 2020-01-01 RX ADMIN — Medication 1000 MILLIGRAM(S): at 15:50

## 2020-01-01 RX ADMIN — MORPHINE SULFATE 0.5 MILLIGRAM(S): 50 CAPSULE, EXTENDED RELEASE ORAL at 08:38

## 2020-01-01 RX ADMIN — Medication 27.5 MILLIGRAM(S): at 00:14

## 2020-01-01 RX ADMIN — Medication 27.5 MILLIGRAM(S): at 23:49

## 2020-01-01 RX ADMIN — Medication 110 MILLIGRAM(S): at 05:04

## 2020-01-01 RX ADMIN — Medication 0.5 MILLIGRAM(S): at 23:58

## 2020-01-01 RX ADMIN — Medication 27.5 MILLIGRAM(S): at 00:38

## 2020-01-01 RX ADMIN — Medication 20 MILLIGRAM(S): at 20:50

## 2020-01-01 RX ADMIN — Medication 300 MILLIGRAM(S): at 14:24

## 2020-01-01 RX ADMIN — HALOPERIDOL DECANOATE 0.5 MILLIGRAM(S): 100 INJECTION INTRAMUSCULAR at 23:20

## 2020-01-01 RX ADMIN — Medication 10 MILLIGRAM(S): at 00:20

## 2020-01-01 RX ADMIN — Medication 110 MILLIGRAM(S): at 18:34

## 2020-01-01 RX ADMIN — Medication 10 MILLIGRAM(S): at 05:58

## 2020-01-01 RX ADMIN — Medication 62.5 MICROGRAM(S): at 23:59

## 2020-01-01 RX ADMIN — Medication 0.5 INCH(S): at 13:59

## 2020-01-01 RX ADMIN — Medication 20 MILLIGRAM(S): at 12:16

## 2020-01-01 RX ADMIN — Medication 100 MILLIEQUIVALENT(S): at 11:47

## 2020-01-01 RX ADMIN — HALOPERIDOL DECANOATE 1 MILLIGRAM(S): 100 INJECTION INTRAMUSCULAR at 11:02

## 2020-01-01 RX ADMIN — Medication 62.5 MICROGRAM(S): at 22:23

## 2020-01-01 RX ADMIN — Medication 62.5 MICROGRAM(S): at 21:21

## 2020-01-01 RX ADMIN — Medication 10 MILLIGRAM(S): at 12:35

## 2020-01-01 RX ADMIN — Medication 10 MILLIGRAM(S): at 17:18

## 2020-01-01 RX ADMIN — Medication 20 MILLIGRAM(S): at 03:22

## 2020-01-01 RX ADMIN — Medication 100 MILLIEQUIVALENT(S): at 13:20

## 2020-01-01 RX ADMIN — MORPHINE SULFATE 1 MILLIGRAM(S): 50 CAPSULE, EXTENDED RELEASE ORAL at 07:42

## 2020-01-01 RX ADMIN — MORPHINE SULFATE 0.5 MILLIGRAM(S): 50 CAPSULE, EXTENDED RELEASE ORAL at 12:10

## 2020-01-01 RX ADMIN — Medication 110 MILLIGRAM(S): at 17:12

## 2020-01-01 RX ADMIN — SODIUM CHLORIDE 50 MILLILITER(S): 9 INJECTION INTRAMUSCULAR; INTRAVENOUS; SUBCUTANEOUS at 11:29

## 2020-01-01 RX ADMIN — Medication 27.5 MILLIGRAM(S): at 05:35

## 2020-01-01 RX ADMIN — Medication 27.5 MILLIGRAM(S): at 18:48

## 2020-01-01 RX ADMIN — Medication 1 PATCH: at 12:49

## 2020-01-01 RX ADMIN — Medication 62.5 MICROGRAM(S): at 21:57

## 2020-01-01 RX ADMIN — Medication 0.5 INCH(S): at 13:44

## 2020-01-01 RX ADMIN — ENOXAPARIN SODIUM 30 MILLIGRAM(S): 100 INJECTION SUBCUTANEOUS at 14:29

## 2020-01-01 RX ADMIN — ENOXAPARIN SODIUM 30 MILLIGRAM(S): 100 INJECTION SUBCUTANEOUS at 12:30

## 2020-01-01 RX ADMIN — HALOPERIDOL DECANOATE 0.5 MILLIGRAM(S): 100 INJECTION INTRAMUSCULAR at 12:00

## 2020-01-01 RX ADMIN — MORPHINE SULFATE 0.5 MILLIGRAM(S): 50 CAPSULE, EXTENDED RELEASE ORAL at 05:46

## 2020-01-01 RX ADMIN — Medication 10 MILLIGRAM(S): at 11:58

## 2020-01-01 RX ADMIN — Medication 27.5 MILLIGRAM(S): at 17:39

## 2020-01-01 RX ADMIN — Medication 27.5 MILLIGRAM(S): at 05:13

## 2020-01-01 RX ADMIN — Medication 27.5 MILLIGRAM(S): at 19:04

## 2020-01-01 RX ADMIN — Medication 10 MILLIGRAM(S): at 20:32

## 2020-01-01 RX ADMIN — Medication 62.5 MICROGRAM(S): at 22:13

## 2020-01-01 RX ADMIN — Medication 300 MILLIGRAM(S): at 11:47

## 2020-01-01 RX ADMIN — Medication 27.5 MILLIGRAM(S): at 05:04

## 2020-01-01 RX ADMIN — Medication 400 MILLIGRAM(S): at 15:20

## 2020-01-01 RX ADMIN — HALOPERIDOL DECANOATE 0.5 MILLIGRAM(S): 100 INJECTION INTRAMUSCULAR at 05:32

## 2020-01-01 RX ADMIN — Medication 0.5 MILLIGRAM(S): at 08:34

## 2020-01-01 RX ADMIN — MORPHINE SULFATE 0.5 MILLIGRAM(S): 50 CAPSULE, EXTENDED RELEASE ORAL at 17:20

## 2020-01-01 RX ADMIN — Medication 27.5 MILLIGRAM(S): at 05:58

## 2020-01-01 RX ADMIN — Medication 10 MILLIGRAM(S): at 18:51

## 2020-01-01 RX ADMIN — Medication 100 MILLIEQUIVALENT(S): at 21:43

## 2020-01-01 RX ADMIN — Medication 10 MILLIGRAM(S): at 12:41

## 2020-01-01 RX ADMIN — Medication 62.5 MICROGRAM(S): at 21:32

## 2020-01-01 RX ADMIN — SODIUM CHLORIDE 40 MILLILITER(S): 9 INJECTION INTRAMUSCULAR; INTRAVENOUS; SUBCUTANEOUS at 18:34

## 2020-01-01 RX ADMIN — Medication 27.5 MILLIGRAM(S): at 05:37

## 2020-01-01 RX ADMIN — Medication 62.5 MICROGRAM(S): at 21:46

## 2020-01-01 RX ADMIN — MORPHINE SULFATE 0.5 MILLIGRAM(S): 50 CAPSULE, EXTENDED RELEASE ORAL at 18:50

## 2020-01-01 RX ADMIN — Medication 100 MILLIEQUIVALENT(S): at 10:55

## 2020-01-01 RX ADMIN — Medication 1 MILLIGRAM(S): at 00:21

## 2020-01-01 RX ADMIN — Medication 27.5 MILLIGRAM(S): at 17:06

## 2020-01-01 RX ADMIN — Medication 10 MILLIGRAM(S): at 12:28

## 2020-01-01 RX ADMIN — MORPHINE SULFATE 0.5 MILLIGRAM(S): 50 CAPSULE, EXTENDED RELEASE ORAL at 05:06

## 2020-01-01 RX ADMIN — Medication 0.5 INCH(S): at 00:01

## 2020-01-01 RX ADMIN — Medication 1 PATCH: at 19:34

## 2020-01-01 RX ADMIN — Medication 10 MILLIGRAM(S): at 04:19

## 2020-01-01 RX ADMIN — Medication 27.5 MILLIGRAM(S): at 05:47

## 2020-01-01 RX ADMIN — MORPHINE SULFATE 0.5 MILLIGRAM(S): 50 CAPSULE, EXTENDED RELEASE ORAL at 06:12

## 2020-01-01 RX ADMIN — Medication 1 PATCH: at 00:11

## 2020-01-01 RX ADMIN — Medication 1000 MILLIGRAM(S): at 17:20

## 2020-01-01 RX ADMIN — MORPHINE SULFATE 0.5 MILLIGRAM(S): 50 CAPSULE, EXTENDED RELEASE ORAL at 05:24

## 2020-01-01 RX ADMIN — Medication 10 MILLIGRAM(S): at 11:53

## 2020-01-01 RX ADMIN — HALOPERIDOL DECANOATE 0.5 MILLIGRAM(S): 100 INJECTION INTRAMUSCULAR at 05:35

## 2020-01-01 RX ADMIN — SODIUM CHLORIDE 70 MILLILITER(S): 9 INJECTION, SOLUTION INTRAVENOUS at 21:37

## 2020-01-01 RX ADMIN — ROBINUL 0.4 MILLIGRAM(S): 0.2 INJECTION INTRAMUSCULAR; INTRAVENOUS at 00:09

## 2020-01-01 RX ADMIN — Medication 10 MILLIGRAM(S): at 00:09

## 2020-01-01 RX ADMIN — Medication 62.5 MICROGRAM(S): at 21:41

## 2020-01-01 RX ADMIN — Medication 300 MILLIGRAM(S): at 11:30

## 2020-01-01 RX ADMIN — Medication 0.5 MILLIGRAM(S): at 17:38

## 2020-01-01 RX ADMIN — Medication 62.5 MICROGRAM(S): at 21:40

## 2020-01-23 NOTE — PHYSICAL THERAPY INITIAL EVALUATION ADULT - IMPAIRMENTS CONTRIBUTING IMPAIRED BED MOBILITY, REHAB EVAL
impaired postural control/impaired balance/decreased ROM/decreased flexibility/cognition/decreased strength

## 2020-01-23 NOTE — OCCUPATIONAL THERAPY INITIAL EVALUATION ADULT - IMPAIRMENTS CONTRIBUTING IMPAIRED BED MOBILITY, REHAB EVAL
impaired postural control/cognition/impaired motor control/impaired coordination/decreased flexibility/impaired balance/decreased strength/decreased ROM

## 2020-01-23 NOTE — ED PROVIDER NOTE - PROGRESS NOTE DETAILS
dr stone at bedside knows pt - ho htn - no anticoagulants - dw son and son in law who is neuro sx, pt now DNR, no TPA as pt last known nml is uncertain

## 2020-01-23 NOTE — OCCUPATIONAL THERAPY INITIAL EVALUATION ADULT - LIVES WITH, PROFILE
Pt lives with family in a pvt home, 3 [address] and no steps inside. Pt has a walk in shower, shower chair, and grab bars.  Required S for all ADLs and functional mobility PTA.

## 2020-01-23 NOTE — OCCUPATIONAL THERAPY INITIAL EVALUATION ADULT - BALANCE DISTURBANCE, IDENTIFIED IMPAIRMENT CONTRIBUTE, REHAB EVAL
impaired coordination/impaired motor control/impaired postural control/decreased strength/decreased ROM

## 2020-01-23 NOTE — CHART NOTE - NSCHARTNOTEFT_GEN_A_CORE
HPI: 89F with PMHx of HTN, dementia, DM, prior stroke in 2018 (L parietal - residual mixed receptive/expressive aphasia) who presented to the ED with AMS. BRENTN determined to be 5:00 PM on 2020. At that time she was seen ambulating at her baseline and speaking, and then acutely became weaker (was not even able to stand up from couch) and became confused, asking odd questions and was disoriented. The morning of presentation she was also noted to have a R facial droop  and then she was found down in the bathroom and was unable to get up. Fall itself was not witnessed. She was also found in the ED to be in Afib. At baseline she ambulates with a walker and sometimes needs 1 person assist.   Per family patient is DNR/DNI. Given LKN > 4 hrs prior, was excluded from tPa  She has no LVO, is not an endovascular candidate.     REVIEW OF SYSTEMS:  [x] Unable to assess ROS because patient is altered    OBJECTIVE:  ICU Vital Signs Last 24 Hrs  T(C): 35.8 (2020 10:57), Max: 35.8 (2020 10:57)  T(F): 96.5 (2020 10:57), Max: 96.5 (2020 10:57)  HR: 129 (2020 17:25) (72 - 129)  BP: 145/92 (2020 17:25) (145/92 - 248/90)  BP(mean): 102 (2020 17:25) (93 - 126)  ABP: --  ABP(mean): --  RR: 22 (2020 17:25) (17 - 28)  SpO2: 98% (2020 17:25) (89% - 100%)    CAPILLARY BLOOD GLUCOSE    PHYSICAL EXAM:  General:   HEENT:   Lymph Nodes:  Neck:   Respiratory:   Cardiovascular:   Abdomen:   Extremities:   Skin:   Neurological:  Psychiatry:    LINES: Devang FIERRO (placed     HOSPITAL MEDICATIONS:  Standing Meds:  ARIPiprazole 2 milliGRAM(s) Oral daily  aspirin Suppository 300 milliGRAM(s) Rectal daily  atorvastatin 80 milliGRAM(s) Oral at bedtime  enoxaparin Injectable 30 milliGRAM(s) SubCutaneous daily  ergocalciferol 97926 Unit(s) Oral every week  levothyroxine Injectable 62.5 MICROGram(s) IV Push at bedtime  valproate sodium IVPB 500 milliGRAM(s) IV Intermittent two times a day    PRN Meds:  labetalol Injectable 10 milliGRAM(s) IV Push three times a day PRN    LABS:                        9.8    4.88  )-----------( 143      ( 2020 10:25 )             31.2     Hgb Trend: 9.8<--      138  |  105  |  36<H>  ----------------------------<  107<H>  4.7   |  23  |  1.29    Ca    9.0      2020 10:25    TPro  7.2  /  Alb  3.8  /  TBili  0.6  /  DBili  x   /  AST  16  /  ALT  7<L>  /  AlkPhos  97      Creatinine Trend: 1.29<--  PT/INR - ( 2020 10:25 )   PT: 12.0 sec;   INR: 1.05 ratio         PTT - ( 2020 10:25 )  PTT:30.9 sec  Urinalysis Basic - ( 2020 16:48 )    Color: Light Yellow / Appearance: Clear / S.030 / pH: x  Gluc: x / Ketone: Negative  / Bili: Negative / Urobili: Negative   Blood: x / Protein: Trace / Nitrite: Negative   Leuk Esterase: Negative / RBC: 15 /hpf / WBC 0 /HPF   Sq Epi: x / Non Sq Epi: 0 /hpf / Bacteria: Negative            MICROBIOLOGY:     RADIOLOGY:  [ ] Reviewed and interpreted by me    EKG: HPI: 89F with PMHx of HTN, dementia, DM, prior stroke in 2018 (L parietal - residual mixed receptive/expressive aphasia) who presented to the ED with AMS. LKN determined to be 5:00 PM on 2020. At that time she was seen ambulating at her baseline and speaking, and then acutely became weaker (was not even able to stand up from couch) and became confused, asking odd questions and was disoriented. The morning of presentation she was also noted to have a R facial droop  and then she was found down in the bathroom and was unable to get up. Fall itself was not witnessed. She was also found in the ED to be in Afib. At baseline she ambulates with a walker and sometimes needs 1 person assist.  Per family patient is DNR/DNI. Given LKN > 4 hrs prior, was excluded from tPa  She has no LVO, is not an endovascular candidate.   Patient was admitted to the stroke unit on a nicardipine drip initially, discontinued before our evaluation. Late this afternoon she became agitative and combative, given haldol 2.5mg IV x2, and noted to be hypertensive with concern for respiratory distress and an expiratory "stridor." Seen by pulmonology, concern expressed for pulmonary edema, treated with lasix x1, labetalol 10 mg x1, and trial of bipap which was not successful due to patient agitation, but her respiratory status has since seemed to improve.    REVIEW OF SYSTEMS:  [x] Unable to assess ROS because patient is altered    OBJECTIVE:  ICU Vital Signs Last 24 Hrs  T(C): 35.8 (2020 10:57), Max: 35.8 (2020 10:57)  T(F): 96.5 (2020 10:57), Max: 96.5 (2020 10:57)  HR: 129 (2020 17:25) (72 - 129)  BP: 145/92 (2020 17:25) (145/92 - 248/90)  BP(mean): 102 (2020 17:25) (93 - 126)  ABP: --  ABP(mean): --  RR: 22 (2020 17:25) (17 - 28)  SpO2: 98% (2020 17:25) (89% - 100%)    CAPILLARY BLOOD GLUCOSE    General: Alert, disoriented, combative after physical stimuli, resting comfortably before.  HENT: NC/AT. Posterior oropharynx clear. Patent airway  Eyes: PERRL, EOMI  CV: Tachycardiac, irregularly irregular. No m/r/g. 2+ peripheral pulses. Extremities are warm and well perfused.  Respiratory: CTAB no w/r/r. No respiratory distress. Faint upper airway noise w/ expiration only and only while agitated, improves at rest  Abdominal: soft, non-distended, non-tender, no rebound, guarding, or rigidity  Neuro: Disoriented as above, speech incomprehensible, normal strength x4 extremities  Skin: no rashes    LINES: PIV, Siddiqi (placed     HOSPITAL MEDICATIONS:  Standing Meds:  ARIPiprazole 2 milliGRAM(s) Oral daily  aspirin Suppository 300 milliGRAM(s) Rectal daily  atorvastatin 80 milliGRAM(s) Oral at bedtime  enoxaparin Injectable 30 milliGRAM(s) SubCutaneous daily  ergocalciferol 55379 Unit(s) Oral every week  levothyroxine Injectable 62.5 MICROGram(s) IV Push at bedtime  valproate sodium IVPB 500 milliGRAM(s) IV Intermittent two times a day    PRN Meds:  labetalol Injectable 10 milliGRAM(s) IV Push three times a day PRN    LABS:                        9.8    4.88  )-----------( 143      ( 2020 10:25 )             31.2     Hgb Trend: 9.8<--      138  |  105  |  36<H>  ----------------------------<  107<H>  4.7   |  23  |  1.29    Ca    9.0      2020 10:25    TPro  7.2  /  Alb  3.8  /  TBili  0.6  /  DBili  x   /  AST  16  /  ALT  7<L>  /  AlkPhos  97      Creatinine Trend: 1.29<--  PT/INR - ( 2020 10:25 )   PT: 12.0 sec;   INR: 1.05 ratio         PTT - ( 2020 10:25 )  PTT:30.9 sec  Urinalysis Basic - ( 2020 16:48 )    Color: Light Yellow / Appearance: Clear / S.030 / pH: x  Gluc: x / Ketone: Negative  / Bili: Negative / Urobili: Negative   Blood: x / Protein: Trace / Nitrite: Negative   Leuk Esterase: Negative / RBC: 15 /hpf / WBC 0 /HPF   Sq Epi: x / Non Sq Epi: 0 /hpf / Bacteria: Negative      RADIOLOGY:  < from: CT Head No Cont (20 @ 12:07) >    EXAM:  CT BRAIN                          PROCEDURE DATE:  2020    IMPRESSION:  No evidence of acute intracranial pathology.    Chronic bilateral MCA territory infarcts.    < end of copied text >    < from: CT Angio Head w/ IV Cont (20 @ 10:43) >    EXAM:  CT ANGIO NECK (W)AW IC                        EXAM:  CT ANGIO BRAIN (W)AW IC                          PROCEDURE DATE:  2020  IMPRESSION:    CTA neck: No large vessel occlusion or major stenosis.  CTA head: No large vessel occlusion or major stenosis.  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 Recommend serial imaging followed over time to assess for stability or change.    Dr. Myles Elizondo discussed findings with RADHA Chavez on 2020 at 10:50 AM    < end of copied text >    Assessment/Plan  89F with PMHx of HTN, dementia, DM, prior stroke w/ residual aphasia admitted on concern for cardioembolic CVA, MICU consulted for possible respiratory compromise which now seems improved    - POCUS reportedly with multifocal B lines before, now scant, suggesting improved pulm edema  - Recommend lower BP target, 140-180 systolic, to avoid pulmonary edema  - Low concern for upper airway issue given expiratory-only noise and only when agitated. Doubt allergic reaction to contrast earlier but relatively low risk of harm from treatment. Trend blood sugars closely after steroids.  - Good response to lasix, robust UOP. Reassess diuetic needs once or twice a day, low threshold for additional diuretics  - Continue home VPA, abilify for behavior control; consider supplementing with haldol or olanzapine for continued agitation/delirium    Does not require MICU level care right now. Will continue to follow. Please call for any additional concerns.    Jonathan Weil, PGY3  Discussed with Dr. Jackson HPI: 89F with PMHx of HTN, dementia, DM, prior stroke in 2018 (L parietal - residual mixed receptive/expressive aphasia) who presented to the ED with AMS. LKN determined to be 5:00 PM on 2020. At that time she was seen ambulating at her baseline and speaking, and then acutely became weaker (was not even able to stand up from couch) and became confused, asking odd questions and was disoriented. The morning of presentation she was also noted to have a R facial droop  and then she was found down in the bathroom and was unable to get up. Fall itself was not witnessed. She was also found in the ED to be in Afib. At baseline she ambulates with a walker and sometimes needs 1 person assist.  Per family patient is DNR/DNI. Given LKN > 4 hrs prior, was excluded from tPa  She has no LVO, is not an endovascular candidate.   Patient was admitted to the stroke unit on a nicardipine drip initially, discontinued before our evaluation. Late this afternoon she became agitative and combative, given haldol 2.5mg IV x2, and noted to be hypertensive with concern for respiratory distress and an expiratory "stridor." Seen by pulmonology, concern expressed for pulmonary edema, treated with lasix x1, labetalol 10 mg x1, and trial of bipap which was not successful due to patient agitation, but her respiratory status has since seemed to improve.    Fam/Soc/Surgical Hx: unable to obtained due to delirium/dementia    REVIEW OF SYSTEMS:  [x] Unable to assess ROS because patient is altered    OBJECTIVE:  ICU Vital Signs Last 24 Hrs  T(C): 35.8 (2020 10:57), Max: 35.8 (2020 10:57)  T(F): 96.5 (2020 10:57), Max: 96.5 (2020 10:57)  HR: 129 (2020 17:25) (72 - 129)  BP: 145/92 (2020 17:25) (145/92 - 248/90)  BP(mean): 102 (2020 17:25) (93 - 126)  RR: 22 (2020 17:25) (17 - 28)  SpO2: 98% (2020 17:25) (89% - 100%)    General: Alert, disoriented, combative after physical stimuli, resting comfortably before.  HENT: NC/AT. Posterior oropharynx clear. Patent airway  Eyes: PERRL, EOMI  CV: Tachycardiac, irregularly irregular. No m/r/g. 2+ peripheral pulses. Extremities are warm and well perfused.  Respiratory: CTAB no w/r/r. No respiratory distress. Faint upper airway noise w/ expiration only and only while agitated, improves at rest  Abdominal: soft, non-distended, non-tender, no rebound, guarding, or rigidity  Neuro: Disoriented as above, speech incomprehensible, normal strength x4 extremities  Skin: no rashes    LINES: PIV, Siddiqi (placed     HOSPITAL MEDICATIONS:  Standing Meds:  ARIPiprazole 2 milliGRAM(s) Oral daily  aspirin Suppository 300 milliGRAM(s) Rectal daily  atorvastatin 80 milliGRAM(s) Oral at bedtime  enoxaparin Injectable 30 milliGRAM(s) SubCutaneous daily  ergocalciferol 67437 Unit(s) Oral every week  levothyroxine Injectable 62.5 MICROGram(s) IV Push at bedtime  valproate sodium IVPB 500 milliGRAM(s) IV Intermittent two times a day    PRN Meds:  labetalol Injectable 10 milliGRAM(s) IV Push three times a day PRN    LABS:                        9.8    4.88  )-----------( 143      ( 2020 10:25 )             31.2     Hgb Trend: 9.8<--      138  |  105  |  36<H>  ----------------------------<  107<H>  4.7   |  23  |  1.29    Ca    9.0      2020 10:25    TPro  7.2  /  Alb  3.8  /  TBili  0.6  /  DBili  x   /  AST  16  /  ALT  7<L>  /  AlkPhos  97      Creatinine Trend: 1.29<--  PT/INR - ( 2020 10:25 )   PT: 12.0 sec;   INR: 1.05 ratio         PTT - ( 2020 10:25 )  PTT:30.9 sec  Urinalysis Basic - ( 2020 16:48 )    Color: Light Yellow / Appearance: Clear / S.030 / pH: x  Gluc: x / Ketone: Negative  / Bili: Negative / Urobili: Negative   Blood: x / Protein: Trace / Nitrite: Negative   Leuk Esterase: Negative / RBC: 15 /hpf / WBC 0 /HPF   Sq Epi: x / Non Sq Epi: 0 /hpf / Bacteria: Negative      RADIOLOGY:    Reviewed and interpreted by me.     < from: CT Head No Cont (20 @ 12:07) >    EXAM:  CT BRAIN                          PROCEDURE DATE:  2020    IMPRESSION:  No evidence of acute intracranial pathology.    Chronic bilateral MCA territory infarcts.    < end of copied text >    < from: CT Angio Head w/ IV Cont (20 @ 10:43) >    EXAM:  CT ANGIO NECK (W)AW IC                        EXAM:  CT ANGIO BRAIN (W)AW IC                          PROCEDURE DATE:  2020  IMPRESSION:    CTA neck: No large vessel occlusion or major stenosis.  CTA head: No large vessel occlusion or major stenosis.  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 Recommend serial imaging followed over time to assess for stability or change.    Dr. Myles Elizondo discussed findings with RADHA Chavez on 2020 at 10:50 AM    < end of copied text >    Assessment/Plan  89F with PMHx of HTN, dementia, DM, prior stroke w/ residual aphasia admitted on concern for cardioembolic CVA, MICU consulted for acute hypoxic respiratory failure in the setting of hypertensive emergency and resultant pulmonary edema, exacerbated by uncontrolled delirium.)    - POCUS reportedly with multifocal B lines before, now scant, suggesting improved pulm edema  - Recommend lower BP target, 140-180 systolic, to avoid pulmonary edema  - Low concern for upper airway issue given expiratory-only noise and only when agitated. Doubt allergic reaction to contrast earlier, suspect pulmonary edema precipitating respiratory failure  - Good response to lasix, robust UOP. Reassess diuetic needs once or twice a day, maintain net even to slightly negative.  - Continue home VPA, abilify for behavior control; consider supplementing with haldol or olanzapine for continued agitation/delirium, especially if pt is unable to take home meds.    Does not require MICU level care, continue excellent stroke unit management.    Jonathan Weil, PGY3  Discussed with Dr. Jackson

## 2020-01-23 NOTE — PHYSICAL THERAPY INITIAL EVALUATION ADULT - TRANSFER SAFETY CONCERNS NOTED: SIT/STAND, REHAB EVAL
decreased safety awareness/decreased weight-shifting ability/decreased step length/decreased sequencing ability

## 2020-01-23 NOTE — H&P ADULT - NSHPPHYSICALEXAM_GEN_ALL_CORE
Neurological Exam:  Mental Status: Alert, eyes open, not following any commands or tracking.   Cranial Nerves: PERRL, cannot assess EOM, R facial droop, no BTT bilaterally  Motor:   Tone: normal            Strength:     UE/LE: moving all extremities antigravity, however not following any commands, in lower limbs equally withdraws from painful stim.   Pronator drift: cannot assess              Dysmetria: cannot assess  Tremor: No resting, postural or action tremor.  No myoclonus.  Sensation: grossly intact to touch (painful stim)  Gait: deferred.

## 2020-01-23 NOTE — PHYSICAL THERAPY INITIAL EVALUATION ADULT - PERTINENT HX OF CURRENT PROBLEM, REHAB EVAL
89 y/o F, PMHx of HTN, dementia, DM, prior stroke in 2018 (L parietal - residual mixed receptive/expressive aphasia). Pt p/w with AMS. LKN after interviewing several family members in person and over the phone is 5:00 PM on 1/22/2020. At baseline she ambulates with a walker and sometimes needs 1 person assist. At 5:00 PM on 1/22/2020,

## 2020-01-23 NOTE — SPEECH LANGUAGE PATHOLOGY EVALUATION - SLP PERTINENT HISTORY OF CURRENT PROBLEM
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 LKN after interviewing several family members in person and over the phone is 5:00 PM on 1/22/2020. At baseline she ambulates with a walker and sometimes needs 1 person assist. At 5:00 PM on 1/22/2020, she was seen ambulating at her baseline and speaking, and then acutely became weaker (was not even able to stand up from couch) and became confused, she was asking odd questions and was disoriented. The morning of presentation, she was also noted to have a R facial droop, and family called EMS after she was seen ambulating to the bathroom (unclear if she was weak then), and then she was found down in the bathroom and was unable to get up. Fall itself was not witnessed. She was also found in the ED to be in Afib. Per family patient is DNR/DNI.

## 2020-01-23 NOTE — SPEECH LANGUAGE PATHOLOGY EVALUATION - SLP DIAGNOSIS
Consult for speech language evaluation received and appreciated. Chart reviewed and events noted. As per discussion with stroke team pt going to MRI - this service to follow up and reattempt as schedule permits.

## 2020-01-23 NOTE — PATIENT PROFILE ADULT - NSPROPTRIGHTCAREGIVER_GEN_A_NUR
Sylvia Score Total Score: 1   Sylvia score 3 or > [] Bed Alarm [] Avasys [] 1:1 sitter [] Patient refused (Signed refusal form in chart)   Inocencio Score Inocencio Score: 22   Inocencio no

## 2020-01-23 NOTE — OCCUPATIONAL THERAPY INITIAL EVALUATION ADULT - PERTINENT HX OF CURRENT PROBLEM, REHAB EVAL
89 yo F with PMHx of HTN, dementia, DM, prior stroke in 2018 (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 EDIE after interviewing several family members in person and over the phone is 5:00 PM on 1/22/2020. At baseline she ambulates with a walker and sometimes needs 1 person assist. 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 EDIE after interviewing several family members in person and over the phone is 5:00 PM on 1/22/2020. At baseline she ambulates with a walker and sometimes needs 1 person assist. See below

## 2020-01-23 NOTE — CONSULT NOTE ADULT - SUBJECTIVE AND OBJECTIVE BOX
Pulmonary Consult Note:    CHIEF COMPLAINT: wheezing    HPI:  89 year old female with PMHx of HTN, dementia, DM, prior stroke in 2018 (L parietal - residual mixed receptive/expressive aphasia) who presented to the ED with AMS. At baseline she ambulates with a walker and sometimes needs 1 person assist. Last known normal was at 5:00 PM on 2020, she was seen ambulating at her baseline and speaking, and then acutely became weaker (was not even able to stand up from couch) and became confused, she was asking odd questions and was disoriented. The morning of presentation, she was also noted to have a R facial droop, and family called EMS after she was seen ambulating to the bathroom (unclear if she was weak then), and then she was found down in the bathroom and was unable to get up. Fall itself was not witnessed. She was also found in the ED to be in Afib. She was outside the window for TPA.     Per family patient is DNR/DNI.     PAST MEDICAL & SURGICAL HISTORY: Unknown    FAMILY HISTORY: Non-contributory     SOCIAL HISTORY:  Smoking: [ ] Never Smoked [ ] Former Smoker (__ packs x ___ years) [ ] Current Smoker  (__ packs x ___ years)  Substance Use: [ ] Never Used [ ] Used ____  EtOH Use:  Marital Status: [ ] Single [ ]  [ ]  [ ]   Sexual History:   Occupation:  Recent Travel:  Country of Birth:  Advance Directives:    Allergies  Allergy Status Unknown    HOME MEDICATIONS:  Home Medications:  ARIPiprazole 2 mg oral tablet: 1 tab(s) orally once a day (2020 11:58)  Aspir 81 oral delayed release tablet: 1 tab(s) orally once a day (2020 11:57)  atorvastatin 20 mg oral tablet: 1 tab(s) orally once a day (2020 11:56)  divalproex sodium 125 mg oral delayed release capsule: 1 cap(s) orally 3 times a day (2020 11:59)  labetalol 200 mg oral tablet: 1 tab(s) orally 2 times a day (2020 11:58)  levothyroxine 125 mcg (0.125 mg) oral tablet: 1 tab(s) orally once a day (2020 11:58)  valsartan-hydrochlorothiazide 80mg-12.5mg oral tablet: 1 tab(s) orally once a day (2020 11:57)  Vitamin D2 50,000 intl units (1.25 mg) oral capsule: 1 cap(s) orally once a week (2020 11:57)    REVIEW OF SYSTEMS:  CONSTITUTIONAL: No fever, weight loss, or fatigue  EYES: No eye pain, visual disturbances, or discharge  ENMT:  No difficulty hearing, tinnitus, vertigo; No sinus or throat pain  NECK: No pain or stiffness  BREASTS: No pain, masses, or nipple discharge  RESPIRATORY: No cough, wheezing, chills or hemoptysis; No shortness of breath  CARDIOVASCULAR: No chest pain, palpitations, dizziness, or leg swelling  GASTROINTESTINAL: No abdominal or epigastric pain. No nausea, vomiting, or hematemesis; No diarrhea or constipation. No melena or hematochezia.  GENITOURINARY: No dysuria, frequency, hematuria, or incontinence  NEUROLOGICAL: No headaches, memory loss, loss of strength, numbness, or tremors  SKIN: No itching, burning, rashes, or lesions   LYMPH NODES: No enlarged glands  ENDOCRINE: No heat or cold intolerance; No hair loss  MUSCULOSKELETAL: No joint pain or swelling; No muscle, back, or extremity pain  PSYCHIATRIC: No depression, anxiety, mood swings, or difficulty sleeping  HEME/LYMPH: No easy bruising, or bleeding gums  ALLERGY AND IMMUNOLOGIC: No hives or eczema    OBJECTIVE:  ICU Vital Signs Last 24 Hrs  T(C): 35.8 (2020 10:57), Max: 35.8 (2020 10:57)  T(F): 96.5 (2020 10:57), Max: 96.5 (2020 10:57)  HR: 87 (2020 15:00) (72 - 117)  BP: 169/66 (2020 15:00) (155/131 - 248/90)  BP(mean): 95 (2020 15:00) (93 - 126)  RR: 18 (2020 15:00) (17 - 28)  SpO2: 100% (2020 15:00) (94% - 100%)      PHYSICAL EXAM:  GENERAL: NAD, well-groomed, well-developed  HEAD:  Atraumatic, Normocephalic  EYES: EOMI, PERRLA, conjunctiva and sclera clear  ENMT: No tonsillar erythema, exudates, or enlargement; Moist mucous membranes, Good dentition, No lesions  NECK: Supple, No JVD, Normal thyroid  NERVOUS SYSTEM:  Alert & Oriented X3, Good concentration; Motor Strength 5/5 B/L upper and lower extremities; DTRs 2+ intact and symmetric  CHEST/LUNG: Clear to percussion bilaterally; No rales, rhonchi, wheezing, or rubs  HEART: Regular rate and rhythm; No murmurs, rubs, or gallops  ABDOMEN: Soft, Nontender, Nondistended; Bowel sounds present  EXTREMITIES:  2+ Peripheral Pulses, No clubbing, cyanosis, or edema  LYMPH: No lymphadenopathy noted  SKIN: No rashes or lesions    HOSPITAL MEDICATIONS:  Standing Meds:  ARIPiprazole 2 milliGRAM(s) Oral daily  aspirin Suppository 300 milliGRAM(s) Rectal daily  atorvastatin 80 milliGRAM(s) Oral at bedtime  diphenhydrAMINE   Injectable 25 milliGRAM(s) IV Push once  enoxaparin Injectable 30 milliGRAM(s) SubCutaneous daily  ergocalciferol 86192 Unit(s) Oral every week  famotidine Injectable 20 milliGRAM(s) IV Push once  levothyroxine Injectable 62.5 MICROGram(s) IV Push at bedtime  LORazepam   Injectable 2 milliGRAM(s) IV Push once  methylPREDNISolone sodium succinate Injectable 125 milliGRAM(s) IV Push once  valproate sodium IVPB 500 milliGRAM(s) IV Intermittent two times a day      PRN Meds:  labetalol Injectable 10 milliGRAM(s) IV Push three times a day PRN      LABS:                        9.8    4.88  )-----------( 143      ( 2020 10:25 )             31.2     Hgb Trend: 9.8<--      138  |  105  |  36<H>  ----------------------------<  107<H>  4.7   |  23  |  1.29    Ca    9.0      2020 10:25    TPro  7.2  /  Alb  3.8  /  TBili  0.6  /  DBili  x   /  AST  16  /  ALT  7<L>  /  AlkPhos  97      Creatinine Trend: 1.29<--  PT/INR - ( 2020 10:25 )   PT: 12.0 sec;   INR: 1.05 ratio         PTT - ( 2020 10:25 )  PTT:30.9 sec  Urinalysis Basic - ( 2020 16:48 )    Color: Light Yellow / Appearance: Clear / S.030 / pH: x  Gluc: x / Ketone: Negative  / Bili: Negative / Urobili: Negative   Blood: x / Protein: Trace / Nitrite: Negative   Leuk Esterase: Negative / RBC: 15 /hpf / WBC 0 /HPF   Sq Epi: x / Non Sq Epi: 0 /hpf / Bacteria: Negative            MICROBIOLOGY:       RADIOLOGY:  [ ] Reviewed and interpreted by me    PULMONARY FUNCTION TESTS:    EKG: Pulmonary Consult Note:    CHIEF COMPLAINT: wheezing    HPI:  89 year old female with PMHx of HTN, dementia, DM, prior stroke in 2018 (L parietal - residual mixed receptive/expressive aphasia) who presented to the ED with AMS. At baseline she ambulates with a walker and sometimes needs 1 person assist. Last known normal was at 5:00 PM on 2020, she was seen ambulating at her baseline and speaking, and then acutely became weaker (was not even able to stand up from couch) and became confused, she was asking odd questions and was disoriented. The morning of presentation, she was also noted to have a R facial droop, and family called EMS after she was seen ambulating to the bathroom (unclear if she was weak then), and then she was found down in the bathroom and was unable to get up. Fall itself was not witnessed. She was also found in the ED to be in Afib. She was outside the window for TPA.     Per family patient is DNR/DNI. Was given contrast for imaging and subsequently developed expiratory wheezing and inspiratory stridor. Was not able to tolerate laying flat for the MRI. Was noted to become hypoxemic and MRI aborted. Pulmonary consulted for further evaluation of wheezing and hypoxemia.     POCUS CHEST: Bilateral B lines, no pleural effusions.     PAST MEDICAL & SURGICAL HISTORY: Unknown    FAMILY HISTORY: Non-contributory     SOCIAL HISTORY: Unable to obtain from patient    Allergies  Allergy Status Unknown    HOME MEDICATIONS:  Home Medications: (not verified, patient unable to answer questions)  ARIPiprazole 2 mg oral tablet: 1 tab(s) orally once a day (2020 11:58)  Aspir 81 oral delayed release tablet: 1 tab(s) orally once a day (2020 11:57)  atorvastatin 20 mg oral tablet: 1 tab(s) orally once a day (2020 11:56)  divalproex sodium 125 mg oral delayed release capsule: 1 cap(s) orally 3 times a day (2020 11:59)  labetalol 200 mg oral tablet: 1 tab(s) orally 2 times a day (2020 11:58)  levothyroxine 125 mcg (0.125 mg) oral tablet: 1 tab(s) orally once a day (2020 11:58)  valsartan-hydrochlorothiazide 80mg-12.5mg oral tablet: 1 tab(s) orally once a day (2020 11:57)  Vitamin D2 50,000 intl units (1.25 mg) oral capsule: 1 cap(s) orally once a week (2020 11:57)    REVIEW OF SYSTEMS: Unable to obtain from patient.     OBJECTIVE:  ICU Vital Signs Last 24 Hrs  T(C): 35.8 (2020 10:57), Max: 35.8 (2020 10:57)  T(F): 96.5 (2020 10:57), Max: 96.5 (2020 10:57)  HR: 87 (2020 15:00) (72 - 117)  BP: 169/66 (2020 15:00) (155/131 - 248/90)  BP(mean): 95 (2020 15:00) (93 - 126)  RR: 18 (2020 15:00) (17 - 28)  SpO2: 100% (2020 15:00) (94% - 100%)      PHYSICAL EXAM:  GENERAL: NAD, obese, disheveled, aphasia, unable to verbalize words.   HEAD:  Atraumatic, Normocephalic  EYES: EOMI, conjunctiva and sclera clear, puffy eyelids  ENMT: Moist mucous membranes, poor dentition, aphasia, +facial droop  NECK: Supple, No JVD, Normal thyroid  NERVOUS SYSTEM:  Alert & Oriented X0, delirious, agitated, Moves all extremities, climbing out of bed.   CHEST/LUNG: Bilateral wheezing, +rales bilaterally  HEART: Regular rate and rhythm; No murmurs, rubs, or gallops  ABDOMEN: Soft, Nontender, Nondistended; Bowel sounds present  EXTREMITIES:  2+ Peripheral Pulses, No clubbing, cyanosis, or edema  LYMPH: No lymphadenopathy noted  SKIN: +rash on [facility] MEDICATIONS:  Standing Meds:  ARIPiprazole 2 milliGRAM(s) Oral daily  aspirin Suppository 300 milliGRAM(s) Rectal daily  atorvastatin 80 milliGRAM(s) Oral at bedtime  diphenhydrAMINE   Injectable 25 milliGRAM(s) IV Push once  enoxaparin Injectable 30 milliGRAM(s) SubCutaneous daily  ergocalciferol 01666 Unit(s) Oral every week  famotidine Injectable 20 milliGRAM(s) IV Push once  levothyroxine Injectable 62.5 MICROGram(s) IV Push at bedtime  LORazepam   Injectable 2 milliGRAM(s) IV Push once  methylPREDNISolone sodium succinate Injectable 125 milliGRAM(s) IV Push once  valproate sodium IVPB 500 milliGRAM(s) IV Intermittent two times a day      PRN Meds:  labetalol Injectable 10 milliGRAM(s) IV Push three times a day PRN      LABS:                        9.8    4.88  )-----------( 143      ( 2020 10:25 )             31.2     Hgb Trend: 9.8<--  01    138  |  105  |  36<H>  ----------------------------<  107<H>  4.7   |  23  |  1.29    Ca    9.0      2020 10:25    TPro  7.2  /  Alb  3.8  /  TBili  0.6  /  DBili  x   /  AST  16  /  ALT  7<L>  /  AlkPhos  97      Creatinine Trend: 1.29<--  PT/INR - ( 2020 10:25 )   PT: 12.0 sec;   INR: 1.05 ratio         PTT - ( 2020 10:25 )  PTT:30.9 sec  Urinalysis Basic - ( 2020 16:48 )    Color: Light Yellow / Appearance: Clear / S.030 / pH: x  Gluc: x / Ketone: Negative  / Bili: Negative / Urobili: Negative   Blood: x / Protein: Trace / Nitrite: Negative   Leuk Esterase: Negative / RBC: 15 /hpf / WBC 0 /HPF   Sq Epi: x / Non Sq Epi: 0 /hpf / Bacteria: Negative    MICROBIOLOGY:

## 2020-01-23 NOTE — ED PROVIDER NOTE - CRITICAL CARE PROVIDED
consult w/ pt's family directly relating to pts condition/direct patient care (not related to procedure)/documentation/additional history taking/interpretation of diagnostic studies/conducted a detailed discussion of DNR status/telephone consultation with the patient's family

## 2020-01-23 NOTE — OCCUPATIONAL THERAPY INITIAL EVALUATION ADULT - ADDITIONAL COMMENTS
CT Head: No evidence of acute intracranial pathology. Chronic bilateral MCA territory infarcts. CT Head: No evidence of acute intracranial pathology. Chronic bilateral MCA territory infarcts.  Evolving left MCA infarct with hemorrhagic transformation. CT Head (1/25): Redemonstration remote infarcts, right lateral ventricle ex vacuo enlargement, microvascular disease, lacunar infarcts, volume loss loss, no new midline shift.

## 2020-01-23 NOTE — ED PROVIDER NOTE - PHYSICAL EXAMINATION
Vitals: WNL  Gen: laying comfortably in NAD  Head: NCAT  ENT: sclerae white, anicterus, moist mucous membranes. No exudates.   CV: RRR. Audible S1 and S2. No murmurs, rubs, gallops, S3, nor S4  Pulm: Clear to auscultation bilaterally. No wheezes, rales, or rhonchi  Abd: soft, normoactive BS x4, NTND, no rebound, no guarding, no rashes  Musculoskeletal:  No peripheral edema  Skin: no lesions or scars noted  Neurologic: awake, not alert, does not track, moves upper and lower extremities not to command  : no CVA tenderness

## 2020-01-23 NOTE — H&P ADULT - NSHPLABSRESULTS_GEN_ALL_CORE
9.8    4.88  )-----------( 143      ( 23 Jan 2020 10:25 )             31.2     01-23    138  |  105  |  36<H>  ----------------------------<  107<H>  4.7   |  23  |  1.29    Ca    9.0      23 Jan 2020 10:25    TPro  7.2  /  Alb  3.8  /  TBili  0.6  /  DBili  x   /  AST  16  /  ALT  7<L>  /  AlkPhos  97  01-23    CAPILLARY BLOOD GLUCOSE    PT/INR - ( 23 Jan 2020 10:25 )   PT: 12.0 sec;   INR: 1.05 ratio    PTT - ( 23 Jan 2020 10:25 )  PTT:30.9 sec    I&O's Summary    Vital Signs Last 24 Hrs  T(C): 35.8 (23 Jan 2020 10:57), Max: 35.8 (23 Jan 2020 10:57)  T(F): 96.5 (23 Jan 2020 10:57), Max: 96.5 (23 Jan 2020 10:57)  HR: 80 (23 Jan 2020 11:16) (72 - 117)  BP: 168/65 (23 Jan 2020 11:16) (155/131 - 248/90)  BP(mean): --  RR: 20 (23 Jan 2020 11:16) (17 - 25)  SpO2: 98% (23 Jan 2020 11:16) (94% - 100%)

## 2020-01-23 NOTE — PHYSICAL THERAPY INITIAL EVALUATION ADULT - IMPAIRED TRANSFERS: SIT/STAND, REHAB EVAL
decreased flexibility/decreased ROM/cognition/impaired balance/decreased strength/impaired postural control

## 2020-01-23 NOTE — H&P ADULT - ATTENDING COMMENTS
VASCULAR NEUROLOGY ATTENDING  The patient is seen and examined the history and imaging are reviewed. I agree with the resident note unless otherwise noted. In brief this is a 89 year old woman with HTN, dementia at baseline with some agitation able to speak but confused and disoriented vs aphasia from prior stroke in 2018 At baseline ambulates with walker requiring assistance with ADLs. On 1/23/20 presents to the ED with unresponsiveness and decreased verbal output. She has a history of AF off AC for falls. She is maintained on VPA 125mg for agitation. Overnight with worsening confusion and agitation requiring 1:1. Now febrile with shortness of breath. On exam she is cachectic moving all extremities within plane of bed. No verbal output. Not following commands. Etiology unclear possible cerebrovascular event unable to tolerate MRI will repeat CT head. Appreciate medicine assistance regarding dyspnea and ROMERO and fever. Will obtain EEG monitoring to r/o seizure. Goals of care discussion and management for agitated delirium.

## 2020-01-23 NOTE — OCCUPATIONAL THERAPY INITIAL EVALUATION ADULT - IMPAIRED TRANSFERS: SIT/STAND, REHAB EVAL
impaired balance/decreased ROM/impaired coordination/decreased flexibility/impaired motor control/cognition/impaired postural control/decreased strength

## 2020-01-23 NOTE — OCCUPATIONAL THERAPY INITIAL EVALUATION ADULT - STRENGTHENING, PT EVAL
GOAL: Pt will increase BLE strength to 3+/5 to increase participation in functional tasks in 4 weeks

## 2020-01-23 NOTE — H&P ADULT - ASSESSMENT
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 LKN after interviewing several family members in person and over the phone is 5:00 PM on 1/22/2020. At baseline she ambulates with a walker and sometimes needs 1 person assist. At 5:00 PM on 1/22/2020, she was seen ambulating at her baseline and speaking, and then acutely became weaker (was not even able to stand up from couch) and became confused, she was asking odd questions and was disoriented. The morning of presentation, she was also noted to have a R facial droop, and family called EMS after she was seen ambulating to the bathroom (unclear if she was weak then), and then she was found down in the bathroom and was unable to get up. Fall itself was not witnessed. She was also found in the ED to be in Afib.   Per family patient is DNR/DNI. Given LKN > 4 hrs prior, was excluded from tPa  She has no LVO, is not an endovascular candidate.   NIHSS 17, MRS 4    Impression: mixed receptive/expressive aphasia likely due to cardioembolism from Afib.     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 LKN after interviewing several family members in person and over the phone is 5:00 PM on 1/22/2020. At baseline she ambulates with a walker and sometimes needs 1 person assist. At 5:00 PM on 1/22/2020, she was seen ambulating at her baseline and speaking, and then acutely became weaker (was not even able to stand up from couch) and became confused, she was asking odd questions and was disoriented. The morning of presentation, she was also noted to have a R facial droop, and family called EMS after she was seen ambulating to the bathroom (unclear if she was weak then), and then she was found down in the bathroom and was unable to get up. Fall itself was not witnessed. She was also found in the ED to be in Afib.   Per family patient is DNR/DNI. Given LKN > 4 hrs prior, was excluded from tPa  She has no LVO, is not an endovascular candidate.   NIHSS 17, MRS 4    Impression: mixed receptive/expressive aphasia likely due to cardioembolism from Afib.     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 hx of seizures, is on depakote - R arm paretic after coming up to stroke unit)    Labs   [] CBC, BMP  [] Lipid panel  [] HbA1C    Consults  [] cardiology for Afib    Other  [] PT/OT  [] dysphagia screen

## 2020-01-23 NOTE — SWALLOW BEDSIDE ASSESSMENT ADULT - COMMENTS
Given LKN > 4 hrs prior, was excluded from tPa. She has no LVO, is not an endovascular candidate. NIHSS 17, MRS 4. Impression: mixed receptive/expressive aphasia likely due to cardioembolism from Afib. CTH: No evidence of acute intracranial pathology. Chronic bilateral MCA territory infarcts.

## 2020-01-23 NOTE — ED PROVIDER NOTE - NS ED ROS FT
Gen: No fever, normal appetite  Eyes: No eye irritation or discharge  ENT: No earpain, congestion, sore throat  Resp: No cough or trouble breathing  Cardiovascular: No chest pain or palpitation  Gastroenteric: No nausea/vomiting, diarrhea, constipation  : No dysuria  MS: No joint or muscle pain  Skin: No rashes  Neuro: +confusion, + L facial droop,   Remainder negative, except as per the HPI

## 2020-01-23 NOTE — CHART NOTE - NSCHARTNOTEFT_GEN_A_CORE
Notified by RN that patient was unable to tolerate MRI. Went to accompany patient who noted to be audibly wheezing. There was concern for allergic reaction to contrast given during CTA; given IV solumedrol, Pepcid, benadryl STAT. Pulmonary recommended to treat for allergic reaction. Post stat pulmonary bedside ultrasound evaluation it was recommended to give IV Lasix 40mg x1. Pulm recommended MICU consult for closer monitoring overnight. Will f/u consult. IV Haldol given for agitation x2 as recommended. Constant observation ordered. CXR pending. Continue on Depakote 500 BID as there is concern for seizures. Family updated at beside during events. Discussed with Dr. Mead. Will monitor closely. Notified by RN that patient was unable to tolerate MRI. Went to accompany patient who noted to be audibly wheezing. There was concern for allergic reaction to contrast given during CTA; given IV solumedrol, Pepcid, benadryl STAT. Pulmonary consulted STAT and performed pulmonary bedside ultrasound evaluation. Pulm recommended to give IV Lasix 40mg x1 and was advised to call MICU consult for closer monitoring overnight. IV Haldol given for agitation x2 as recommended. Constant observation ordered. CXR pending. Continue on Depakote 500 BID as there is concern for seizures. Family updated at beside during events. C- collar placed in ER due to concern secondary to being found on fall and unclear history of trauma. CT Cervical spine reviewed with neuroradiology- no acute fracture or dislocation noted. Full range of motion noted with no visualized tenderness or discomfort. No tenderness to palpation illicited. Discussed with family in extent as we prefer further imaging with MRI to absolutely secure the diagnosis of no injury they opted to maintain comfort for patient including removing the c-spine collar to accomplish that. Risks and benefits discussed in extent with verbalized and expressed understanding by HCP and bedside family. Discussed with Dr. Mead. Will monitor closely and f/u MICU consult.

## 2020-01-23 NOTE — PHYSICAL THERAPY INITIAL EVALUATION ADULT - ADDITIONAL COMMENTS
Per H&P: At baseline she ambulates with a walker and sometimes needs 1 person assist. Per social work note: spoke to pt emergency contact, son Jeromy Nguyen ([phone]) at bedside to complete assessment. Patient lives with son and daughter-in-law in a private residence in [place] (3 steps inside and 14 stairs inside). Per son, patient requires assistance with all ADLs and ambulates with walker. Patient also has a cane, wheelchair, and shower chair at home. Per son, someone is home with patient 24/7 to provide care, and the family also private hires [facility] as needed.

## 2020-01-23 NOTE — PHYSICAL THERAPY INITIAL EVALUATION ADULT - BALANCE TRAINING, PT EVAL
GOAL: Pt will improve static/dynamic sitting and standing balance by at least 1/2 grade to decrease fall risk and improve ease with functional mobility within 3-4 wks.

## 2020-01-23 NOTE — OCCUPATIONAL THERAPY INITIAL EVALUATION ADULT - DIAGNOSIS, OT EVAL
Pt p/w ?expressive aphasia, decreased strength, balance, cognition, impacting ADLs and functional mobility

## 2020-01-23 NOTE — CHART NOTE - NSCHARTNOTEFT_GEN_A_CORE
ENT called to evaluate 89 yo female after reported stridor, upon arrival pulmonology attending Dr. Boyd stated the patient was wheezing and not stridorous and to hold off on the laryngoscopy as pt is agitated and likely related to upper airway.     If needed, reconsult ENT as we have a 24/7 service call [phone]

## 2020-01-23 NOTE — PHYSICAL THERAPY INITIAL EVALUATION ADULT - GENERAL OBSERVATIONS, REHAB EVAL
Pt received semi-supine in NAD, +IVF, +tele, +cont pulse ox, +BP cuff, VSS, lethargic, family bedside

## 2020-01-23 NOTE — ED ADULT NURSE NOTE - OBJECTIVE STATEMENT
89 y F with Pmhx of CAD, HTN, DM and Dementia presents to the ED with R sided facial droop. Family reports that normal baseline is walking around with a walker and talking. Per son this morning, Pt got up and walked and they heard a thud from upstairs and found the pt. Upon arrival, pt is C-Collar. FS 95.  Pt is globally aphasic. Pt doesn't follow commands but moves all extremities. 89 y F with Pmhx of CAD, HTN, DM and Dementia presents to the ED with R sided facial droop and AMS, Code Stroke. Family reports that normal baseline is walking around with a walker and talking. Per son this morning, Pt got up and walked and they heard a thud from upstairs and found the pt. last known normal was 5pm on 1/22. Upon arrival, pt is C-Collar. FS 95.  Pt is globally aphasic. Pt doesn't follow commands but moves all extremities. Pt is alert. PERRL 4 mm. PISANO.  Breathing spontaneously and unlabored. Lungs bilaterally CTA. 89 y F with Pmhx of CAD, HTN, DM and Dementia presents to the ED with L sided facial droop and AMS, Code Stroke. Family reports that normal baseline is walking around with a walker and talking. Per son this morning, Pt got up and walked and they heard a thud from upstairs and found the pt. last known normal was 5pm on 1/22. Upon arrival, pt is C-Collar. FS 95.  Pt is globally aphasic. Pt doesn't follow commands but moves all extremities. Pt is alert. PERRL 4 mm. PISANO.  Breathing spontaneously and unlabored. Lungs bilaterally CTA. S1 and S2 heard. Peripheral pulses strong and equal bilaterally. On cardiac monitor, Afib, new onset. Abdomen soft, nontender, nondistended, positive bowel sounds in all four quadrants. EKG done. Seen and evaluated by MD.

## 2020-01-23 NOTE — SWALLOW BEDSIDE ASSESSMENT ADULT - SWALLOW EVAL: DIAGNOSIS
Consult for bedside swallow evaluation received and appreciated. Chart reviewed and events noted. As per discussion with stroke team pt going to MRI - this service to follow up and reattempt as schedule permits.

## 2020-01-23 NOTE — OCCUPATIONAL THERAPY INITIAL EVALUATION ADULT - PRECAUTIONS/LIMITATIONS, REHAB EVAL
At 5:00 PM on 1/22/2020, she was seen ambulating at her baseline and speaking, and then acutely became weaker (was not even able to stand up from couch) and became confused, she was asking odd questions and was disoriented. The morning of presentation, she was also noted to have a R facial droop, and family called EMS after she was seen ambulating to the bathroom (unclear if she was weak then), and then she was found down in the bathroom and was unable to get up. Fall itself was not witnessed. She was also found in the ED to be in Afib. Per family patient is DNR/DNI. Given LKN > 4 hrs prior, was excluded from tPa. She has no LVO, is not an endovascular candidate. At 5:00 PM on 1/22/2020, she was seen ambulating at her baseline and speaking, and then acutely became weaker (was not even able to stand up from couch) and became confused, she was asking odd questions and was disoriented. The morning of presentation, she was also noted to have a R facial droop, and family called EMS after she was seen ambulating to the bathroom (unclear if she was weak then), and then she was found down in the bathroom and was unable to get up. Fall itself was not witnessed. She was also found in the ED to be in Afib. Per family patient is DNR/DNI. Given LKN > 4 hrs prior, was excluded from tPa. She has no LVO, is not an endovascular candidate./fall precautions

## 2020-01-23 NOTE — PHYSICAL THERAPY INITIAL EVALUATION ADULT - FOLLOWS COMMANDS/ANSWERS QUESTIONS, REHAB EVAL
able to follow single-step instructions/25% of the time/pt required tactile/verbal cues and family translating

## 2020-01-23 NOTE — PHYSICAL THERAPY INITIAL EVALUATION ADULT - PRECAUTIONS/LIMITATIONS, REHAB EVAL
CONT: she was seen ambulating at her baseline and speaking, and then acutely became weaker (was not even able to stand up from couch) and became confused, she was asking odd questions and was disoriented. The morning of presentation, she was also noted to have a R facial droop, and family called EMS after she was seen ambulating to the bathroom (unclear if she was weak then), and then she was found down in the bathroom and was unable to get up. Fall itself was not witnessed. She was also found in the ED to be in Afib. Per family patient is DNR/DNI. Given LKN > 4 hrs prior, was excluded from tPa. She has no LVO, is not an endovascular candidate. CT brain: No acute intracranial hemorrhage, mass effect, vasogenic edema, or evidence of acute territorial infarct. Chronic b/l posterior MCA territory infarcts. Mild to moderate white matter microvascular ischemic disease. CTA neck & head: No large vessel occlusion or major stenosis. 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

## 2020-01-23 NOTE — OCCUPATIONAL THERAPY INITIAL EVALUATION ADULT - PLANNED THERAPY INTERVENTIONS, OT EVAL
bed mobility training/balance training/transfer training/cognitive, visual perceptual/strengthening/ROM/ADL retraining

## 2020-01-23 NOTE — PHYSICAL THERAPY INITIAL EVALUATION ADULT - PASSIVE RANGE OF MOTION EXAMINATION, REHAB EVAL
Right LE Passive ROM was WFL (within functional limits)/Left LE Passive ROM was WFL (within functional limits)

## 2020-01-23 NOTE — ED PROVIDER NOTE - CLINICAL SUMMARY MEDICAL DECISION MAKING FREE TEXT BOX
89F h/o HTN, dementia, DM, stroke (mixed receptive/expressive aphasia) presents with AMS, + L facial droop. + code stroke called. pt awake, not alert or responding to commands, moves UE and lower extremities but not to command. Concern for hemorrhagic stroke v ischemic stroke. however, given pt had fell, unclear if pt had fell causing a traumatic bleed leading to the symptoms. however family now reporting pt was more confused since last night. will also r/o infection, metabolic encephalopathy. 89F h/o HTN, dementia, DM, stroke (mixed receptive/expressive aphasia) presents with AMS, + L facial droop. + code stroke called. pt awake, not alert or responding to commands, moves UE and lower extremities but not to command. Concern for hemorrhagic stroke v ischemic stroke. however, given pt had fell, unclear if pt had fell causing a traumatic bleed leading to the symptoms. however family now reporting pt was more confused since last night. will also r/o infection, metabolic encephalopathy.  denys pt called in ams with sonorous respiration facial droop noted at home bp > 200 systolic - on arrival altered moving all extrem to pain, mild facial droop -- direct to ct -- no anticaog, - unsure when last nml is

## 2020-01-23 NOTE — ED PROVIDER NOTE - OBJECTIVE STATEMENT
89F h/o HTN, dementia, DM, stroke (mixed receptive/expressive aphasia) presents with AMS. LKN 8am. Pt was talking to family. family then heard a thud and pt was confused and found to have L facial droop, not speaking. pt on aspirin. however, upon further questioning of family, family member reports pt more confused since last night.    DNR/DNI, ok for pressors

## 2020-01-23 NOTE — CHART NOTE - NSCHARTNOTEFT_GEN_A_CORE
Notified by ICU that patient is to be continued to be monitored in stroke unit.    ICU is aware of patient case and can be directly contacted if patient decompensates overnight.    d/w Stroke team, ICU, nursing

## 2020-01-23 NOTE — ED ADULT NURSE NOTE - NSIMPLEMENTINTERV_GEN_ALL_ED
Implemented All Fall with Harm Risk Interventions:  [place] to call system. Call bell, personal items and telephone within reach. Instruct patient to call for assistance. Room bathroom lighting operational. Non-slip footwear when patient is off stretcher. Physically safe environment: no spills, clutter or unnecessary equipment. Stretcher in lowest position, wheels locked, appropriate side rails in place. Provide visual cue, wrist band, yellow gown, etc. Monitor gait and stability. Monitor for mental status changes and reorient to person, place, and time. Review medications for side effects contributing to fall risk. Reinforce activity limits and safety measures with patient and family. Provide visual clues: red socks.

## 2020-01-23 NOTE — H&P ADULT - HISTORY OF PRESENT ILLNESS
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 LKN after interviewing several family members in person and over the phone is 5:00 PM on 1/22/2020. At baseline she ambulates with a walker and sometimes needs 1 person assist. At 5:00 PM on 1/22/2020, she was seen ambulating at her baseline and speaking, and then acutely became weaker (was not even able to stand up from couch) and became confused, she was asking odd questions and was disoriented. The morning of presentation, she was also noted to have a R facial droop, and family called EMS after she was seen ambulating to the bathroom (unclear if she was weak then), and then she was found down in the bathroom and was unable to get up. Fall itself was not witnessed. She was also found in the ED to be in Afib.   Per family patient is DNR/DNI. Given LKN > 4 hrs prior, was excluded from tPa  She has no LVO, is not an endovascular candidate.   NIHSS 17, MRS 4

## 2020-01-24 NOTE — CHART NOTE - NSCHARTNOTEFT_GEN_A_CORE
Obtain screening lower extremity venous ultrasound in patients who meet 1 or more of the following criteria as patient is high risk for DVT/PE on admission:   [] History of DVT/PE  []Hypercoagulable states (Factor V Leiden, Cancer, OCP, etc. )  [X]Prolonged immobility (hemiplegia/hemiparesis/post operative or any other extended immobilization)   [] Transferred from outside facility (Rehab or Long term care)  [] Age </= to 50 Obtain screening lower extremity venous ultrasound in patients who meet 1 or more of the following criteria as patient is high risk for DVT/PE on admission:   [] History of DVT/PE  []Hypercoagulable states (Factor V Leiden, Cancer, OCP, etc. )  []Prolonged immobility (hemiplegia/hemiparesis/post operative or any other extended immobilization)   [] Transferred from outside facility (Rehab or Long term care)  [] Age </= to 50

## 2020-01-24 NOTE — DIETITIAN INITIAL EVALUATION ADULT. - PHYSICAL APPEARANCE
overweight/Nutrition Focused Physical Assessment  done, RN in room, no overt signs of fat, muscle loss

## 2020-01-24 NOTE — DIETITIAN INITIAL EVALUATION ADULT. - ENERGY NEEDS
Ht: 60"  Wt: 163  BMI: 31.9 kg/m2   IBW: 100 (+/-10%)     163% IBW  Edema: none        Skin: no pressure injuries documented

## 2020-01-24 NOTE — CONSULT NOTE ADULT - SUBJECTIVE AND OBJECTIVE BOX
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 LKN after interviewing several family members in person and over the phone is 5:00 PM on 2020. At baseline she ambulates with a walker and sometimes needs 1 person assist. At 5:00 PM on 2020, she was seen ambulating at her baseline and speaking, and then acutely became weaker (was not even able to stand up from couch) and became confused, she was asking odd questions and was disoriented. The morning of presentation, she was also noted to have a R facial droop, and family called EMS after she was seen ambulating to the bathroom (unclear if she was weak then), and then she was found down in the bathroom and was unable to get up. Fall itself was not witnessed. She was also found in the ED to be in Afib.   Per family patient is DNR/DNI. Given LKN > 4 hrs prior, was excluded from tPa  She has no LVO, is not an endovascular candidate.   NIHSS 17, MRS 4 (2020 11:37)    Awake, non verbal  Agitated at times  PAST MEDICAL & SURGICAL HISTORY:      Review of Systems:   CONSTITUTIONAL: No fever, weight loss, or fatigue  EYES: No eye pain, visual disturbances, or discharge  ENMT:  No difficulty hearing, tinnitus, vertigo; No sinus or throat pain  NECK: No pain or stiffness  BREASTS: No pain, masses, or nipple discharge  RESPIRATORY: No cough, wheezing, chills or hemoptysis; No shortness of breath  CARDIOVASCULAR: No chest pain, palpitations, dizziness, or leg swelling  GASTROINTESTINAL: No abdominal or epigastric pain. No nausea, vomiting, or hematemesis; No diarrhea or constipation. No melena or hematochezia.  GENITOURINARY: No dysuria, frequency, hematuria, or incontinence  NEUROLOGICAL: No headaches, memory loss, loss of strength, numbness, or tremors  SKIN: No itching, burning, rashes, or lesions   LYMPH NODES: No enlarged glands  ENDOCRINE: No heat or cold intolerance; No hair loss  MUSCULOSKELETAL: No joint pain or swelling; No muscle, back, or extremity pain  PSYCHIATRIC: No depression, anxiety, mood swings, or difficulty sleeping  HEME/LYMPH: No easy bruising, or bleeding gums  ALLERY AND IMMUNOLOGIC: No hives or eczema    Allergies    Allergy Status Unknown    Intolerances        Social History:     FAMILY HISTORY:      MEDICATIONS  (STANDING):  ARIPiprazole 2 milliGRAM(s) Oral daily  aspirin Suppository 300 milliGRAM(s) Rectal daily  atorvastatin 80 milliGRAM(s) Oral at bedtime  enoxaparin Injectable 30 milliGRAM(s) SubCutaneous daily  ergocalciferol 25263 Unit(s) Oral every week  levothyroxine Injectable 62.5 MICROGram(s) IV Push at bedtime  sodium chloride 0.9%. 1000 milliLiter(s) (50 mL/Hr) IV Continuous <Continuous>  valproate sodium IVPB 500 milliGRAM(s) IV Intermittent two times a day    MEDICATIONS  (PRN):  labetalol Injectable 10 milliGRAM(s) IV Push three times a day PRN SBP > 180  levalbuterol Inhalation 0.63 milliGRAM(s) Inhalation every 6 hours PRN Shortness of breath/Wheezing        CAPILLARY BLOOD GLUCOSE        I&O's Summary    2020 07:01  -  2020 07:00  --------------------------------------------------------  IN: 335 mL / OUT: 2890 mL / NET: -2555 mL        PHYSICAL EXAM:  Vital Signs Last 24 Hrs  T(C): 36.6 (2020 20:00), Max: 38.3 (2020 07:46)  T(F): 97.8 (2020 20:00), Max: 101 (2020 07:46)  HR: 67 (2020 20:00) (62 - 128)  BP: 173/51 (2020 20:00) (137/125 - 195/62)  BP(mean): 87 (2020 20:00) (75 - 184)  RR: 16 (2020 20:00) (13 - 26)  SpO2: 97% (2020 20:00) (91% - 100%)    GENERAL: NAD, well-developed  HEAD:  Atraumatic, Normocephalic  EYES: EOMI, PERRLA, conjunctiva and sclera clear  NECK: Supple, No JVD  CHEST/LUNG: Clear to auscultation bilaterally; No wheeze  HEART: Regular rate and rhythm; No murmurs, rubs, or gallops  ABDOMEN: Soft, Nontender, Nondistended; Bowel sounds present  EXTREMITIES:  2+ Peripheral Pulses, No clubbing, cyanosis, or edema  PSYCH: AAOx3  NEUROLOGY: non-focal  SKIN: No rashes or lesions    LABS:                        10.4   6.40  )-----------( 114      ( 2020 04:52 )             32.9     01-24    139  |  99  |  47<H>  ----------------------------<  153<H>  4.6   |  21<L>  |  1.57<H>    Ca    9.2      2020 04:52    TPro  7.2  /  Alb  3.8  /  TBili  0.6  /  DBili  x   /  AST  16  /  ALT  7<L>  /  AlkPhos  97  01-23    PT/INR - ( 2020 10:25 )   PT: 12.0 sec;   INR: 1.05 ratio         PTT - ( 2020 10:25 )  PTT:30.9 sec      Urinalysis Basic - ( 2020 16:48 )    Color: Light Yellow / Appearance: Clear / S.030 / pH: x  Gluc: x / Ketone: Negative  / Bili: Negative / Urobili: Negative   Blood: x / Protein: Trace / Nitrite: Negative   Leuk Esterase: Negative / RBC: 15 /hpf / WBC 0 /HPF   Sq Epi: x / Non Sq Epi: 0 /hpf / Bacteria: Negative        RADIOLOGY & ADDITIONAL TESTS:    Imaging Personally Reviewed:    Consultant(s) Notes Reviewed:      Care Discussed with Consultants/Other Providers:

## 2020-01-24 NOTE — EEG REPORT - NS EEG TEXT BOX
[facility]   COMPREHENSIVE EPILEPSY CENTER   REPORT OF ROUTINE VIDEO EEG     [facility]: [address] [place] [address] [address], [place] [address], Ph#: [phone]  [facility]: [address], [place] [address], Ph#: [phone]  [facility]: [address] [place] [address], Ph#: [phone]    Patient Name: VERA JESUS  Age and : 89y ([birth year])  MRN #: [id number]  Location: [place] [address]  Referring Physician: Faustino Mead    Study Date: 20    _____________________________________________________________  TECHNICAL INFORMATION    Placement and Labeling of Electrodes:  The EEG was performed utilizing 20 channels referential EEG connections (coronal over temporal over parasagittal montage) using all standard 10-20 electrode placements with EKG.  Recording was at a sampling rate of 256 samples per second per channel.  Time synchronized digital video recording was done simultaneously with EEG recording.  A low light infrared camera was used for low light recording.  Tim and seizure detection algorithms were utilized.    _____________________________________________________________  HISTORY    Patient is a 89y old  Female who presents with a chief complaint of stroke (2020 12:19)      PERTINENT MEDICATION:  valproate sodium IVPB 500 milliGRAM(s) IV Intermittent two times a day    _____________________________________________________________  STUDY INTERPRETATION    Findings: The background was continuous, spontaneously variable and reactive. No posterior dominant rhythm seen.    Background Slowing:  Diffuse theta and polymorphic delta slowing.    Focal Slowing:   None were present.    Sleep Background:  Drowsiness was characterized by fragmentation, attenuation, and slowing of the background activity.    Stage II sleep transients were not recorded.    Other Non-Epileptiform Findings:  None were present.    Interictal Epileptiform Activity:   - Abundant spikes in the right hemisphere, located in the temporal (shifting max F8/T8, T8/P8), posterior quadrant (P4/O2/T8/P8), temporooccipital (max P8/O2), frontotemporal (max F4/T8), and broadly across entire right hemisphere.   - Occasional to frequent synchronous bifrontal spike and slow wave complexes (max Fp1, F4).    Events:  Clinical events: None recorded.  Seizures: None recorded.    Activation Procedures:   Hyperventilation was not performed.    Photic stimulation was not performed.     Artifacts:  Intermittent myogenic and movement artifacts were noted.    ECG:  The heart rate on single channel ECG was predominantly between  BPM.    _____________________________________________________________  EEG SUMMARY/CLASSIFICATION    Abnormal EEG in the awake, drowsy states.  - Abundant spikes in the right hemisphere, located in the temporal (shifting max F8/T8, T8/P8), posterior quadrant (P4/O2/T8/P8), temporooccipital (max P8/O2), frontotemporal (max F4/T8), and broadly across entire right hemisphere.   - Occasional to frequent synchronous bifrontal spike and slow wave complexes (max Fp1, F4).  - Moderate generalized slowing.    _____________________________________________________________  EEG IMPRESSION/CLINICAL CORRELATE    Abnormal EEG study.  1. Potential epileptogenic foci in the left frontal, right frontal, right temporal, right parietal and right occipital regions.  2. Moderate nonspecific diffuse or multifocal cerebral dysfunction.   3. No seizure seen.    _____________________________________________________________    Yamini Westbrook MD  Attending Physician, [facility]

## 2020-01-24 NOTE — DIETITIAN INITIAL EVALUATION ADULT. - PERTINENT MEDS FT
MEDICATIONS  (STANDING):  ARIPiprazole 2 milliGRAM(s) Oral daily  aspirin Suppository 300 milliGRAM(s) Rectal daily  atorvastatin 80 milliGRAM(s) Oral at bedtime  enoxaparin Injectable 30 milliGRAM(s) SubCutaneous daily  ergocalciferol 09384 Unit(s) Oral every week  levothyroxine Injectable 62.5 MICROGram(s) IV Push at bedtime  niCARdipine Infusion 5 mG/Hr (25 mL/Hr) IV Continuous <Continuous>  valproate sodium IVPB 500 milliGRAM(s) IV Intermittent two times a day    MEDICATIONS  (PRN):  labetalol Injectable 10 milliGRAM(s) IV Push three times a day PRN SBP > 180  levalbuterol Inhalation 0.63 milliGRAM(s) Inhalation every 6 hours PRN Shortness of breath/Wheezing

## 2020-01-24 NOTE — CONSULT NOTE ADULT - SUBJECTIVE AND OBJECTIVE BOX
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   R facial droop and a fall. Stroke code called in ED, CT head with old infarcts but she was outside the window for TPA. developed flash pulmonary edema when laying flat from MRI and had  hypertensive emergency, had  hypoxemia last night 2/2 flash pulmonary edema. has had cta neck as well. noticed with cr rise renal consult called.         PAST HISTORY pt unable to give ROS or medical hx   --------------------------------------------------------------------------------  PAST MEDICAL & SURGICAL HISTORY:    FAMILY HISTORY:    PAST SOCIAL HISTORY:    ALLERGIES & MEDICATIONS  --------------------------------------------------------------------------------  Allergies    Allergy Status Unknown    Intolerances      Standing Inpatient Medications  ARIPiprazole 2 milliGRAM(s) Oral daily  aspirin Suppository 300 milliGRAM(s) Rectal daily  atorvastatin 80 milliGRAM(s) Oral at bedtime  enoxaparin Injectable 30 milliGRAM(s) SubCutaneous daily  ergocalciferol 63799 Unit(s) Oral every week  levothyroxine Injectable 62.5 MICROGram(s) IV Push at bedtime  sodium chloride 0.9%. 1000 milliLiter(s) IV Continuous <Continuous>  valproate sodium IVPB 500 milliGRAM(s) IV Intermittent two times a day    PRN Inpatient Medications  labetalol Injectable 10 milliGRAM(s) IV Push three times a day PRN  levalbuterol Inhalation 0.63 milliGRAM(s) Inhalation every 6 hours PRN      REVIEW OF SYSTEMS  --------------------------------------------------------------------------------      VITALS/PHYSICAL EXAM  --------------------------------------------------------------------------------  T(C): 36.6 (01-24-20 @ 20:00), Max: 38.3 (01-24-20 @ 07:46)  HR: 67 (01-24-20 @ 20:00) (62 - 128)  BP: 173/51 (01-24-20 @ 20:00) (137/125 - 195/62)  RR: 16 (01-24-20 @ 20:00) (13 - 26)  SpO2: 97% (01-24-20 @ 20:00) (91% - 100%)  Wt(kg): --  Height (cm): 152.4 (01-23-20 @ 13:10)  Weight (kg): 74 (01-23-20 @ 13:10)  BMI (kg/m2): 31.9 (01-23-20 @ 13:10)  BSA (m2): 1.71 (01-23-20 @ 13:10)      01-23-20 @ 07:01  -  01-24-20 @ 07:00  --------------------------------------------------------  IN: 335 mL / OUT: 2890 mL / NET: -2555 mL      Physical Exam:  	Gen: Non toxic comfortable appearing but on 1:1 Observation when seen   	no jvd , supple neck,   	Pulm: decrease bs  no rales or ronchi or wheezing  	CV: RRR, S1S2; no rub  	Abd: +BS, soft, nontender/nondistended  	: No  distended bladder palp   	UE: Warm, no cyanosis  no clubbing,  no edema  	LE: Warm, no cyanosis  no clubbing, no edema  	Neuro:  non communicative   	Skin: Warm, no decrease skin turgor       LABS/STUDIES  --------------------------------------------------------------------------------              10.4   6.40  >-----------<  114      [01-24-20 @ 04:52]              32.9     139  |  99  |  47  ----------------------------<  153      [01-24-20 @ 04:52]  4.6   |  21  |  1.57        Ca     9.2     [01-24-20 @ 04:52]    TPro  7.2  /  Alb  3.8  /  TBili  0.6  /  DBili  x   /  AST  16  /  ALT  7   /  AlkPhos  97  [01-23-20 @ 10:25]    PT/INR: PT 12.0 , INR 1.05       [01-23-20 @ 10:25]  PTT: 30.9       [01-23-20 @ 10:25]      Creatinine Trend:  SCr 1.57 [01-24 @ 04:52]  SCr 1.29 [01-23 @ 10:25]    Urinalysis - [01-23-20 @ 16:48]      Color Light Yellow / Appearance Clear / SG 1.030 / pH 6.0      Gluc Negative / Ketone Negative  / Bili Negative / Urobili Negative       Blood Small / Protein Trace / Leuk Est Negative / Nitrite Negative      RBC 15 / WBC 0 / Hyaline 0 / Gran  / Sq Epi  / Non Sq Epi 0 / Bacteria Negative      HbA1c 5.2      [01-23-20 @ 21:03]  TSH 1.80      [01-23-20 @ 21:21]  Lipid: chol 116, TG 91, HDL 58, LDL 40      [01-23-20 @ 21:22]

## 2020-01-24 NOTE — PROGRESS NOTE ADULT - SUBJECTIVE AND OBJECTIVE BOX
Pulmonary Progress Note     Interval Events:  no acute events    SUBJECTIVE:  Minimally verbal    OBJECTIVE:  ICU Vital Signs Last 24 Hrs  T(C): 38.3 (2020 08:00), Max: 38.3 (2020 07:46)  T(F): 100.9 (2020 08:00), Max: 101 (2020 07:46)  HR: 74 (2020 12:00) (73 - 129)  BP: 138/49 (2020 12:00) (137/125 - 211/91)  BP(mean): 76 (2020 12:00) (75 - 184)  RR: 19 (2020 12:00) (13 - 28)  SpO2: 100% (2020 12:00) (89% - 100%)     @ 07:01  -   @ 07:00  --------------------------------------------------------  IN: 335 mL / OUT: 2890 mL / NET: -2555 mL    CAPILLARY BLOOD GLUCOSE  POCT Blood Glucose.: 95 mg/dL (2020 10:20)    PHYSICAL EXAM:  GENERAL: NAD, obese, disheveled, aphasia, unable to verbalize words.   HEAD:  Atraumatic, Normocephalic  EYES: EOMI, conjunctiva and sclera clear, puffy eyelids  ENMT: Moist mucous membranes, poor dentition, aphasia, +facial droop  NECK: Supple, No JVD, Normal thyroid  NERVOUS SYSTEM:  Alert & Oriented X0, delirious, agitated, Moves all extremities,  CHEST/LUNG: Clear to auscultation bilaterally, no wheezing.   HEART: Regular rate and rhythm; No murmurs, rubs, or gallops  ABDOMEN: Soft, Nontender, Nondistended; Bowel sounds present  EXTREMITIES:  2+ Peripheral Pulses, No clubbing, cyanosis, or edema  LYMPH: No lymphadenopathy noted  SKIN: +rash on RUE,     HOSPITAL MEDICATIONS:  MEDICATIONS  (STANDING):  ARIPiprazole 2 milliGRAM(s) Oral daily  aspirin Suppository 300 milliGRAM(s) Rectal daily  atorvastatin 80 milliGRAM(s) Oral at bedtime  enoxaparin Injectable 30 milliGRAM(s) SubCutaneous daily  ergocalciferol 94252 Unit(s) Oral every week  levothyroxine Injectable 62.5 MICROGram(s) IV Push at bedtime  sodium chloride 0.9%. 1000 milliLiter(s) (50 mL/Hr) IV Continuous <Continuous>  valproate sodium IVPB 500 milliGRAM(s) IV Intermittent two times a day    MEDICATIONS  (PRN):  labetalol Injectable 10 milliGRAM(s) IV Push three times a day PRN SBP > 180  levalbuterol Inhalation 0.63 milliGRAM(s) Inhalation every 6 hours PRN Shortness of breath/Wheezing      LABS:                        10.4   6.40  )-----------( 114      ( 2020 04:52 )             32.9     Hgb Trend: 10.4<--, 9.8<--      139  |  99  |  47<H>  ----------------------------<  153<H>  4.6   |  21<L>  |  1.57<H>    Ca    9.2      2020 04:52    TPro  7.2  /  Alb  3.8  /  TBili  0.6  /  DBili  x   /  AST  16  /  ALT  7<L>  /  AlkPhos  97      Creatinine Trend: 1.57<--, 1.29<--  PT/INR - ( 2020 10:25 )   PT: 12.0 sec;   INR: 1.05 ratio         PTT - ( 2020 10:25 )  PTT:30.9 sec  Urinalysis Basic - ( 2020 16:48 )    Color: Light Yellow / Appearance: Clear / S.030 / pH: x  Gluc: x / Ketone: Negative  / Bili: Negative / Urobili: Negative   Blood: x / Protein: Trace / Nitrite: Negative   Leuk Esterase: Negative / RBC: 15 /hpf / WBC 0 /HPF   Sq Epi: x / Non Sq Epi: 0 /hpf / Bacteria: Negative    Arterial Blood Gas:   @ 18:58  7.32/42/104/21/98/-3.8  ABG lactate: --    RADIOLOGY:  [ ] Reviewed and interpreted by me

## 2020-01-24 NOTE — PROGRESS NOTE ADULT - ATTENDING COMMENTS
Patient seen and examined, data reviewed. Improved from the pulmonary standpoint without further wheezing.  Agree with plan as outlined above. Please reconsult as needed.

## 2020-01-24 NOTE — CONSULT NOTE ADULT - SUBJECTIVE AND OBJECTIVE BOX
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 LKN after interviewing several family members in person and over the phone is 5:00 PM on 1/22/2020. At baseline she ambulates with a walker and sometimes needs 1 person assist. At 5:00 PM on 1/22/2020, she was seen ambulating at her baseline and speaking, and then acutely became weaker (was not even able to stand up from couch) and became confused, she was asking odd questions and was disoriented. The morning of presentation, she was also noted to have a R facial droop, and family called EMS after she was seen ambulating to the bathroom (unclear if she was weak then), and then she was found down in the bathroom and was unable to get up. Fall itself was not witnessed. She was also found in the ED to be in Afib.   Per family patient is DNR/DNI. Given LKN > 4 hrs prior, was excluded from tPa  She has no LVO, is not an endovascular candidate.   NIHSS 17, MRS 4 (23 Jan 2020 11:37)      Imaging:  L 1cm saccular aneurysm    Exam:  Patient is currently not responsive (given Haldol)  Per report was NORRIS, with AMS    --Anticoagulation:  enoxaparin Injectable 30 milliGRAM(s) SubCutaneous daily    =====================  PAST MEDICAL HISTORY     PAST SURGICAL HISTORY         MEDICATIONS:  Antibiotics:    Neuro:  ARIPiprazole 2 milliGRAM(s) Oral daily  aspirin Suppository 300 milliGRAM(s) Rectal daily  valproate sodium IVPB 500 milliGRAM(s) IV Intermittent two times a day    Other:  atorvastatin 80 milliGRAM(s) Oral at bedtime  ergocalciferol 98390 Unit(s) Oral every week  labetalol Injectable 10 milliGRAM(s) IV Push three times a day PRN  levalbuterol Inhalation 0.63 milliGRAM(s) Inhalation every 6 hours PRN  levothyroxine Injectable 62.5 MICROGram(s) IV Push at bedtime  sodium chloride 0.9%. 1000 milliLiter(s) IV Continuous <Continuous>      SOCIAL HISTORY:   Occupation:   Marital Status:     FAMILY HISTORY:      ROS: Negative except per HPI    LABS:  PT/INR - ( 23 Jan 2020 10:25 )   PT: 12.0 sec;   INR: 1.05 ratio         PTT - ( 23 Jan 2020 10:25 )  PTT:30.9 sec                        10.4   6.40  )-----------( 114      ( 24 Jan 2020 04:52 )             32.9     01-24    139  |  99  |  47<H>  ----------------------------<  153<H>  4.6   |  21<L>  |  1.57<H>    Ca    9.2      24 Jan 2020 04:52    TPro  7.2  /  Alb  3.8  /  TBili  0.6  /  DBili  x   /  AST  16  /  ALT  7<L>  /  AlkPhos  97  01-23

## 2020-01-24 NOTE — DIETITIAN INITIAL EVALUATION ADULT. - OTHER INFO
Pt seen for: Stroke Unit length of stay   Adm dx: CVA    GI issues: no N/V  Last BM: 1/23    Food allergies/Intolerances: NKFA   Vit/supplement PTA: none noted    Diet PTA: unable to assess at this time      Subjective/Objective information: pt confused, agitated, no visitors at bedside     Education: Not indicated at this time

## 2020-01-24 NOTE — DIETITIAN INITIAL EVALUATION ADULT. - ENTERAL
If pt requires EN, recommend Jevity 1.2 at 50cc/hr x 24 hrs to provide 1440 kcals, 67gm protein, 968cc free water  meets 19 Kcal/Kg dosing wt 74 kg, 1.5Gm/kg IBW 45.4 kg

## 2020-01-24 NOTE — PROGRESS NOTE ADULT - ASSESSMENT
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 Stroke code called in ED, CT head with old infarcts but she was outside the window for TPA. developed flash pulmonary edema when laying flat from MRI in the setting of hypertensive emergency. RVP negative. CXR with no acute findings. She was diuresed, and now no longer wheezing. Clinically suspect hypoxemia last night was 2/2 flash pulmonary edema in the setting of Hypertensive crisis. Now resolved.     - Cautiously control BP and fluid status  - F/u formal ECHO  - lease call back with any further questions.     Mery Oshea MD  Pulmonary & Critical Care Medicine Fellow | PGY-5  Pager: [phone]  Spectra: [phone] ([facility])

## 2020-01-25 NOTE — SWALLOW BEDSIDE ASSESSMENT ADULT - SWALLOW EVAL: DIAGNOSIS
SLP attempted to see Pt for swallowing and speech language evaluations however Pt with periords of agitation and restlessness requiring medication per d/w Nsg Ericka.  Pt currently lethargic at this time.  Pt only briefly aroused to verbal and tactile stimulation and not following commands this AM. SLP to f/u at a later date..

## 2020-01-25 NOTE — PROGRESS NOTE ADULT - SUBJECTIVE AND OBJECTIVE BOX
THE PATIENT WAS SEEN AND EXAMINED BY ME WITH THE HOUSESTAFF AND STROKE TEAM DURING MORNING ROUNDS.   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 LKN after interviewing several family members in person and over the phone is 5:00 PM on 1/22/2020. At baseline she ambulates with a walker and sometimes needs 1 person assist. At 5:00 PM on 1/22/2020, she was seen ambulating at her baseline and speaking, and then acutely became weaker (was not even able to stand up from couch) and became confused, she was asking odd questions and was disoriented. On day of admission she was also noted to have a R facial droop, and family called EMS after she was seen ambulating to the bathroom (unclear if she was weak then), and then she was found down in the bathroom and was unable to get up. Per family patient is DNR/DNI. Given LKN > 4 hrs prior, was excluded from tPa. She has no large vessel occlusion: is not an endovascular candidate. NIHSS 17, MRS 4    SUBJECTIVE: Agitated overnight.  No new neurologic complaints.      ARIPiprazole 2 milliGRAM(s) Oral daily  aspirin Suppository 300 milliGRAM(s) Rectal daily  atorvastatin 80 milliGRAM(s) Oral at bedtime  enoxaparin Injectable 30 milliGRAM(s) SubCutaneous daily  ergocalciferol 40372 Unit(s) Oral every week  labetalol Injectable 10 milliGRAM(s) IV Push three times a day PRN  levalbuterol Inhalation 0.63 milliGRAM(s) Inhalation every 6 hours PRN  levothyroxine Injectable 62.5 MICROGram(s) IV Push at bedtime  sodium chloride 0.9%. 1000 milliLiter(s) IV Continuous <Continuous>  valproate sodium IVPB 500 milliGRAM(s) IV Intermittent two times a day      PHYSICAL EXAM:   Vital Signs Last 24 Hrs  T(C): 36.6 (24 Jan 2020 20:00), Max: 37.2 (24 Jan 2020 10:00)  T(F): 97.8 (24 Jan 2020 20:00), Max: 99 (24 Jan 2020 10:00)  HR: 81 (25 Jan 2020 08:00) (58 - 96)  BP: 170/63 (25 Jan 2020 08:00) (137/92 - 184/77)  BP(mean): 92 (25 Jan 2020 08:00) (76 - 104)  RR: 16 (25 Jan 2020 08:00) (14 - 24)  SpO2: 97% (25 Jan 2020 08:00) (95% - 100%)    General: No acute distress  HEENT: EOM intact,   Abdomen: Soft, nontender, nondistended   Extremities: No edema    NEUROLOGICAL EXAM:  Mental status: eyes closed, opens to voice and light touch, awake, mute, unable to follow commands  Cranial Nerves: No facial asymmetry, no nystagmus.  Motor exam: Normal tone, no drift, moves all extremities spontaneously within plane of bed.  Sensation: Intact to light touch   Coordination/ Gait: No dysmetria, RONNY intact and symmetric bilaterally, on bedrest     LABS:                        10.9   11.99 )-----------( 164      ( 25 Jan 2020 06:24 )             34.2    01-25    139  |  98  |  63<H>  ----------------------------<  93  3.8   |  22  |  1.67<H>    Ca    9.0      25 Jan 2020 06:24    TPro  7.2  /  Alb  3.8  /  TBili  0.6  /  DBili  x   /  AST  16  /  ALT  7<L>  /  AlkPhos  97  01-23  PT/INR - ( 23 Jan 2020 10:25 )   PT: 12.0 sec;   INR: 1.05 ratio         PTT - ( 23 Jan 2020 10:25 )  PTT:30.9 sec  Hemoglobin A1C, Whole Blood: 5.2 % (01-23 @ 21:03)      IMAGING: Reviewed by me.     EEG (01/24/20) Potential epileptogenic foci in the left frontal, right frontal, right temporal, right parietal and right occipital regions.  2. Moderate nonspecific diffuse or multifocal cerebral dysfunction.   3. No seizure seen    Head CT (01/23/20) No evidence of acute intracranial pathology.    Chronic bilateral MCA territory infarcts.    CTA head/Neck (01/23/20) CTA neck: No large vessel occlusion or major stenosis.    CTA head: No large vessel occlusion or major stenosis.    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 No prevertebral soft tissue swelling. Degenerative changes THE PATIENT WAS SEEN AND EXAMINED BY ME WITH THE HOUSESTAFF AND STROKE TEAM DURING MORNING ROUNDS.   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 LKN after interviewing several family members in person and over the phone is 5:00 PM on 1/22/2020. At baseline she ambulates with a walker and sometimes needs 1 person assist. At 5:00 PM on 1/22/2020, she was seen ambulating at her baseline and speaking, and then acutely became weaker (was not even able to stand up from couch) and became confused, she was asking odd questions and was disoriented. On day of admission she was also noted to have a R facial droop, and family called EMS after she was seen ambulating to the bathroom (unclear if she was weak then), and then she was found down in the bathroom and was unable to get up. Per family patient is DNR/DNI. Given LKN > 4 hrs prior, was excluded from tPa. She has no large vessel occlusion: is not an endovascular candidate. NIHSS 17, MRS 4    SUBJECTIVE: Agitated overnight.  No new neurologic complaints.      ARIPiprazole 2 milliGRAM(s) Oral daily  aspirin Suppository 300 milliGRAM(s) Rectal daily  atorvastatin 80 milliGRAM(s) Oral at bedtime  enoxaparin Injectable 30 milliGRAM(s) SubCutaneous daily  ergocalciferol 73999 Unit(s) Oral every week  labetalol Injectable 10 milliGRAM(s) IV Push three times a day PRN  levalbuterol Inhalation 0.63 milliGRAM(s) Inhalation every 6 hours PRN  levothyroxine Injectable 62.5 MICROGram(s) IV Push at bedtime  sodium chloride 0.9%. 1000 milliLiter(s) IV Continuous <Continuous>  valproate sodium IVPB 500 milliGRAM(s) IV Intermittent two times a day      PHYSICAL EXAM:   Vital Signs Last 24 Hrs  T(C): 36.6 (24 Jan 2020 20:00), Max: 37.2 (24 Jan 2020 10:00)  T(F): 97.8 (24 Jan 2020 20:00), Max: 99 (24 Jan 2020 10:00)  HR: 81 (25 Jan 2020 08:00) (58 - 96)  BP: 170/63 (25 Jan 2020 08:00) (137/92 - 184/77)  BP(mean): 92 (25 Jan 2020 08:00) (76 - 104)  RR: 16 (25 Jan 2020 08:00) (14 - 24)  SpO2: 97% (25 Jan 2020 08:00) (95% - 100%)    General: No acute distress  HEENT: EOM intact,   Abdomen: Soft, nontender, nondistended   Extremities: No edema    NEUROLOGICAL EXAM:  Mental status: eyes closed, opens to voice and light touch, awake, mute, unable to follow commands  Cranial Nerves: No facial asymmetry, no nystagmus.  Motor exam: Normal tone, no drift, moves all extremities spontaneously within plane of bed.  Sensation: Intact to light touch   Coordination/ Gait: No dysmetria, RONNY intact and symmetric bilaterally, on bedrest     LABS:                        10.9   11.99 )-----------( 164      ( 25 Jan 2020 06:24 )             34.2    01-25    139  |  98  |  63<H>  ----------------------------<  93  3.8   |  22  |  1.67<H>    Ca    9.0      25 Jan 2020 06:24    TPro  7.2  /  Alb  3.8  /  TBili  0.6  /  DBili  x   /  AST  16  /  ALT  7<L>  /  AlkPhos  97  01-23  PT/INR - ( 23 Jan 2020 10:25 )   PT: 12.0 sec;   INR: 1.05 ratio         PTT - ( 23 Jan 2020 10:25 )  PTT:30.9 sec  Hemoglobin A1C, Whole Blood: 5.2 % (01-23 @ 21:03)      IMAGING: Reviewed by me.     EEG (01/24/20) Potential epileptogenic foci in the left frontal, right frontal, right temporal, right parietal and right occipital regions. Moderate nonspecific diffuse or multifocal cerebral dysfunction.  No seizure seen    Head CT (01/23/20) No evidence of acute intracranial pathology. Chronic bilateral MCA territory infarcts.    CTA head/Neck (01/23/20) CTA neck: No large vessel occlusion or major stenosis.    CTA head: No large vessel occlusion or major stenosis. 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 No prevertebral soft tissue swelling. Degenerative changes THE PATIENT WAS SEEN AND EXAMINED BY ME WITH THE HOUSESTAFF AND STROKE TEAM DURING MORNING ROUNDS.   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 LKN after interviewing several family members in person and over the phone is 5:00 PM on 1/22/2020. At baseline she ambulates with a walker and sometimes needs 1 person assist. At 5:00 PM on 1/22/2020, she was seen ambulating at her baseline and speaking, and then acutely became weaker (was not even able to stand up from couch) and became confused, she was asking odd questions and was disoriented. On day of admission she was also noted to have a R facial droop, and family called EMS after she was seen ambulating to the bathroom (unclear if she was weak then), and then she was found down in the bathroom and was unable to get up. Per family patient is DNR/DNI. Given LKN > 4 hrs prior, was excluded from tPa. She has no large vessel occlusion: is not an endovascular candidate. NIHSS 17, MRS 4    SUBJECTIVE: Agitated overnight.  No new neurologic complaints.      ARIPiprazole 2 milliGRAM(s) Oral daily  aspirin Suppository 300 milliGRAM(s) Rectal daily  atorvastatin 80 milliGRAM(s) Oral at bedtime  enoxaparin Injectable 30 milliGRAM(s) SubCutaneous daily  ergocalciferol 15975 Unit(s) Oral every week  labetalol Injectable 10 milliGRAM(s) IV Push three times a day PRN  levalbuterol Inhalation 0.63 milliGRAM(s) Inhalation every 6 hours PRN  levothyroxine Injectable 62.5 MICROGram(s) IV Push at bedtime  sodium chloride 0.9%. 1000 milliLiter(s) IV Continuous <Continuous>  valproate sodium IVPB 500 milliGRAM(s) IV Intermittent two times a day      PHYSICAL EXAM:   Vital Signs Last 24 Hrs  T(C): 36.6 (24 Jan 2020 20:00), Max: 37.2 (24 Jan 2020 10:00)  T(F): 97.8 (24 Jan 2020 20:00), Max: 99 (24 Jan 2020 10:00)  HR: 81 (25 Jan 2020 08:00) (58 - 96)  BP: 170/63 (25 Jan 2020 08:00) (137/92 - 184/77)  BP(mean): 92 (25 Jan 2020 08:00) (76 - 104)  RR: 16 (25 Jan 2020 08:00) (14 - 24)  SpO2: 97% (25 Jan 2020 08:00) (95% - 100%)    General: No acute distress  HEENT: EOM intact, B/L no blink to threat  Abdomen: Soft, nontender, nondistended   Extremities: No edema    NEUROLOGICAL EXAM:  Mental status: eyes closed, opens to noxious stimuli, awake, mute, unable to follow commands  Cranial Nerves: Right nasolabial flattening, no nystagmus., B/L no blink to threat, oculocephalic intact  Motor exam: Normal tone, no drift, moves all extremities spontaneously against gravity  Sensation: appears Intact to noxious stimuli  Coordination/ Gait: unable to participate with exam     LABS:                        10.9   11.99 )-----------( 164      ( 25 Jan 2020 06:24 )             34.2    01-25    139  |  98  |  63<H>  ----------------------------<  93  3.8   |  22  |  1.67<H>    Ca    9.0      25 Jan 2020 06:24    TPro  7.2  /  Alb  3.8  /  TBili  0.6  /  DBili  x   /  AST  16  /  ALT  7<L>  /  AlkPhos  97  01-23  PT/INR - ( 23 Jan 2020 10:25 )   PT: 12.0 sec;   INR: 1.05 ratio         PTT - ( 23 Jan 2020 10:25 )  PTT:30.9 sec  Hemoglobin A1C, Whole Blood: 5.2 % (01-23 @ 21:03)      IMAGING: Reviewed by me.     EEG (01/24/20) Potential epileptogenic foci in the left frontal, right frontal, right temporal, right parietal and right occipital regions. Moderate nonspecific diffuse or multifocal cerebral dysfunction.  No seizure seen    Head CT (01/23/20) No evidence of acute intracranial pathology. Chronic bilateral MCA territory infarcts.    CTA head/Neck (01/23/20) CTA neck: No large vessel occlusion or major stenosis.    CTA head: No large vessel occlusion or major stenosis. 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 No prevertebral soft tissue swelling. Degenerative changes

## 2020-01-25 NOTE — EEG REPORT - NS EEG TEXT BOX
[facility]   COMPREHENSIVE EPILEPSY CENTER   REPORT OF CONTINUOUS VIDEO EEG     [facility]: [address] [place] [address] [address], [place] [address], Ph#: [phone]  [facility]: [id number], [place] [address], Ph#: [phone]  [facility]: [address] [place] [address], Ph#: [phone]    Patient Name: VERA JESUS  Age and : 89y ([birth year])  MRN #: [id number]  Location: [place] [address]  Referring Physician: Faustino Mead    Study Date: 20 13:43  End Date: 20 08:00    _____________________________________________________________  TECHNICAL INFORMATION    Placement and Labeling of Electrodes:  The EEG was performed utilizing 20 channels referential EEG connections (coronal over temporal over parasagittal montage) using all standard 10-20 electrode placements with EKG.  Recording was at a sampling rate of 256 samples per second per channel.  Time synchronized digital video recording was done simultaneously with EEG recording.  A low light infrared camera was used for low light recording.  Tim and seizure detection algorithms were utilized.    _____________________________________________________________  HISTORY    Patient is a 89y old  Female who presents with a chief complaint of stroke (2020 12:19)      PERTINENT MEDICATION:  valproate sodium IVPB 500 milliGRAM(s) IV Intermittent two times a day    _____________________________________________________________  STUDY INTERPRETATION    Findings: The background was continuous, spontaneously variable and reactive. No posterior dominant rhythm seen.    Background Slowing:  Diffuse theta and polymorphic delta slowing.    Focal Slowing:   None were present.    Sleep Background:  Drowsiness was characterized by fragmentation, attenuation, and slowing of the background activity.    Stage II sleep transients were not recorded.    Other Non-Epileptiform Findings:  None were present.    Interictal Epileptiform Activity:   - Frequent to rare spikes in the right hemisphere, located in the temporal (shifting max F8/T8, T8/P8), posterior quadrant (P4/O2/T8/P8), temporooccipital (max P8/O2), frontotemporal (max F4/T8), and broadly across entire right hemisphere that all improved 2-3 hours into of the study, becoming rare.   - Occasional to frequent synchronous bifrontal spike and slow wave complexes (max Fp1, F4).    Events:  Clinical events: None recorded.  Seizures: None recorded.    Activation Procedures:   Hyperventilation was not performed.    Photic stimulation was not performed.     Artifacts:  Intermittent myogenic and movement artifacts were noted.    ECG:  The heart rate on single channel ECG was predominantly between  BPM.    _____________________________________________________________  EEG SUMMARY/CLASSIFICATION    Abnormal EEG in the awake, drowsy states.  - Frequent to rare spikes in the right hemisphere, located in the temporal (shifting max F8/T8, T8/P8), posterior quadrant (P4/O2/T8/P8), temporooccipital (max P8/O2), frontotemporal (max F4/T8), and broadly across entire right hemisphere that all improved 2-3 hours into of the study, becoming rare.   - Occasional to frequent synchronous bifrontal spike and slow wave complexes (max Fp1, F4) also improved 2-3 hours into the study.  - Moderate generalized slowing.    _____________________________________________________________  EEG IMPRESSION/CLINICAL CORRELATE    Abnormal EEG study.  1. Potential epileptogenic foci in the left frontal, right frontal, right temporal, right parietal and right occipital regions.  2. Moderate nonspecific diffuse or multifocal cerebral dysfunction.   3. No seizure seen.    _____________________________________________________________    Kristen Pool DO  Epilepsy Fellow, [facility] Epilepsy [facility]   COMPREHENSIVE EPILEPSY CENTER   REPORT OF CONTINUOUS VIDEO EEG     [facility]: [address] [place] [address] [address], [place] [address], Ph#: [phone]  [facility]: [id number], [place] [address], Ph#: [phone]  [facility]: [address] [place] [address], Ph#: [phone]    Patient Name: VERA JESUS  Age and : 89y (28)  MRN #: [id number]  Location: [place] [address]  Referring Physician: Faustino Mead    Study Date: 20 13:43  End Date: 20 08:00    _____________________________________________________________  TECHNICAL INFORMATION    Placement and Labeling of Electrodes:  The EEG was performed utilizing 20 channels referential EEG connections (coronal over temporal over parasagittal montage) using all standard 10-20 electrode placements with EKG.  Recording was at a sampling rate of 256 samples per second per channel.  Time synchronized digital video recording was done simultaneously with EEG recording.  A low light infrared camera was used for low light recording.  Tim and seizure detection algorithms were utilized.    _____________________________________________________________  HISTORY    Patient is a 89y old  Female who presents with a chief complaint of stroke (2020 12:19)      PERTINENT MEDICATION:  valproate sodium IVPB 500 milliGRAM(s) IV Intermittent two times a day    _____________________________________________________________  STUDY INTERPRETATION    Findings: The background was continuous, spontaneously variable and reactive. No posterior dominant rhythm seen.    Background Slowing:  Diffuse asynchronous multifocal theta and polymorphic delta slowing.  Slowing more prominent at times over the left hemisphere    Sleep Background:  Drowsiness was characterized by fragmentation, attenuation, and slowing of the background activity.    Stage II sleep transients were not recorded.    Interictal Epileptiform Activity:   - Frequent multifocal spikes in the right hemisphere, max T8 P4, at times F8 F4, at times more broadly distributed  - Occasional synchronous bifrontal spike and slow wave complexes (max Fp1, Fp2 F4).  less frequent over night    Events:  Clinical events: None recorded.  Seizures: None recorded.    Activation Procedures:   Hyperventilation was not performed.    Photic stimulation was not performed.     Artifacts:  Intermittent myogenic and movement artifacts were noted.    ECG:  The heart rate on single channel ECG was predominantly between  BPM.    _____________________________________________________________  EEG SUMMARY/CLASSIFICATION    Abnormal EEG in the awake, drowsy states.  - Frequent multifocal spikes in the right hemisphere, max parietotemporally or frontotemporally, at times more broadly distributed  -Occasional synchronous bifrontal spike and slow wave complexes (max Fp1, Fp2 F4).  less frequent over night  - Diffuse asynchronous multifocal theta and polymorphic delta slowing, more prominent over the left at times.  _____________________________________________________________  EEG IMPRESSION/CLINICAL CORRELATE    Abnormal EEG study.  1. Potential epileptogenic foci in the bilateral frontal, right frontal, temporal, and parietal regions.  Some improvement in spike burden over course of recording (less frequent)  2. Moderate nonspecific diffuse or multifocal cerebral dysfunction.   3. No seizures seen.    _____________________________________________________________    Kristen Pool DO  Epilepsy Fellow, [facility]    MD JANESSA Mae  Director, Continuous EEG Monitoring Service, [facility]   Epilepsy Fellowship   , Department of Neurology, [facility]

## 2020-01-25 NOTE — PROGRESS NOTE ADULT - ASSESSMENT
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    developed flash pulmonary edema/ hypertensive emergency, had  hypoxemia last night 2/2 flash pulmonary edema. has had cta neck as well.  now with ROMERO/chf       1- ROMERO   2- pulm edema   3- HTN urgency/emergency o/n      romero in setting of iv contrast vs due to diuretics and hypertensive emergency  s/p lasix   trend cr  cr seems to be reaching a plateau  cardene drip   labetalol iv prn     iv lasix as needed  d/w pt son at bedside

## 2020-01-25 NOTE — PROGRESS NOTE ADULT - SUBJECTIVE AND OBJECTIVE BOX
[facility]   [phone]  RENAL FOLLOW UP NOTE  --------------------------------------------------------------------------------  Chief Complaint:    24 hour events/subjective:    confused and restless     PAST HISTORY  --------------------------------------------------------------------------------  No significant changes to PMH, PSH, FHx, SHx, unless otherwise noted    ALLERGIES & MEDICATIONS  --------------------------------------------------------------------------------  Allergies    Allergy Status Unknown    Intolerances      Standing Inpatient Medications  ARIPiprazole 2 milliGRAM(s) Oral daily  aspirin Suppository 300 milliGRAM(s) Rectal daily  atorvastatin 80 milliGRAM(s) Oral at bedtime  enoxaparin Injectable 30 milliGRAM(s) SubCutaneous daily  ergocalciferol 04185 Unit(s) Oral every week  levothyroxine Injectable 62.5 MICROGram(s) IV Push at bedtime  niCARdipine Infusion 5 mG/Hr IV Continuous <Continuous>  sodium chloride 0.9%. 1000 milliLiter(s) IV Continuous <Continuous>  valproate sodium IVPB 500 milliGRAM(s) IV Intermittent two times a day    PRN Inpatient Medications  labetalol Injectable 10 milliGRAM(s) IV Push three times a day PRN  levalbuterol Inhalation 0.63 milliGRAM(s) Inhalation every 6 hours PRN      REVIEW OF SYSTEMS  --------------------------------------------------------------------------------  pt unable     VITALS/PHYSICAL EXAM  --------------------------------------------------------------------------------  T(C): 36.9 (01-25-20 @ 11:00), Max: 36.9 (01-25-20 @ 11:00)  HR: 87 (01-25-20 @ 14:00) (58 - 96)  BP: 158/131 (01-25-20 @ 14:00) (137/92 - 203/135)  RR: 21 (01-25-20 @ 14:00) (14 - 21)  SpO2: 96% (01-25-20 @ 14:00) (95% - 100%)  Wt(kg): --        01-24-20 @ 07:01  -  01-25-20 @ 07:00  --------------------------------------------------------  IN: 250 mL / OUT: 1450 mL / NET: -1200 mL    01-25-20 @ 07:01  -  01-25-20 @ 16:07  --------------------------------------------------------  IN: 250 mL / OUT: 650 mL / NET: -400 mL      Physical Exam:  	  	Gen: Non toxic but restless  on 1:1 Observation when seen   	no jvd , supple neck,   	Pulm: decrease bs  no rales or ronchi or wheezing  	CV: RRR, S1S2; no rub  	Abd: +BS, soft, nontender/nondistended  	: No  distended bladder palp   	UE: Warm, no cyanosis  no clubbing,  no edema  	LE: Warm, no cyanosis  no clubbing, no edema  	Neuro:  non communicative   	Skin: Warm, no decrease skin turgor         LABS/STUDIES  --------------------------------------------------------------------------------              10.9   11.99 >-----------<  164      [01-25-20 @ 06:24]              34.2     139  |  98  |  63  ----------------------------<  93      [01-25-20 @ 06:24]  3.8   |  22  |  1.67        Ca     9.0     [01-25-20 @ 06:24]            Creatinine Trend:  SCr 1.67 [01-25 @ 06:24]  SCr 1.57 [01-24 @ 04:52]  SCr 1.29 [01-23 @ 10:25]              Urinalysis - [01-23-20 @ 16:48]      Color Light Yellow / Appearance Clear / SG 1.030 / pH 6.0      Gluc Negative / Ketone Negative  / Bili Negative / Urobili Negative       Blood Small / Protein Trace / Leuk Est Negative / Nitrite Negative      RBC 15 / WBC 0 / Hyaline 0 / Gran  / Sq Epi  / Non Sq Epi 0 / Bacteria Negative      HbA1c 5.2      [01-23-20 @ 21:03]  TSH 1.80      [01-23-20 @ 21:21]  Lipid: chol 116, TG 91, HDL 58, LDL 40      [01-23-20 @ 21:22]

## 2020-01-25 NOTE — PROGRESS NOTE ADULT - ASSESSMENT
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 On 1/23/20 presents to the ED with unresponsiveness and decreased verbal output.   Impression: Pt with decreased verbal output. Etiology unclear possible cerebrovascular event vs seizures    NEURO: Continue close monitoring for neurologic deterioration, VEEG shows Potential epileptogenic foci in the left frontal, right frontal, right temporal, right parietal and right occipital regions., will continue with Depacon 500mg BID,  permissive HTN with slow titration to normotensive, CTA shows incidental 1 cm saccular L ICA aneurym: as per neurosurgery eval: no acute NSX intervention at this time, titrate statin to LDL goal less than 70, unable to tolerate MRI, will obtain repeat CT head. Pending Physical therapy/OT/Speech eval/treatment. Pt made DNR    ANTITHROMBOTIC THERAPY: ASA [place] for stroke prevention    PULMONARY: CXR (01/23/20) clear, protecting airway, saturating well, no wheezing. On 01/23/20 while having CTA she developed expiratory wheezing and inspiratory stridor. Was not able to tolerate laying flat for the MRI. Was noted to become hypoxemic, Pulm US revealed B lines c/w acute pulmonary edema. Pt developed flash pulmonary edema, had HTN emergency, IV Lasix and labetalol provided. Dr Oshea (Pulmonary) consult appreciated. Dr Arriaga (Medicine) consult appreciated    CARDIOVASCULAR: check TTE, cardiac monitoring rate control                             SBP goal: 120-160mmHg    GASTROINTESTINAL: Dysphagia screen failed, Pending S/S eval     Diet: NPO    RENAL: ROMERO: BUN/Cr 63/1.67 in setting of IV contrast vs diuretics and HTN emergency, will continue to trend BUC/Cr, and provide lasix PRN good urine output, Dr Pool (Nephro) consult appreciated      Na Goal: Greater than 135     Siddiqi: N    HEMATOLOGY: H/H stable, Platelets normal      DVT ppx: LMWH     ID: afebrile in am, febrile on 01/24/20 T max 38.3,  leukocytosis, UA: negative, Urine culture sent, pending results    OTHER:  Overnight with confusion and agitation requiring 1:1.    DISPOSITION: Rehab or home depending on PT eval once stable and workup is complete    CORE MEASURES:        Admission NIHSS: 17     TPA: NO      LDL/HDL: 40/58     Depression Screen: NA     Statin Therapy: Once PO access obtained     Dysphagia Screen:  FAIL     Smoking NO      Afib YES      Stroke Education  YES    Obtain screening ultrasound in patients who meet 1 or more of the following criteria as patient is high risk for DVT/PE upon admission:   [] History of DVT/PE  []Hypercoagulable states (Factor V Leiden, Cancer, OCP, etc. 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 On 1/23/20 presents to the ED with unresponsiveness and decreased verbal output. Pt febrile with leukocytosis. EEG shows potential epileptogenic foci in the left frontal, right frontal, right temporal, right parietal and right occipital regions.    Impression: Pt with no verbal output, moving extremities against gravity. Etiology unclear, possible metabolic encephalopathy from occult infectious process vs cerebrovascular event vs seizures    NEURO: Pt agitated overnight, received zyprexa in am, sleepy during examination. Continue close monitoring for neurologic deterioration, VEEG shows Potential epileptogenic foci in the left frontal, right frontal, right temporal, right parietal and right occipital regions., will continue with Depacon 500mg BID,  permissive HTN with slow titration to normotensive, CTA shows incidental 1 cm saccular L ICA aneurym: as per neurosurgery eval: no acute NSX intervention at this time, titrate statin to LDL goal less than 70, unable to tolerate MRI, will obtain repeat CT head. Pending Physical therapy/OT/Speech eval/treatment. Pt made DNR. Plan is to transfer pt to medicine service once bed is available, Dr Arriaga (Medicine) made aware, consult appreciated    ANTITHROMBOTIC THERAPY: ASA [place] for stroke prevention    PULMONARY: CXR (01/23/20) clear, protecting airway, saturating well, no wheezing. On 01/23/20 while having CTA she developed expiratory wheezing and inspiratory stridor. Was not able to tolerate laying flat for the MRI. Was noted to become hypoxemic, Pulm US revealed B lines c/w acute pulmonary edema. Pt developed flash pulmonary edema, had HTN emergency, IV Lasix and labetalol provided. Dr Oshea (Pulmonary) consult appreciated. Dr Arriaga (Medicine) consult appreciated    CARDIOVASCULAR: check TTE, cardiac monitoring rate control                             SBP goal: 120-180mmHg    GASTROINTESTINAL: Dysphagia screen failed, Pending S/S eval     Diet: NPO    RENAL: ROMERO: BUN/Cr 63/1.67 in setting of IV contrast vs diuretics and HTN emergency, will continue to trend BUC/Cr, and provide lasix PRN good urine output, Dr Pool (Nephro) consult appreciated      Na Goal: Greater than 135     Siddiqi: N    HEMATOLOGY: H/H stable, Platelets normal      DVT ppx: LMWH     ID: afebrile in am, febrile on 01/24/20 T max 38.3,  leukocytosis, UA: negative, Urine culture sent, pending results    OTHER:  Overnight with confusion and agitation requiring 1:1. Plan/goals of care discussed with pt's family at bedside    DISPOSITION: Rehab or home depending on PT eval once stable and workup is complete    CORE MEASURES:        Admission NIHSS: 17     TPA: NO      LDL/HDL: 40/58     Depression Screen: NA     Statin Therapy: Once PO access obtained     Dysphagia Screen:  FAIL     Smoking NO      Afib YES      Stroke Education  YES    Obtain screening ultrasound in patients who meet 1 or more of the following criteria as patient is high risk for DVT/PE upon admission:   [] History of DVT/PE  []Hypercoagulable states (Factor V Leiden, Cancer, OCP, etc. )  []Prolonged immobility (hemiplegia/hemiparesis/post operative or any other extended immobilization)   [] Transferred from outside facility (Rehab or Long term care)  [] Age </= to 50

## 2020-01-26 NOTE — SWALLOW BEDSIDE ASSESSMENT ADULT - SWALLOW EVAL: DIAGNOSIS
Patient presents with a severe oropharyngeal dysphagia. Poor awareness of bolus with limited oral response, significantly delayed oral transit versus delayed pharyngeal trigger with wet gurgly vocal/breath sounds prior to and following po trials of conservative textures suggestive of laryngeal penetration/aspiration. Suctioning provided to remove thick secretions from back of tongue and palate prior to po trials. Suctioning provided post trials to clear oral residual.

## 2020-01-26 NOTE — SWALLOW BEDSIDE ASSESSMENT ADULT - ADDITIONAL RECOMMENDATIONS
Palliative consult is pending. If NPO/alternate means is not in keeping with pt/family wishes, would suggest considering ice chips for comfort at this time along with diligent oral care. Would not recommend comfort feeding of conservative textures at this time as there was absent swallow trigger in response to these textures. Improvement in patient status has been noted in the last 24 hrs re: mental status. This service will continue to follow and will re-evaluate as appropriate.

## 2020-01-26 NOTE — CHART NOTE - NSCHARTNOTEFT_GEN_A_CORE
Discussed with medicine team about further hypertensive management as patient is still >190s systolic. Medicine team stated they do not have privileges for nicardipine drip management and would like patient transferred back to neurology stroke care. Discussed case with Dr. Mead who accepted patient transfer. Will start patient on nicardipine infusion and get CTH Non Cont for stability. Will continue to monitor BP and for neurological deterioration.

## 2020-01-26 NOTE — CHART NOTE - NSCHARTNOTEFT_GEN_A_CORE
Was told at change of shift @1900 that patient has been transferred and is under Medicine care. However, was notified by PA/NP at 0230 AM asking for handoff on patient as "were not made aware." Medicine treating SBP 211s, gave IV hydralazine 10 mg with improvement to BP. Patient had CTH Non Cont done at 1050 PM for CT Head Non Cont. Spoke with Radiology for a preliminary read as I noticed a possible hemorrhage on the left. Radiology confirmed with pre-reilly evolving infarct with hemorrhagic transformation no midline shift. Patient is stable and neurologically at baseline. Spoke with NP/PA from Medicine, discussed BP parameter goals. Requested CTH AM for stability. Will speak to neurology team in AM for further management.

## 2020-01-26 NOTE — SWALLOW BEDSIDE ASSESSMENT ADULT - PHARYNGEAL PHASE
Absent trigger of swallow/Wet vocal quality post oral intake Delayed pharyngeal swallow/Wet vocal quality post oral intake

## 2020-01-26 NOTE — CONSULT NOTE ADULT - SUBJECTIVE AND OBJECTIVE BOX
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 She was found to have an acute stroke. She was not given tPA. She is currently unable to communicate, did not pass her swallow test. Per family patient is DNR/DNI. Family is not interested in peg tube and are considering further GOC conversations.      Allergies    Allergy Status Unknown    Intolerances    	    MEDICATIONS:  enoxaparin Injectable 30 milliGRAM(s) SubCutaneous daily  labetalol Injectable 10 milliGRAM(s) IV Push three times a day PRN  niCARdipine Infusion 5 mG/Hr IV Continuous <Continuous>      levalbuterol Inhalation 0.63 milliGRAM(s) Inhalation every 6 hours PRN    ARIPiprazole 2 milliGRAM(s) Oral daily  aspirin Suppository 300 milliGRAM(s) Rectal daily  valproate sodium IVPB 500 milliGRAM(s) IV Intermittent two times a day      atorvastatin 80 milliGRAM(s) Oral at bedtime  levothyroxine Injectable 62.5 MICROGram(s) IV Push at bedtime    ergocalciferol 85689 Unit(s) Oral every week  sodium chloride 0.9%. 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)     REVIEW OF SYSTEMS: unable to obtain      PHYSICAL EXAM:  T(C): 36.6 (01-25-20 @ 20:00), Max: 36.6 (01-25-20 @ 20:00)  HR: 85 (01-26-20 @ 11:00) (66 - 105)  BP: 129/65 (01-26-20 @ 11:00) (116/59 - 214/93)  RR: 13 (01-26-20 @ 11:00) (10 - 23)  SpO2: 97% (01-26-20 @ 11:00) (93% - 100%)  Wt(kg): --  I&O's Summary    25 Jan 2020 07:01  -  26 Jan 2020 07:00  --------------------------------------------------------  IN: 250 mL / OUT: 1875 mL / NET: -1625 mL        Appearance: Confused, aphasic  HEENT:   Normal oral mucosa  Cardiovascular: Normal S1 S2, No JVD, No murmurs, No edema  Respiratory: Lungs clear to auscultation anteriorly  Gastrointestinal:  Soft, Non-tender, + BS	  Skin: No rashes, No ecchymoses, No cyanosis	  Neurologic: unable to complete as patient cannot follow directions    LABS:	 	    CBC Full  -  ( 26 Jan 2020 06:38 )  WBC Count : 8.75 K/uL  Hemoglobin : 11.3 g/dL  Hematocrit : 35.7 %  Platelet Count - Automated : 147 K/uL  Mean Cell Volume : 89.0 fl  Mean Cell Hemoglobin : 28.2 pg  Mean Cell Hemoglobin Concentration : 31.7 gm/dL  Auto Neutrophil # : x  Auto Lymphocyte # : x  Auto Monocyte # : x  Auto Eosinophil # : x  Auto Basophil # : x  Auto Neutrophil % : x  Auto Lymphocyte % : x  Auto Monocyte % : x  Auto Eosinophil % : x  Auto Basophil % : x    01-26    142  |  104  |  52<H>  ----------------------------<  89  3.6   |  22  |  1.21  01-25    139  |  98  |  63<H>  ----------------------------<  93  3.8   |  22  |  1.67<H>    Ca    9.4      26 Jan 2020 06:38  Ca    9.0      25 Jan 2020 06:24  	  Tele: afib , PVCs    ASSESSMENT/PLAN: 	  Patient is a 89 year old female with PMHx of HTN, dementia, DM, prior stroke in 2018 (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    Afib  - rate controlled  - no AC given co-morbidities and hemorrhagic conversion of recent stroke  - cont to monitor on tele    Elevated BP  - unable to take PO due to recent stroke, family does not want peg tube  - c/w cardene gtt for now  - pending GOC conversation w/ family      Clarisse Scott MD  Cardiology Fellow   [phone], M-F 7:30A-5P    All Cardiology service information can be found on [email or web address], password: [email or web address]. 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 She was found to have an acute stroke. She was not given tPA. She is currently unable to communicate, did not pass her swallow test. Per family patient is DNR/DNI. Family is not interested in peg tube and are considering further GOC conversations.      Allergies    Allergy Status Unknown    Intolerances    	    MEDICATIONS:  enoxaparin Injectable 30 milliGRAM(s) SubCutaneous daily  labetalol Injectable 10 milliGRAM(s) IV Push three times a day PRN  niCARdipine Infusion 5 mG/Hr IV Continuous <Continuous>      levalbuterol Inhalation 0.63 milliGRAM(s) Inhalation every 6 hours PRN    ARIPiprazole 2 milliGRAM(s) Oral daily  aspirin Suppository 300 milliGRAM(s) Rectal daily  valproate sodium IVPB 500 milliGRAM(s) IV Intermittent two times a day      atorvastatin 80 milliGRAM(s) Oral at bedtime  levothyroxine Injectable 62.5 MICROGram(s) IV Push at bedtime    ergocalciferol 75485 Unit(s) Oral every week  sodium chloride 0.9%. 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)     REVIEW OF SYSTEMS: unable to obtain      PHYSICAL EXAM:  T(C): 36.6 (01-25-20 @ 20:00), Max: 36.6 (01-25-20 @ 20:00)  HR: 85 (01-26-20 @ 11:00) (66 - 105)  BP: 129/65 (01-26-20 @ 11:00) (116/59 - 214/93)  RR: 13 (01-26-20 @ 11:00) (10 - 23)  SpO2: 97% (01-26-20 @ 11:00) (93% - 100%)  Wt(kg): --  I&O's Summary    25 Jan 2020 07:01  -  26 Jan 2020 07:00  --------------------------------------------------------  IN: 250 mL / OUT: 1875 mL / NET: -1625 mL        Appearance: Confused, aphasic  HEENT:   Normal oral mucosa  Cardiovascular: Normal S1 S2, No JVD, No murmurs, No edema  Respiratory: Lungs clear to auscultation anteriorly  Gastrointestinal:  Soft, Non-tender, + BS	  Skin: No rashes, No ecchymoses, No cyanosis	  Neurologic: unable to complete as patient cannot follow directions    LABS:	 	    CBC Full  -  ( 26 Jan 2020 06:38 )  WBC Count : 8.75 K/uL  Hemoglobin : 11.3 g/dL  Hematocrit : 35.7 %  Platelet Count - Automated : 147 K/uL  Mean Cell Volume : 89.0 fl  Mean Cell Hemoglobin : 28.2 pg  Mean Cell Hemoglobin Concentration : 31.7 gm/dL  Auto Neutrophil # : x  Auto Lymphocyte # : x  Auto Monocyte # : x  Auto Eosinophil # : x  Auto Basophil # : x  Auto Neutrophil % : x  Auto Lymphocyte % : x  Auto Monocyte % : x  Auto Eosinophil % : x  Auto Basophil % : x    01-26    142  |  104  |  52<H>  ----------------------------<  89  3.6   |  22  |  1.21  01-25    139  |  98  |  63<H>  ----------------------------<  93  3.8   |  22  |  1.67<H>    Ca    9.4      26 Jan 2020 06:38  Ca    9.0      25 Jan 2020 06:24  	  Tele: afib , PVCs    ASSESSMENT/PLAN: 	  Patient is a 89 year old female with PMHx of HTN, dementia, DM, prior stroke in 2018 (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    Afib  - rate controlled  - no AC given co-morbidities and hemorrhagic conversion of recent stroke  - cont to monitor on tele    Elevated BP  - unable to take PO due to recent stroke, family does not want peg tube  - c/w cardene gtt for now, maintain -160mmHg  - pending GOC conversation w/ family      Clarisse Scott MD  Cardiology Fellow   [phone], M-F 7:30A-5P    All Cardiology service information can be found on [email or web address], password: [email or web address].

## 2020-01-26 NOTE — SWALLOW BEDSIDE ASSESSMENT ADULT - ORAL PREPARATORY PHASE
Anterior loss of bolus/Bolus falls into right lateral sulci/Lateral loss of bolus/reduced awareness of bolus in oral cavity; vocalizing with po in mouth-> anterior loss of bolus/Reduced oral grading/Bolus falls into anterior sulcus improved oral response relative to honey tsp/Reduced oral grading

## 2020-01-26 NOTE — CHART NOTE - NSCHARTNOTEFT_GEN_A_CORE
Received patient handoff at 2:30AM from neurology  RN to PA- Pt is hypertensive 211/95  Pt seen at bedside in no acute distress        Vital Signs Last 24 Hrs  T(C): 36.6 (25 Jan 2020 20:00), Max: 36.9 (25 Jan 2020 11:00)  T(F): 97.8 (25 Jan 2020 20:00), Max: 98.4 (25 Jan 2020 11:00)  HR: 75 (26 Jan 2020 01:00) (69 - 96)  BP: 176/86 (26 Jan 2020 01:00) (117/41 - 203/135)  BP(mean): 111 (26 Jan 2020 01:00) (63 - 156)  RR: 18 (26 Jan 2020 01:00) (14 - 21)  SpO2: 95% (26 Jan 2020 01:00) (95% - 100%)      Labs:                          10.9   11.99 )-----------( 164      ( 25 Jan 2020 06:24 )             34.2     01-25    139  |  98  |  63<H>  ----------------------------<  93  3.8   |  22  |  1.67<H>    Ca    9.0      25 Jan 2020 06:24          Radiology:    Physical Exam:  General: WN/WD NAD  Neurology: A&Ox3, nonfocal, PISANO x 4  Head:  Normocephalic, atraumatic  Respiratory: CTA B/L  CV: RRR, S1S2, no murmur  Abdominal: Soft, NT, ND no palpable mass  MSK: No edema, + peripheral pulses, FROM all 4 extremity    Assessment & Plan:  HPI:  Patient is a 89 year old female with PMHx of HTN, dementia, DM, prior stroke in 2018 (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 LKN after interviewing several family members in person and over the phone is 5:00 PM on 1/22/2020. At baseline she ambulates with a walker and sometimes needs 1 person assist. At 5:00 PM on 1/22/2020, she was seen ambulating at her baseline and speaking, and then acutely became weaker (was not even able to stand up from couch) and became confused, she was asking odd questions and was disoriented. The morning of presentation, she was also noted to have a R facial droop, and family called EMS after she was seen ambulating to the bathroom (unclear if she was weak then), and then she was found down in the bathroom and was unable to get up. Fall itself was not witnessed. She was also found in the ED to be in Afib.   Per family patient is DNR/DNI. Given LKN > 4 hrs prior, was excluded from tPa  She has no LVO, is not an endovascular candidate.   NIHSS 17, MRS 4 (23 Jan 2020 11:37)    >  >  >  >        Follow up with Attending in AM. Received patient handoff at 2:30AM from neurology  RN to PA- Pt is hypertensive 211/95  Pt seen at bedside, awake, mute, unable to follow commands        Vital Signs Last 24 Hrs  T(C): 36.6 (25 Jan 2020 20:00), Max: 36.9 (25 Jan 2020 11:00)  T(F): 97.8 (25 Jan 2020 20:00), Max: 98.4 (25 Jan 2020 11:00)  HR: 75 (26 Jan 2020 01:00) (69 - 96)  BP: 176/86 (26 Jan 2020 01:00) (117/41 - 203/135)  BP(mean): 111 (26 Jan 2020 01:00) (63 - 156)  RR: 18 (26 Jan 2020 01:00) (14 - 21)  SpO2: 95% (26 Jan 2020 01:00) (95% - 100%)      Labs:                          10.9   11.99 )-----------( 164      ( 25 Jan 2020 06:24 )             34.2     01-25    139  |  98  |  63<H>  ----------------------------<  93  3.8   |  22  |  1.67<H>    Ca    9.0      25 Jan 2020 06:24          Radiology:  < from: Xray Chest 1 View- PORTABLE-Routine (01.25.20 @ 09:14) >    IMPRESSION:    Clear lungs. No significant pleural effusion or pneumothorax.    The cardiac silhouette remains enlarged.    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    Plan:   Hypertensive emergency  - already received IVP labetalol prior to handoff at 12AM  - IVP hydralazine 10mg stat  - f/u BP in 1/2 hour   - may consider RRT if BP not under control  - c/w monitoring BP overnight  - f/u with day team in [facility] [place]  [id number]

## 2020-01-26 NOTE — SWALLOW BEDSIDE ASSESSMENT ADULT - ORAL PHASE
Delayed oral transit time/Stasis in lateral sulci/Decreased anterior-posterior movement of the bolus/Stasis in anterior sulcus Delayed oral transit time

## 2020-01-26 NOTE — PROGRESS NOTE ADULT - ASSESSMENT
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 On 1/23/20 presents to the ED with unresponsiveness and decreased verbal output. Pt febrile with leukocytosis. EEG shows potential epileptogenic foci in the left frontal, right frontal, right temporal, right parietal and right occipital regions.    Impression: Pt with no verbal output, moving extremities against gravity. Etiology unclear, possible metabolic encephalopathy from occult infectious process vs cerebrovascular event vs seizures    NEURO: Continue close monitoring for neurologic deterioration, VEEG shows Potential epileptogenic foci in the left frontal, right frontal, right temporal, right parietal and right occipital regions., will continue with Depacon 500mg BID,  SBP 1120-160mmHg with slow titration to normotensive, CTA shows incidental 1 cm saccular L ICA aneurym: as per neurosurgery eval: no acute NSX intervention at this time, titrate statin to LDL goal less than 70, unable to tolerate MRI, will obtain repeat CT head. Pending Physical therapy/OT/Speech eval/treatment. Pt made DNR. Plan is to transfer pt to medicine service once bed is available, Dr Arriaga (Medicine) made aware, consult appreciated    ANTITHROMBOTIC THERAPY: ASA [place] for stroke prevention    PULMONARY: CXR (01/25/20) clear, protecting airway, saturating well, no wheezing. On 01/23/20 while having CTA she developed expiratory wheezing and inspiratory stridor. Was not able to tolerate laying flat for the MRI. Was noted to become hypoxemic, Pulm US revealed B lines c/w acute pulmonary edema. Pt developed flash pulmonary edema, had HTN emergency, IV Lasix and labetalol provided. Dr Oshea (Pulmonary) consult appreciated. Dr Arriaga (Medicine) consult appreciated    CARDIOVASCULAR: check TTE, cardiac monitoring rate control. On cardene drip for BP management                             SBP goal: 120-160mmHg    GASTROINTESTINAL: Dysphagia screen failed, Pending S/S eval     Diet: NPO    RENAL: BUN/Cr 52/1.21, trending down. ROMERO: In setting of IV contrast vs diuretics and HTN emergency, will continue to trend BUC/Cr, and provide lasix PRN good urine output, Dr Pool (Nephro) consult appreciated      Na Goal: Greater than 135     Siddiqi: N    HEMATOLOGY: H/H stable, Platelets 147, thrombocytopenia, will continue to monitor      DVT ppx: LMWH     ID: afebrile in am, febrile on 01/24/20 T max 38.3,  leukocytosis, UA: negative, Blood culture: NGTD X2. Urine culture sent, pending results    OTHER:  Overnight with confusion and agitation requiring 1:1. Plan/goals of care discussed with pt's family at bedside    DISPOSITION: Rehab or home depending on PT eval once stable and workup is complete    CORE MEASURES:        Admission NIHSS: 17     TPA: NO      LDL/HDL: 40/58     Depression Screen: NA     Statin Therapy: Once PO access obtained     Dysphagia Screen:  FAIL     Smoking NO      Afib YES      Stroke Education  YES    Obtain screening ultrasound in patients who meet 1 or more of the following criteria as patient is high risk for DVT/PE upon admission:   [] History of DVT/PE  []Hypercoagulable states (Factor V Leiden, Cancer, OCP, etc. 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 On 1/23/20 presents to the ED with unresponsiveness and decreased verbal output. Pt febrile with leukocytosis. EEG shows potential epileptogenic foci in the left frontal, right frontal, right temporal, right parietal and right occipital regions. Head CT shows left MCA infarct, with foci of hemorrhagic transformation.    Impression: LMCA ischemic infarct with foci of hemorrhagic transformation. Etiology likely cardioembolic in the setting of Afib    NEURO: Continue close monitoring for neurologic deterioration, VEEG shows Potential epileptogenic foci in the left frontal, right frontal, right temporal, right parietal and right occipital regions., will continue with Depacon 500mg BID,  SBP 1120-160mmHg with slow titration to normotensive, CTA shows incidental 1 cm saccular L ICA aneurysm: as per neurosurgery eval: no acute NSX intervention at this time, titrate statin to LDL goal less than 70, unable to tolerate MRI, will obtain repeat CT head. Pending Physical therapy/OT/Speech eval/treatment. Pt made DNR. Plan is to transfer pt to medicine service once bed is available, Dr Arriaga (Medicine) made aware, consult appreciated. Will contact palliative team to discuss goals of care    ANTITHROMBOTIC THERAPY: ASA [place] for stroke prevention    PULMONARY: CXR (01/25/20) clear, protecting airway, saturating well, no wheezing. On 01/23/20 while having CTA she developed expiratory wheezing and inspiratory stridor. Was not able to tolerate laying flat for the MRI. Was noted to become hypoxemic, Pulm US revealed B lines c/w acute pulmonary edema. Pt developed flash pulmonary edema, had HTN emergency, IV Lasix and labetalol provided. Dr Oshea (Pulmonary) consult appreciated. Dr Arriaga (Medicine) consult appreciated    CARDIOVASCULAR: check TTE, cardiac monitoring rate control. On Cardene drip for BP management. Contacted Dr Guille River (pt's private cardiologist) for further  recommendations                             SBP goal: 120-160mmHg    GASTROINTESTINAL: Dysphagia screen failed, Pending S/S eval once pt able to participate. Will contact Palliative team to discuss goals of care.     Diet: NPO    RENAL: BUN/Cr 52/1.21, trending down. ROMERO: In setting of IV contrast vs diuretics and HTN emergency, will continue to trend BUC/Cr, and provide lasix PRN good urine output, Dr Pool (Nephro) consult appreciated      Na Goal: Greater than 135     Siddiqi: N    HEMATOLOGY: H/H stable, Platelets 147, thrombocytopenia, will continue to monitor      DVT ppx: LMWH     ID: afebrile in am, febrile on 01/24/20 T max 38.3,  leukocytosis, UA: negative, Blood culture: NGTD X2. Urine culture sent, pending results    OTHER:  Overnight with confusion and agitation requiring 1:1. Plan/goals of care discussed with pt's family at bedside    DISPOSITION: Rehab or home depending on PT eval once stable and workup is complete    CORE MEASURES:        Admission NIHSS: 17     TPA: NO      LDL/HDL: 40/58     Depression Screen: NA     Statin Therapy: Once PO access obtained     Dysphagia Screen:  FAIL     Smoking NO      Afib YES      Stroke Education  YES    Obtain screening ultrasound in patients who meet 1 or more of the following criteria as patient is high risk for DVT/PE upon admission:   [] History of DVT/PE  []Hypercoagulable states (Factor V Leiden, Cancer, OCP, etc. )  []Prolonged immobility (hemiplegia/hemiparesis/post operative or any other extended immobilization)   [] Transferred from outside facility (Rehab or Long term care)  [] Age </= to 50

## 2020-01-26 NOTE — SWALLOW BEDSIDE ASSESSMENT ADULT - COMMENTS
Given LKN > 4 hrs prior, was excluded from tPa. She has no LVO, is not an endovascular candidate. NIHSS 17, MRS 4. Impression: mixed receptive/expressive aphasia likely due to cardioembolism from Afib. CTH: No evidence of acute intracranial pathology. Chronic bilateral MCA territory infarcts.  Bedside swallow evaluation attempted however deferred 1/25 as patient was restless, agitated, lethargic. suctioning provided

## 2020-01-26 NOTE — PROGRESS NOTE ADULT - SUBJECTIVE AND OBJECTIVE BOX
THE PATIENT WAS SEEN AND EXAMINED BY ME WITH THE HOUSESTAFF AND STROKE TEAM DURING MORNING ROUNDS.   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 LKN after interviewing several family members in person and over the phone is 5:00 PM on 1/22/2020. At baseline she ambulates with a walker and sometimes needs 1 person assist. At 5:00 PM on 1/22/2020, she was seen ambulating at her baseline and speaking, and then acutely became weaker (was not even able to stand up from couch) and became confused, she was asking odd questions and was disoriented. On day of admission she was also noted to have a R facial droop, and family called EMS after she was seen ambulating to the bathroom (unclear if she was weak then), and then she was found down in the bathroom and was unable to get up. Per family patient is DNR/DNI. Given LKN > 4 hrs prior, was excluded from tPa. She has no large vessel occlusion: is not an endovascular candidate. NIHSS 17, MRS 4    SUBJECTIVE: No events overnight.  No new neurologic complaints.      ARIPiprazole 2 milliGRAM(s) Oral daily  aspirin Suppository 300 milliGRAM(s) Rectal daily  atorvastatin 80 milliGRAM(s) Oral at bedtime  enoxaparin Injectable 30 milliGRAM(s) SubCutaneous daily  ergocalciferol 61478 Unit(s) Oral every week  labetalol Injectable 10 milliGRAM(s) IV Push three times a day PRN  levalbuterol Inhalation 0.63 milliGRAM(s) Inhalation every 6 hours PRN  levothyroxine Injectable 62.5 MICROGram(s) IV Push at bedtime  niCARdipine Infusion 5 mG/Hr IV Continuous <Continuous>  sodium chloride 0.9%. 1000 milliLiter(s) IV Continuous <Continuous>  valproate sodium IVPB 500 milliGRAM(s) IV Intermittent two times a day      PHYSICAL EXAM:   Vital Signs Last 24 Hrs  T(C): 36.6 (25 Jan 2020 20:00), Max: 36.9 (25 Jan 2020 11:00)  T(F): 97.8 (25 Jan 2020 20:00), Max: 98.4 (25 Jan 2020 11:00)  HR: 97 (26 Jan 2020 07:30) (68 - 105)  BP: 161/81 (26 Jan 2020 07:30) (117/41 - 214/93)  BP(mean): 106 (26 Jan 2020 07:30) (63 - 156)  RR: 18 (26 Jan 2020 07:30) (10 - 23)  SpO2: 98% (26 Jan 2020 07:30) (93% - 100%)    General: No acute distress  HEENT: EOM intact, B/L no blink to threat  Abdomen: Soft, nontender, nondistended   Extremities: No edema    NEUROLOGICAL EXAM:  Mental status: eyes closed, opens to noxious stimuli, awake, mute, unable to follow commands  Cranial Nerves: Right nasolabial flattening, no nystagmus., B/L no blink to threat, oculocephalic intact  Motor exam: Normal tone, no drift, moves all extremities spontaneously against gravity  Sensation: appears Intact to noxious stimuli  Coordination/ Gait: unable to participate with exam     LABS:                        11.3   8.75  )-----------( 147      ( 26 Jan 2020 06:38 )             35.7    01-26    142  |  104  |  52<H>  ----------------------------<  89  3.6   |  22  |  1.21    Ca    9.4      26 Jan 2020 06:38      Hemoglobin A1C, Whole Blood: 5.2 % (01-23 @ 21:03)      IMAGING: Reviewed by me.     Head CT (01/26/20) Evolving left MCA infarct, more pronounced decreased attenuation, loss of gray-white distinction and left frontal, temporal, and parietal lobes, unchanged foci of hemorrhagic transformation. Redemonstration of remote right cerebral hemisphere infarcts, microvascular disease, lacunar infarcts, no new extra axial collection or midline shift    Head CT (01/25/20) Evolving left MCA infarct with hemorrhagic transformation. Redemonstration remote infarcts, right lateral ventricle ex vacuo enlargement, microvascular disease, lacunar infarcts, volume loss loss, no new midline shift.      EEG (01/24/20) Potential epileptogenic foci in the left frontal, right frontal, right temporal, right parietal and right occipital regions. Moderate nonspecific diffuse or multifocal cerebral dysfunction.  No seizure seen    Head CT (01/23/20) No evidence of acute intracranial pathology. Chronic bilateral MCA territory infarcts.    CTA Head/Neck (01/23/20) CTA neck: No large vessel occlusion or major stenosis.    CTA head: No large vessel occlusion or major stenosis. 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 No prevertebral soft tissue swelling. Degenerative changes THE PATIENT WAS SEEN AND EXAMINED BY ME WITH THE HOUSESTAFF AND STROKE TEAM DURING MORNING ROUNDS.   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 LKN after interviewing several family members in person and over the phone is 5:00 PM on 1/22/2020. At baseline she ambulates with a walker and sometimes needs 1 person assist. At 5:00 PM on 1/22/2020, she was seen ambulating at her baseline and speaking, and then acutely became weaker (was not even able to stand up from couch) and became confused, she was asking odd questions and was disoriented. On day of admission she was also noted to have a R facial droop, and family called EMS after she was seen ambulating to the bathroom (unclear if she was weak then), and then she was found down in the bathroom and was unable to get up. Per family patient is DNR/DNI. Given LKN > 4 hrs prior, was excluded from tPa. She has no large vessel occlusion: is not an endovascular candidate. NIHSS 17, MRS 4    SUBJECTIVE: agitated overnight.  No new neurologic complaints.      ARIPiprazole 2 milliGRAM(s) Oral daily  aspirin Suppository 300 milliGRAM(s) Rectal daily  atorvastatin 80 milliGRAM(s) Oral at bedtime  enoxaparin Injectable 30 milliGRAM(s) SubCutaneous daily  ergocalciferol 57507 Unit(s) Oral every week  labetalol Injectable 10 milliGRAM(s) IV Push three times a day PRN  levalbuterol Inhalation 0.63 milliGRAM(s) Inhalation every 6 hours PRN  levothyroxine Injectable 62.5 MICROGram(s) IV Push at bedtime  niCARdipine Infusion 5 mG/Hr IV Continuous <Continuous>  sodium chloride 0.9%. 1000 milliLiter(s) IV Continuous <Continuous>  valproate sodium IVPB 500 milliGRAM(s) IV Intermittent two times a day      PHYSICAL EXAM:   Vital Signs Last 24 Hrs  T(C): 36.6 (25 Jan 2020 20:00), Max: 36.9 (25 Jan 2020 11:00)  T(F): 97.8 (25 Jan 2020 20:00), Max: 98.4 (25 Jan 2020 11:00)  HR: 97 (26 Jan 2020 07:30) (68 - 105)  BP: 161/81 (26 Jan 2020 07:30) (117/41 - 214/93)  BP(mean): 106 (26 Jan 2020 07:30) (63 - 156)  RR: 18 (26 Jan 2020 07:30) (10 - 23)  SpO2: 98% (26 Jan 2020 07:30) (93% - 100%)    General: No acute distress  HEENT: EOM intact, B/L no blink to threat  Abdomen: Soft, nontender, nondistended   Extremities: No edema    NEUROLOGICAL EXAM:  Mental status: eyes open, awake, mumbles unintelligible sounds, unable to follow commands  Cranial Nerves: Right nasolabial flattening, no nystagmus., B/L no blink to threat, oculocephalic intact  Motor exam: Normal tone, no drift, moves all extremities spontaneously against gravity  Sensation: appears Intact to noxious stimuli  Coordination/ Gait: unable to participate with exam     LABS:                        11.3   8.75  )-----------( 147      ( 26 Jan 2020 06:38 )             35.7    01-26    142  |  104  |  52<H>  ----------------------------<  89  3.6   |  22  |  1.21    Ca    9.4      26 Jan 2020 06:38      Hemoglobin A1C, Whole Blood: 5.2 % (01-23 @ 21:03)      IMAGING: Reviewed by me.     Head CT (01/26/20) Evolving left MCA infarct, more pronounced decreased attenuation, loss of gray-white distinction and left frontal, temporal, and parietal lobes, unchanged foci of hemorrhagic transformation. Redemonstration of remote right cerebral hemisphere infarcts, microvascular disease, lacunar infarcts, no new extra axial collection or midline shift    Head CT (01/25/20) Evolving left MCA infarct with hemorrhagic transformation. Redemonstration remote infarcts, right lateral ventricle ex vacuo enlargement, microvascular disease, lacunar infarcts, volume loss loss, no new midline shift.      EEG (01/24/20) Potential epileptogenic foci in the left frontal, right frontal, right temporal, right parietal and right occipital regions. Moderate nonspecific diffuse or multifocal cerebral dysfunction.  No seizure seen    Head CT (01/23/20) No evidence of acute intracranial pathology. Chronic bilateral MCA territory infarcts.    CTA Head/Neck (01/23/20) CTA neck: No large vessel occlusion or major stenosis.    CTA head: No large vessel occlusion or major stenosis. 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 No prevertebral soft tissue swelling. Degenerative changes

## 2020-01-26 NOTE — PROGRESS NOTE ADULT - SUBJECTIVE AND OBJECTIVE BOX
Patient is a 89y old  Female who presents with a chief complaint of stroke (2020 12:19)      HPI:  Awake, non verbal, slurred speech. Moves all extremities  BP stable on Cardene Infusion  Agitated at times  PAST MEDICAL & SURGICAL HISTORY:      Review of Systems:   CONSTITUTIONAL: No fever, weight loss, or fatigue  EYES: No eye pain, visual disturbances, or discharge  ENMT:  No difficulty hearing, tinnitus, vertigo; No sinus or throat pain  NECK: No pain or stiffness  BREASTS: No pain, masses, or nipple discharge  RESPIRATORY: No cough, wheezing, chills or hemoptysis; No shortness of breath  CARDIOVASCULAR: No chest pain, palpitations, dizziness, or leg swelling  GASTROINTESTINAL: No abdominal or epigastric pain. No nausea, vomiting, or hematemesis; No diarrhea or constipation. No melena or hematochezia.  GENITOURINARY: No dysuria, frequency, hematuria, or incontinence  NEUROLOGICAL: HPI  SKIN: No itching, burning, rashes, or lesions   LYMPH NODES: No enlarged glands  ENDOCRINE: No heat or cold intolerance; No hair loss  MUSCULOSKELETAL: No joint pain or swelling; No muscle, back, or extremity pain  PSYCHIATRIC: No depression, anxiety, mood swings, or difficulty sleeping  HEME/LYMPH: No easy bruising, or bleeding gums  ALLERY AND IMMUNOLOGIC: No hives or eczema    Allergies    Allergy Status Unknown    Intolerances        Social History:     FAMILY HISTORY:      MEDICATIONS  (STANDING):  ARIPiprazole 2 milliGRAM(s) Oral daily  aspirin Suppository 300 milliGRAM(s) Rectal daily  atorvastatin 80 milliGRAM(s) Oral at bedtime  enoxaparin Injectable 30 milliGRAM(s) SubCutaneous daily  ergocalciferol 90058 Unit(s) Oral every week  levothyroxine Injectable 62.5 MICROGram(s) IV Push at bedtime  sodium chloride 0.9%. 1000 milliLiter(s) (50 mL/Hr) IV Continuous <Continuous>  valproate sodium IVPB 500 milliGRAM(s) IV Intermittent two times a day    MEDICATIONS  (PRN):  labetalol Injectable 10 milliGRAM(s) IV Push three times a day PRN SBP > 180  levalbuterol Inhalation 0.63 milliGRAM(s) Inhalation every 6 hours PRN Shortness of breath/Wheezing        CAPILLARY BLOOD GLUCOSE        I&O's Summary    2020 07:01  -  2020 07:00  --------------------------------------------------------  IN: 335 mL / OUT: 2890 mL / NET: -2555 mL        PHYSICAL EXAM:  Vital Signs Last 24 Hrs  T(C): 36.6 (2020 20:00), Max: 38.3 (2020 07:46)  T(F): 97.8 (2020 20:00), Max: 101 (2020 07:46)  HR: 67 (2020 20:00) (62 - 128)  BP: 173/51 (2020 20:00) (137/125 - 195/62)  BP(mean): 87 (2020 20:00) (75 - 184)  RR: 16 (2020 20:00) (13 - 26)  SpO2: 97% (2020 20:00) (91% - 100%)    GENERAL: NAD, well-developed  HEAD:  Atraumatic, Normocephalic  EYES: EOMI, PERRLA, conjunctiva and sclera clear  NECK: Supple, No JVD  CHEST/LUNG: Clear to auscultation bilaterally; No wheeze  HEART: Regular rate and rhythm; No murmurs, rubs, or gallops  ABDOMEN: Soft, Nontender, Nondistended; Bowel sounds present  EXTREMITIES:  2+ Peripheral Pulses, No clubbing, cyanosis, or edema  PSYCH: AAOx3  NEUROLOGY: non-focal  SKIN: No rashes or lesions    LABS:                        10.4   6.40  )-----------( 114      ( 2020 04:52 )             32.9     01-24    139  |  99  |  47<H>  ----------------------------<  153<H>  4.6   |  21<L>  |  1.57<H>    Ca    9.2      2020 04:52    TPro  7.2  /  Alb  3.8  /  TBili  0.6  /  DBili  x   /  AST  16  /  ALT  7<L>  /  AlkPhos  97  01-23    PT/INR - ( 2020 10:25 )   PT: 12.0 sec;   INR: 1.05 ratio         PTT - ( 2020 10:25 )  PTT:30.9 sec      Urinalysis Basic - ( 2020 16:48 )    Color: Light Yellow / Appearance: Clear / S.030 / pH: x  Gluc: x / Ketone: Negative  / Bili: Negative / Urobili: Negative   Blood: x / Protein: Trace / Nitrite: Negative   Leuk Esterase: Negative / RBC: 15 /hpf / WBC 0 /HPF   Sq Epi: x / Non Sq Epi: 0 /hpf / Bacteria: Negative        RADIOLOGY & ADDITIONAL TESTS:    Imaging Personally Reviewed:    Consultant(s) Notes Reviewed:      Care Discussed with Consultants/Other Providers:

## 2020-01-27 NOTE — CONSULT NOTE ADULT - SUBJECTIVE AND OBJECTIVE BOX
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 LKN after interviewing several family members in person and over the phone is 5:00 PM on 1/22/2020. At baseline she ambulates with a walker and sometimes needs 1 person assist. At 5:00 PM on 1/22/2020, she was seen ambulating at her baseline and speaking, and then acutely became weaker (was not even able to stand up from couch) and became confused, she was asking odd questions and was disoriented. The morning of presentation, she was also noted to have a R facial droop, and family called EMS after she was seen ambulating to the bathroom (unclear if she was weak then), and then she was found down in the bathroom and was unable to get up. Fall itself was not witnessed. She was also found in the ED to be in Afib.   Per family patient is DNR/DNI. Given LKN > 4 hrs prior, was excluded from tPa  She has no LVO, is not an endovascular candidate.   NIHSS 17, MRS 4 (23 Jan 2020 11:37)    The patient was seen and evaluated. She was mildly agitated, non verbal, and not able to f/u any commands.     PERTINENT PM/SXH:       FAMILY HISTORY:    ITEMS NOT CHECKED ARE NOT PRESENT    SOCIAL HISTORY:   Significant other/partner[ ]  Children[ ]  [employer]/Spirituality:  Substance hx:  [ ]   Tobacco hx:  [ ]   Alcohol hx: [ ]   Home Opioid hx:  [ ] I-Stop Reference No:  Living Situation: [ x]Home  [ ]Long term care  [ ]Rehab [ ]Other   As per care coordination notes: " Per RN, patient is non-verbal. LMSW met with patients son/emergency contact,  Jeromy Nguyen ([phone]) at bedside to complete assessment. Patient lives  with son and daughter-in-law in a private residence in [place] (3 steps  inside and 14 stairs inside). Per son, patient requires assistance with all  ADLs and ambulates with walker. Patient also has a cane, wheelchair, and shower  chair at home. Per son, someone is home with patient 24/7 to provide care, and  the family also private hires HHA as needed. Per son, patient with HCP.  Patients son provided HCP to this  which LMSW scanned into [facility] and  placed in patients chart. Patients son, Jeromy Nguyen identified as primary  health care agent. Patients daughter in law, Alejandra Nguyen identified as  secondary agent.     Patients discharge plan is unclear and is pending hospital course.  Patient  with [employer]/Medicare insurance.  provided contact information and  assured availability."  ADVANCE DIRECTIVES:    DNR  Yes  MOLST  [ ]  Living Will  [ ]   DECISION MAKER(s):  [x ] Health Care Proxy(s)  [ ] Surrogate(s)  [ ] Guardian           Name(s): Phone Number(s): Jeromy Nguyen ([phone])    BASELINE (I)ADL(s) (prior to admission):  [place]: [ ]Total  [ ] Moderate [x ]Dependent    Allergies    Allergy Status Unknown    Intolerances    MEDICATIONS  (STANDING):  ARIPiprazole 2 milliGRAM(s) Oral daily  aspirin Suppository 300 milliGRAM(s) Rectal daily  atorvastatin 80 milliGRAM(s) Oral at bedtime  enoxaparin Injectable 30 milliGRAM(s) SubCutaneous daily  ergocalciferol 79581 Unit(s) Oral every week  levothyroxine Injectable 62.5 MICROGram(s) IV Push at bedtime  niCARdipine Infusion 5 mG/Hr (25 mL/Hr) IV Continuous <Continuous>  OLANZapine Injectable 5 milliGRAM(s) IntraMuscular once  sodium chloride 0.45%. 1000 milliLiter(s) (75 mL/Hr) IV Continuous <Continuous>  valproate sodium IVPB 500 milliGRAM(s) IV Intermittent two times a day    MEDICATIONS  (PRN):  labetalol Injectable 10 milliGRAM(s) IV Push three times a day PRN SBP > 180  levalbuterol Inhalation 0.63 milliGRAM(s) Inhalation every 6 hours PRN Shortness of breath/Wheezing    PRESENT SYMPTOMS: [x ]Unable to obtain due to poor mentation   Source if other than patient:  [ ]Family   [ ]Team     Pain: [ ]yes [ ]no  QOL impact -   Location -                    Aggravating factors -  Quality -  Radiation -  Timing-  Severity (0-10 scale):  Minimal acceptable level (0-10 scale):     PAIN AD Score:     http://geriatrictoolkit.[place].[email or web address]/cog/painad.pdf (press ctrl +  left click to view)    Dyspnea:                           [ ]Mild [ ]Moderate [ ]Severe  Anxiety:                             [ ]Mild [ ]Moderate [ ]Severe  Fatigue:                             [ ]Mild [ ]Moderate [ ]Severe  Nausea:                             [ ]Mild [ ]Moderate [ ]Severe  Loss of appetite:              [ ]Mild [ ]Moderate [ ]Severe  Constipation:                    [ ]Mild [ ]Moderate [ ]Severe    Other Symptoms:  [ ]All other review of systems negative     Palliative Performance Status Version 2:         %    http://[facility].org/files/news/palliative_performance_scale_ppsv2.pdf  PHYSICAL EXAM:  Vital Signs Last 24 Hrs  T(C): 36.7 (27 Jan 2020 16:42), Max: 37 (27 Jan 2020 07:07)  T(F): 98 (27 Jan 2020 16:42), Max: 98.6 (27 Jan 2020 07:07)  HR: 93 (27 Jan 2020 18:00) (62 - 119)  BP: 171/70 (27 Jan 2020 18:00) (106/52 - 174/78)  BP(mean): 98 (27 Jan 2020 18:00) (13 - 111)  RR: 15 (27 Jan 2020 18:00) (12 - 33)  SpO2: 97% (27 Jan 2020 18:00) (92% - 100%) I&O's Summary    26 Jan 2020 07:01  -  27 Jan 2020 07:00  --------------------------------------------------------  IN: 0 mL / OUT: 500 mL / NET: -500 mL    27 Jan 2020 07:01  -  27 Jan 2020 19:52  --------------------------------------------------------  IN: 0 mL / OUT: 400 mL / NET: -400 mL      GENERAL:  [ ]Alert  [ ]Oriented x   [ ]Lethargic  [ ]Cachexia  [ ]Unarousable  [ ]Verbal  [ ]Non-Verbal  Behavioral:   [ ] Anxiety  [ ] Delirium [ ] Agitation [ ] Other  HEENT:  [ ]Normal   [ ]Dry mouth   [ ]ET Tube/Trach  [ ]Oral lesions  PULMONARY:   [ ]Clear [ ]Tachypnea  [ ]Audible excessive secretions   [ ]Rhonchi        [ ]Right [ ]Left [ ]Bilateral  [ ]Crackles        [ ]Right [ ]Left [ ]Bilateral  [ ]Wheezing     [ ]Right [ ]Left [ ]Bilatera  [ ]Diminished breath sounds [ ]right [ ]left [ ]bilateral  CARDIOVASCULAR:    [ ]Regular [ ]Irregular [ ]Tachy  [ ]Hung [ ]Murmur [ ]Other  GASTROINTESTINAL:  [ ]Soft  [ ]Distended   [ ]+BS  [ ]Non tender [ ]Tender  [ ]PEG [ ]OGT/ NGT  Last BM:     GENITOURINARY:  [ ]Normal [ ] Incontinent   [ ]Oliguria/Anuria   [ ]Siddiqi  MUSCULOSKELETAL:   [ ]Normal   [ ]Weakness  [ ]Bed/Wheelchair bound [ ]Edema  NEUROLOGIC:   [ ]No focal deficits  [ ]Cognitive impairment  [ ]Dysphagia [ ]Dysarthria [ ]Paresis [ ]Other   SKIN:   [ ]Normal    [ ]Rash  [ ]Pressure ulcer(s)       Present on admission [ ]y [ ]n    CRITICAL CARE:  [ ] Shock Present  [ ]Septic [ ]Cardiogenic [ ]Neurologic [ ]Hypovolemic  [ ]  Vasopressors [ ]  Inotropes   [ ]Respiratory failure present [ ]Mechanical ventilation [ ]Non-invasive ventilatory support [ ]High flow  [ ]Acute  [ ]Chronic [ ]Hypoxic  [ ]Hypercarbic [ ]Other  [ ]Other organ failure     LABS:                        10.2   6.41  )-----------( 119      ( 27 Jan 2020 06:46 )             33.2   01-27    150<H>  |  112<H>  |  65<H>  ----------------------------<  86  3.5   |  22  |  1.38<H>    Ca    8.9      27 Jan 2020 17:49          RADIOLOGY & ADDITIONAL STUDIES:    PROTEIN CALORIE MALNUTRITION PRESENT: [ ]mild [ ]moderate [ ]severe [ ]underweight [ ]morbid obesity  https://www.andeal.org/vault/1800/web/files/ONC/Table_Clinical%20Characteristics%20to%20Document%20Malnutrition-White%20JV%20et%20al%202012.pdf    Height (cm): 152.4 (01-23-20 @ 13:10)  Weight (kg): 74 (01-23-20 @ 13:10)  BMI (kg/m2): 31.9 (01-23-20 @ 13:10)    [ ]PPSV2 < or = to 30% [ ]significant weight loss  [ ]poor nutritional intake  [ ]anasarca     Albumin, Serum: 3.8 g/dL (01-23-20 @ 10:25)   [ ]Artificial Nutrition      REFERRALS:   [ ]Chaplaincy  [ ]Hospice  [ ]Child Life  [ ]Social Work  [ ]Case management [ ]Holistic Therapy     Goals of Care Document: HPI:  Patient is a 89 year old female with PMHx of HTN, dementia, DM, prior stroke in 2018 (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 LKN after interviewing several family members in person and over the phone is 5:00 PM on 1/22/2020. At baseline she ambulates with a walker and sometimes needs 1 person assist. At 5:00 PM on 1/22/2020, she was seen ambulating at her baseline and speaking, and then acutely became weaker (was not even able to stand up from couch) and became confused, she was asking odd questions and was disoriented. The morning of presentation, she was also noted to have a R facial droop, and family called EMS after she was seen ambulating to the bathroom (unclear if she was weak then), and then she was found down in the bathroom and was unable to get up. Fall itself was not witnessed. She was also found in the ED to be in Afib.   Per family patient is DNR/DNI. Given LKN > 4 hrs prior, was excluded from tPa  She has no LVO, is not an endovascular candidate.   NIHSS 17, MRS 4 (23 Jan 2020 11:37)    Subjective/Objective: The patient was seen and evaluated. She was mildly agitated, non verbal, and not able to f/u any commands.     PERTINENT PM/SXH:   See above       FAMILY HISTORY:  Not able to indicate due to advanced dementia    ITEMS NOT CHECKED ARE NOT PRESENT    SOCIAL HISTORY:   Significant other/partner[ ]    Children[ ]  yes Congregation/Spirituality: [employer]   Substance hx:  [ ]   Tobacco hx:  [ ]   Alcohol hx: [ ]   Home Opioid hx:  [ ] I-Stop Reference No:  Living Situation: [ x]Home  [ ]Long term care  [ ]Rehab [ ]Other   As per care coordination notes: " Per RN, patient is non-verbal. LMSW met with patients son/emergency contact,  Jeromy Nguyen ([phone]) at bedside to complete assessment. Patient lives  with son and daughter-in-law in a private residence in [place] (3 steps  inside and 14 stairs inside). Per son, patient requires assistance with all  ADLs and ambulates with walker. Patient also has a cane, wheelchair, and shower  chair at home. Per son, someone is home with patient 24/7 to provide care, and  the family also private hires HHA as needed. Per son, patient with HCP.  Patients son provided HCP to this  which LMSW scanned into [facility] and  placed in patients chart. Patients son, Jeromy Nguyen identified as primary  health care agent. Patients daughter in law, Alejandra Nguyen identified as  secondary agent.     Patients discharge plan is unclear and is pending hospital course.  Patient  with [employer]/Medicare insurance.  provided contact information and  assured availability."  ADVANCE DIRECTIVES:    DNR  Yes  MOLST  [ ]  Living Will  [ ]   DECISION MAKER(s):  [x ] Health Care Proxy(s)  [ ] Surrogate(s)  [ ] Guardian           Name(s): Phone Number(s): Jeromy Nguyen ([phone])    BASELINE (I)ADL(s) (prior to admission):  [place]: [ ]Total  [ ] Moderate [x ]Dependent    Allergies    Allergy Status Unknown    Intolerances    MEDICATIONS  (STANDING):  ARIPiprazole 2 milliGRAM(s) Oral daily  aspirin Suppository 300 milliGRAM(s) Rectal daily  atorvastatin 80 milliGRAM(s) Oral at bedtime  enoxaparin Injectable 30 milliGRAM(s) SubCutaneous daily  ergocalciferol 07509 Unit(s) Oral every week  levothyroxine Injectable 62.5 MICROGram(s) IV Push at bedtime  niCARdipine Infusion 5 mG/Hr (25 mL/Hr) IV Continuous <Continuous>  OLANZapine Injectable 5 milliGRAM(s) IntraMuscular once  sodium chloride 0.45%. 1000 milliLiter(s) (75 mL/Hr) IV Continuous <Continuous>  valproate sodium IVPB 500 milliGRAM(s) IV Intermittent two times a day    MEDICATIONS  (PRN):  labetalol Injectable 10 milliGRAM(s) IV Push three times a day PRN SBP > 180  levalbuterol Inhalation 0.63 milliGRAM(s) Inhalation every 6 hours PRN Shortness of breath/Wheezing    PRESENT SYMPTOMS: [x ]Unable to obtain due to poor mentation   Source if other than patient:  [ ]Family   [ ]Team     Pain: [ ]yes [ ]no  QOL impact -   Location -                    Aggravating factors -  Quality -  Radiation -  Timing-  Severity (0-10 scale):  Minimal acceptable level (0-10 scale):     PAIN AD Score: 2     [email or web address].[email or web address]/cog/painad.pdf (press ctrl +  left click to view)    Dyspnea:                           [ ]Mild [ ]Moderate [ ]Severe  Anxiety:                             [ ]Mild [ ]Moderate [ ]Severe  Fatigue:                             [ ]Mild [ ]Moderate [ ]Severe  Nausea:                             [ ]Mild [ ]Moderate [ ]Severe  Loss of appetite:              [ ]Mild [ ]Moderate [ ]Severe  Constipation:                    [ ]Mild [ ]Moderate [ ]Severe    Other Symptoms:  [ ]All other review of systems negative     Palliative Performance Status Version 2:   20      %    http://npcrc.org/files/news/palliative_performance_scale_ppsv2.pdf  PHYSICAL EXAM:  Vital Signs Last 24 Hrs  T(C): 36.7 (27 Jan 2020 16:42), Max: 37 (27 Jan 2020 07:07)  T(F): 98 (27 Jan 2020 16:42), Max: 98.6 (27 Jan 2020 07:07)  HR: 93 (27 Jan 2020 18:00) (62 - 119)  BP: 171/70 (27 Jan 2020 18:00) (106/52 - 174/78)  BP(mean): 98 (27 Jan 2020 18:00) (13 - 111)  RR: 15 (27 Jan 2020 18:00) (12 - 33)  SpO2: 97% (27 Jan 2020 18:00) (92% - 100%) I&O's Summary    26 Jan 2020 07:01  -  27 Jan 2020 07:00  --------------------------------------------------------  IN: 0 mL / OUT: 500 mL / NET: -500 mL    27 Jan 2020 07:01  -  27 Jan 2020 19:52  --------------------------------------------------------  IN: 0 mL / OUT: 400 mL / NET: -400 mL      GENERAL:  [x ]Alert  [ ]Oriented x   [ ]Lethargic  [ ]Cachexia  [ ]Unarousable  [ ]Verbal  [x ]Non-Verbal  Behavioral:   [ ] Anxiety  [ ] Delirium [x ] Agitation [ ] Other  HEENT:  [ ]Normal   [x ]Dry mouth   [ ]ET Tube/Trach  [ ]Oral lesions  PULMONARY:   [x ]Clear [ ]Tachypnea  [ ]Audible excessive secretions   [ ]Rhonchi        [ ]Right [ ]Left [ ]Bilateral  [ ]Crackles        [ ]Right [ ]Left [ ]Bilateral  [ ]Wheezing     [ ]Right [ ]Left [ ]Bilatera  [ ]Diminished breath sounds [ ]right [ ]left [ ]bilateral  CARDIOVASCULAR:    [x ]Regular [ ]Irregular [ ]Tachy  [ ]Hung [ ]Murmur [ ]Other  GASTROINTESTINAL:  [x ]Soft  [ ]Distended   [ ]+BS  [ ]Non tender [ ]Tender  [ ]PEG [ ]OGT/ NGT  Last BM: 1/27    GENITOURINARY:  [ ]Normal [ ] Incontinent   [ ]Oliguria/Anuria   [x ]Siddiqi  MUSCULOSKELETAL:   [ ]Normal   [x ]Weakness  [x ]Bed/Wheelchair bound [ ]Edema  NEUROLOGIC:   [ ]No focal deficits  [x ] severe Cognitive impairment  [ ]Dysphagia [ ]Dysarthria [ ]Paresis [ ]Other   SKIN:   [x ]Normal    [ ]Rash  [ ]Pressure ulcer(s)       Present on admission [ ]y [ ]n    CRITICAL CARE:  [ ] Shock Present  [ ]Septic [ ]Cardiogenic [ ]Neurologic [ ]Hypovolemic  [ ]  Vasopressors [ ]  Inotropes   [ ]Respiratory failure present [ ]Mechanical ventilation [ ]Non-invasive ventilatory support [ ]High flow  [ ]Acute  [ ]Chronic [ ]Hypoxic  [ ]Hypercarbic [ ]Other  [ ]Other organ failure     LABS:                        10.2   6.41  )-----------( 119      ( 27 Jan 2020 06:46 )             33.2   01-27    150<H>  |  112<H>  |  65<H>  ----------------------------<  86  3.5   |  22  |  1.38<H>    Ca    8.9      27 Jan 2020 17:49          RADIOLOGY & ADDITIONAL STUDIES:  Reviewed   PROTEIN CALORIE MALNUTRITION PRESENT: [ ]mild [ ]moderate [ ]severe [ ]underweight [ ]morbid obesity  https://www.andeal.org/vault/3385/web/files/ONC/Table_Clinical%20Characteristics%20to%20Document%20Malnutrition-White%20JV%20et%20al%202012.pdf    Height (cm): 152.4 (01-23-20 @ 13:10)  Weight (kg): 74 (01-23-20 @ 13:10)  BMI (kg/m2): 31.9 (01-23-20 @ 13:10)    [ ]PPSV2 < or = to 30% [ ]significant weight loss  [ ]poor nutritional intake  [ ]anasarca     Albumin, Serum: 3.8 g/dL (01-23-20 @ 10:25)   [ ]Artificial Nutrition      REFERRALS:   [ ]Chaplaincy  [ ]Hospice  [ ]Child Life  [ ]Social Work  [ ]Case management [ ]Holistic Therapy     Goals of Care Document:

## 2020-01-27 NOTE — PROGRESS NOTE ADULT - SUBJECTIVE AND OBJECTIVE BOX
THE PATIENT WAS SEEN AND EXAMINED BY ME WITH THE HOUSESTAFF AND STROKE TEAM DURING MORNING ROUNDS.   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 Last reported normal 5:00 PM on 1/22/2020. At baseline she ambulates with a walker and sometimes needs 1 person assist. At 5:00 PM on 1/22/2020, she was seen ambulating at her baseline and speaking, and then acutely became weaker (was not even able to stand up from couch) and became confused, she was asking odd questions and was disoriented. On day of admission she was also noted to have a R facial droop, and family called EMS after she was seen ambulating to the bathroom (unclear if she was weak then), and then she was found down in the bathroom and was unable to get up. Per family patient is DNR/DNI. Given LKN > 4 hrs prior, was excluded from tPa. She has no large vessel occlusion: is not an endovascular candidate. NIHSS 17, MRS 4      SUBJECTIVE: No events overnight.  No new neurologic complaints.      ARIPiprazole 2 milliGRAM(s) Oral daily  aspirin Suppository 300 milliGRAM(s) Rectal daily  atorvastatin 80 milliGRAM(s) Oral at bedtime  enoxaparin Injectable 30 milliGRAM(s) SubCutaneous daily  ergocalciferol 80480 Unit(s) Oral every week  labetalol Injectable 10 milliGRAM(s) IV Push three times a day PRN  levalbuterol Inhalation 0.63 milliGRAM(s) Inhalation every 6 hours PRN  levothyroxine Injectable 62.5 MICROGram(s) IV Push at bedtime  niCARdipine Infusion 5 mG/Hr IV Continuous <Continuous>  OLANZapine Injectable 5 milliGRAM(s) IntraMuscular once  sodium chloride 0.9%. 1000 milliLiter(s) IV Continuous <Continuous>  valproate sodium IVPB 500 milliGRAM(s) IV Intermittent two times a day      PHYSICAL EXAM:   Vital Signs Last 24 Hrs  T(C): 36.6 (26 Jan 2020 20:00), Max: 37 (26 Jan 2020 18:00)  T(F): 97.8 (26 Jan 2020 20:00), Max: 98.6 (26 Jan 2020 18:00)  HR: 75 (27 Jan 2020 06:00) (62 - 119)  BP: 127/62 (27 Jan 2020 06:00) (106/52 - 174/78)  BP(mean): 80 (27 Jan 2020 06:00) (60 - 148)  RR: 16 (27 Jan 2020 06:00) (10 - 33)  SpO2: 94% (27 Jan 2020 06:00) (92% - 100%)    General: No acute distress  HEENT: EOM intact, B/L no blink to threat  Abdomen: Soft, nontender, nondistended   Extremities: No edema    NEUROLOGICAL EXAM:  Mental status: eyes open, awake, mumbles unintelligible sounds, unable to follow commands  Cranial Nerves: Right nasolabial flattening, no nystagmus., B/L no blink to threat, oculocephalic intact  Motor exam: Normal tone, no drift, moves all extremities spontaneously against gravity  Sensation: appears Intact to noxious stimuli  Coordination/ Gait: unable to participate with exam     LABS:                        11.3   8.75  )-----------( 147      ( 26 Jan 2020 06:38 )             35.7    01-26    142  |  104  |  52<H>  ----------------------------<  89  3.6   |  22  |  1.21    Ca    9.4      26 Jan 2020 06:38      Hemoglobin A1C, Whole Blood: 5.2 % (01-23 @ 21:03)      IMAGING: Reviewed by me.   Head CT (01/26/20) Evolving left MCA infarct, more pronounced decreased attenuation, loss of gray-white distinction and left frontal, temporal, and parietal lobes, unchanged foci of hemorrhagic transformation. Redemonstration of remote right cerebral hemisphere infarcts, microvascular disease, lacunar infarcts, no new extra axial collection or midline shift    Head CT (01/25/20) Evolving left MCA infarct with hemorrhagic transformation. Redemonstration remote infarcts, right lateral ventricle ex vacuo enlargement, microvascular disease, lacunar infarcts, volume loss loss, no new midline shift.      EEG (01/24/20) Potential epileptogenic foci in the left frontal, right frontal, right temporal, right parietal and right occipital regions. Moderate nonspecific diffuse or multifocal cerebral dysfunction.  No seizure seen    Head CT (01/23/20) No evidence of acute intracranial pathology. Chronic bilateral MCA territory infarcts.    CTA Head/Neck (01/23/20) CTA neck: No large vessel occlusion or major stenosis.    CTA head: No large vessel occlusion or major stenosis. 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 No prevertebral soft tissue swelling. Degenerative changes THE PATIENT WAS SEEN AND EXAMINED BY ME WITH THE HOUSESTAFF AND STROKE TEAM DURING MORNING ROUNDS.   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 Last reported normal 5:00 PM on 1/22/2020. At baseline she ambulates with a walker and sometimes needs 1 person assist. At 5:00 PM on 1/22/2020, she was seen ambulating at her baseline and speaking, and then acutely became weaker (was not even able to stand up from couch) and became confused, she was asking odd questions and was disoriented. On day of admission she was also noted to have a R facial droop, and family called EMS after she was seen ambulating to the bathroom (unclear if she was weak then), and then she was found down in the bathroom and was unable to get up. Per family patient is DNR/DNI. Given LKN > 4 hrs prior, was excluded from tPa. She has no large vessel occlusion: is not an endovascular candidate. NIHSS 17, MRS 4      SUBJECTIVE: No events overnight.  No new neurologic complaints.      ARIPiprazole 2 milliGRAM(s) Oral daily  aspirin Suppository 300 milliGRAM(s) Rectal daily  atorvastatin 80 milliGRAM(s) Oral at bedtime  enoxaparin Injectable 30 milliGRAM(s) SubCutaneous daily  ergocalciferol 31818 Unit(s) Oral every week  labetalol Injectable 10 milliGRAM(s) IV Push three times a day PRN  levalbuterol Inhalation 0.63 milliGRAM(s) Inhalation every 6 hours PRN  levothyroxine Injectable 62.5 MICROGram(s) IV Push at bedtime  niCARdipine Infusion 5 mG/Hr IV Continuous <Continuous>  OLANZapine Injectable 5 milliGRAM(s) IntraMuscular once  sodium chloride 0.9%. 1000 milliLiter(s) IV Continuous <Continuous>  valproate sodium IVPB 500 milliGRAM(s) IV Intermittent two times a day      PHYSICAL EXAM:   Vital Signs Last 24 Hrs  T(C): 36.6 (26 Jan 2020 20:00), Max: 37 (26 Jan 2020 18:00)  T(F): 97.8 (26 Jan 2020 20:00), Max: 98.6 (26 Jan 2020 18:00)  HR: 75 (27 Jan 2020 06:00) (62 - 119)  BP: 127/62 (27 Jan 2020 06:00) (106/52 - 174/78)  BP(mean): 80 (27 Jan 2020 06:00) (60 - 148)  RR: 16 (27 Jan 2020 06:00) (10 - 33)  SpO2: 94% (27 Jan 2020 06:00) (92% - 100%)    General: No acute distress  HEENT: EOM intact, B/L no blink to threat  Abdomen: Soft, nontender, nondistended   Extremities: No edema    NEUROLOGICAL EXAM:  Mental status: eyes closed, mumbles unintelligible sounds, unable to follow commands  Cranial Nerves: Right nasolabial flattening, no nystagmus., B/L no blink to threat, oculocephalic intact  Motor exam: Normal tone, no drift, moves all extremities spontaneously against gravity in bed  Sensation: appears Intact to noxious stimuli  Coordination/ Gait: unable to participate with exam     LABS:                        11.3   8.75  )-----------( 147      ( 26 Jan 2020 06:38 )             35.7    01-26    142  |  104  |  52<H>  ----------------------------<  89  3.6   |  22  |  1.21    Ca    9.4      26 Jan 2020 06:38      Hemoglobin A1C, Whole Blood: 5.2 % (01-23 @ 21:03)      IMAGING: Reviewed by me.   Head CT (01/26/20) Evolving left MCA infarct, more pronounced decreased attenuation, loss of gray-white distinction and left frontal, temporal, and parietal lobes, unchanged foci of hemorrhagic transformation. Redemonstration of remote right cerebral hemisphere infarcts, microvascular disease, lacunar infarcts, no new extra axial collection or midline shift    Head CT (01/25/20) Evolving left MCA infarct with hemorrhagic transformation. Redemonstration remote infarcts, right lateral ventricle ex vacuo enlargement, microvascular disease, lacunar infarcts, volume loss loss, no new midline shift.      EEG (01/24/20) Potential epileptogenic foci in the left frontal, right frontal, right temporal, right parietal and right occipital regions. Moderate nonspecific diffuse or multifocal cerebral dysfunction.  No seizure seen    Head CT (01/23/20) No evidence of acute intracranial pathology. Chronic bilateral MCA territory infarcts.    CTA Head/Neck (01/23/20) CTA neck: No large vessel occlusion or major stenosis.    CTA head: No large vessel occlusion or major stenosis. 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 No prevertebral soft tissue swelling. Degenerative changes

## 2020-01-27 NOTE — PROGRESS NOTE ADULT - ASSESSMENT
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 On 1/23/20 presented to the ED with unresponsiveness and decreased verbal output. Pt febrile with leukocytosis. EEG showed potential epileptogenic foci in the left frontal, right frontal, right temporal, right parietal and right occipital regions. Head CT showed left MCA infarct, with foci of hemorrhagic transformation.    Impression: Left MCA ischemic infarct with foci of hemorrhagic transformation. Etiology likely cardioembolic in the setting of atrial fibrillation.     NEURO: Continue close monitoring for neurologic deterioration, VEEG shows Potential epileptogenic foci in the left frontal, right frontal, right temporal, right parietal and right occipital regions.,  continue with Depacon 500mg BID,  -160mmHg with slow titration to normotension as tolerated, CTA shows incidental 1 cm saccular L ICA aneurysm: as per neurosurgery eval: no acute NSX intervention at this time, titrate statin to LDL goal less than 70, unable to tolerate MRI, repeat CTH as indicated to ensure stability. Pt made DNR. Palliative team to discuss goals of care    ANTITHROMBOTIC THERAPY: ASA for now, no AC in setting of hemorrhagic transformation at this time, pending goals of care, clinical and radiological improvement will determine timeline of anticoagulation.     PULMONARY: CXR (01/25/20) clear, protecting airway, saturating well, no wheezing. On 01/23/20 while having CTA she developed expiratory wheezing and inspiratory stridor. Was not able to tolerate laying flat for the MRI. Was noted to become hypoxic, Pulm US revealed B lines c/w acute pulmonary edema. Pt developed flash pulmonary edema, had HTN emergency, IV Lasix and labetalol provided. Dr Oshea/Oliver (Pulmonary) consult appreciated. Dr Arriaga (Medicine) consult appreciated    CARDIOVASCULAR: check TTE, cardiac monitoring rate control. On Cardene drip for BP management. Contacted Dr Guille River (pt's private cardiologist) for further  recommendations, Dr. Gray consult appreciated, cardene for now.                         SBP goal: 120-160mmHg    GASTROINTESTINAL: Dysphagia screen failed,  S/S eval for NPO. Will contact Palliative team to discuss goals of care.     Diet: NPO    RENAL: BUN/Cr 52/1.21, trending down. ROMERO: In setting of IV contrast vs diuretics and HTN emergency, will continue to trend BUN/Cr,  lasix PRN good urine output, Dr Pool (Nephro) consult appreciated      Na Goal: Greater than 135     Siddiqi: N    HEMATOLOGY: H/H without acute change, Platelets 147, thrombocytopenia, will continue to monitor      DVT ppx: LMWH     ID: afebrile in am, febrile on 01/24/20 T max 38.3,  leukocytosis, UA: negative, Blood culture: NGTD X2. Urine culture sent, pending results    OTHER:  Overnight with confusion and agitation requiring 1:1. Plan/goals of care discussed with pt's family at bedside    DISPOSITION: Rehab or home depending on  gaols of care.     CORE MEASURES:        Admission NIHSS: 17     TPA: NO      LDL/HDL: 40/58     Depression Screen: NA     Statin Therapy: Once PO access obtained     Dysphagia Screen:  FAIL     Smoking NO      Afib YES      Stroke Education  YES    Obtain screening ultrasound in patients who meet 1 or more of the following criteria as patient is high risk for DVT/PE upon admission:   [] History of DVT/PE  []Hypercoagulable states (Factor V Leiden, Cancer, OCP, etc. 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 On 1/23/20 presented to the ED with unresponsiveness and decreased verbal output. Pt febrile with leukocytosis. EEG showed potential epileptogenic foci in the left frontal, right frontal, right temporal, right parietal and right occipital regions. Head CT showed left MCA infarct, with foci of hemorrhagic transformation.    Impression: Left MCA ischemic infarct with foci of hemorrhagic transformation. Etiology likely cardioembolic in the setting of atrial fibrillation.     NEURO: neurologically without acute change, noted agitated and awake most of night, now appears to be sleeping, Continue close monitoring for neurologic deterioration, VEEG shows Potential epileptogenic foci in the left frontal, right frontal, right temporal, right parietal and right occipital regions.,  continue with Depacon 500mg BID,  -160mmHg with slow titration to normotension as tolerated, CTA shows incidental 1 cm saccular L ICA aneurysm: as per neurosurgery eval: no acute NSX intervention at this time, titrate statin to LDL goal less than 70, unable to tolerate MRI, repeat CTH as indicated to ensure stability. Pt made DNR/DNI, Palliative team to discuss goals of care    ANTITHROMBOTIC THERAPY: ASA for now, no AC in setting of hemorrhagic transformation at this time, pending goals of care, clinical and radiological improvement will determine timeline of anticoagulation.     PULMONARY: CXR (01/25/20) clear, protecting airway, saturating well, no wheezing. On 01/23/20 while having CTA she developed expiratory wheezing and inspiratory stridor. Was not able to tolerate laying flat for the MRI. Was noted to become hypoxic, Pulm US revealed B lines c/w acute pulmonary edema. Pt developed flash pulmonary edema, had HTN emergency, IV Lasix and labetalol provided. Dr Oshea/Oliver (Pulmonary) consult appreciated. Dr Arriaga (Medicine) consult appreciated    CARDIOVASCULAR: check TTE, cardiac monitoring rate control. On Cardene drip for BP management. Contacted Dr Guille River (pt's private cardiologist) for further  recommendations, Dr. Gray consult appreciated, cardene for now.    Continue to follow recommendations.                      SBP goal: 120-160mmHg    GASTROINTESTINAL: Dysphagia screen failed,  S/S eval for NPO.  Palliative team to discuss goals of care.     Diet: NPO    RENAL: BUN/Cr 52/1.21, trending down. ROMERO: In setting of IV contrast vs diuretics and HTN emergency,  continue to trend BUN/Cr,  lasix PRN good urine output, Dr Pool (Nephro) consult appreciated , monitor hypernatremia, KCl supplement for hypokalemia. Continue to follow up recommendations.     Na Goal: Greater than 135     Siddiqi: N    HEMATOLOGY: H/H without acute change, Platelets 119, thrombocytopenia appears to flucutuate,  continue to monitor      DVT ppx: LMWH     ID: afebrile in am, febrile on 01/24/20 T max 38.3,  leukocytosis, UA: negative, Blood culture: NGTD X2.      OTHER:  Overnight with confusion and agitation requiring 1:1. Plan/goals of care discussed with pt's family at bedside    DISPOSITION: Rehab or home depending on  gaols of care.     CORE MEASURES:        Admission NIHSS: 17     TPA: NO      LDL/HDL: 40/58     Depression Screen: NA     Statin Therapy: Once PO access obtained     Dysphagia Screen:  FAIL     Smoking NO      Afib YES      Stroke Education  YES    Obtain screening ultrasound in patients who meet 1 or more of the following criteria as patient is high risk for DVT/PE upon admission:   [] History of DVT/PE  []Hypercoagulable states (Factor V Leiden, Cancer, OCP, etc. )  []Prolonged immobility (hemiplegia/hemiparesis/post operative or any other extended immobilization)   [] Transferred from outside facility (Rehab or Long term care)  [] Age </= to 50

## 2020-01-27 NOTE — PROGRESS NOTE ADULT - ASSESSMENT
89F with HTN, DM, prior CVA 2018 with residual mixed receptive/expressive aphasia a/w AMS 2/2 new L MCA CVA with hemorrhagic transformation. A1c 5.2. LDL 40.    Impression  1. AFib: rate-controlled  2. HTN    Recommendations:  1. Lopressor 5mg IV q6h  2. Cardene gtt to maintain SBP < 140  3. Transition to Metoprolol Tartrate 25mg BID and Valsartan-HCTZ 80mg-12.5mg daily when tolerating PO or if PEG placed  4. No AC given hemorraghic conversion      Manny Iniguez M.D.  Cardiology Fellow  [phone]  For all Cardiology service contact information, go to [email or web address] and use "[email or web address]" to login. 89F with HTN, DM, prior CVA 2018 with residual mixed receptive/expressive aphasia a/w AMS 2/2 new L MCA CVA with hemorrhagic transformation. A1c 5.2. LDL 40.    Impression  1. AFib: rate-controlled  2. HTN    Recommendations:  1. Lopressor 5mg IV q6h  2. Cardene gtt to maintain SBP < 140  3. Transition to Metoprolol Tartrate 25mg BID and Valsartan-HCTZ 80mg-12.5mg daily when tolerating PO or if PEG placed  4. No AC given hemorraghic conversion    Cardiology to sign off. Please call back with questions.    Manny Iniguez M.D.  Cardiology Fellow  [phone]  For all Cardiology service contact information, go to [email or web address] and use "[email or web address]" to login. 89F with HTN, DM, prior CVA 2018 with residual mixed receptive/expressive aphasia.  A/W AMS 2/2 new L MCA CVA with hemorrhagic transformation. A1c 5.2. LDL 40.      Impression  1. AFib: rate-controlled  2. HTN    Recommendations:  1. Lopressor 5mg IV q6h  2. Cardene gtt to maintain SBP < 140  3. Transition to metoprolol tartrate 25mg BID and Valsartan-HCTZ 80mg-12.5mg daily, when tolerating PO or if PEG placed  4. No AC given hemorraghic conversion    Cardiology to sign off. Please call back with questions.    Manny Iniguez M.D.  Cardiology Fellow  [phone]    Lewis [place] M.D.  Cardiology Attending, Consult Service  Beeper     All Cardiology service information can be found 24/7 on [email or web address], password: [email or web address]

## 2020-01-27 NOTE — CONSULT NOTE ADULT - PROBLEM SELECTOR RECOMMENDATION 2
Not a candidate for A/C due to history of multiple falls
Ongoing discussion about nutritional goals.   Will meet with the HCP at 11 am in order to better define goals and services.

## 2020-01-27 NOTE — PROGRESS NOTE ADULT - SUBJECTIVE AND OBJECTIVE BOX
CARDIOLOGY PROGRESS NOTE    Subjective/24 hour events:   - No overnight events.   - Denies chest pain, palpitations, SOB, lightheadedness, dizziness.    Telemetry: AFib, , mostly controlled, PVCs    MEDICATIONS  (STANDING):  ARIPiprazole 2 milliGRAM(s) Oral daily  aspirin Suppository 300 milliGRAM(s) Rectal daily  atorvastatin 80 milliGRAM(s) Oral at bedtime  enoxaparin Injectable 30 milliGRAM(s) SubCutaneous daily  ergocalciferol 28056 Unit(s) Oral every week  levothyroxine Injectable 62.5 MICROGram(s) IV Push at bedtime  niCARdipine Infusion 5 mG/Hr (25 mL/Hr) IV Continuous <Continuous>  OLANZapine Injectable 5 milliGRAM(s) IntraMuscular once  sodium chloride 0.9%. 1000 milliLiter(s) (50 mL/Hr) IV Continuous <Continuous>  valproate sodium IVPB 500 milliGRAM(s) IV Intermittent two times a day    MEDICATIONS  (PRN):  labetalol Injectable 10 milliGRAM(s) IV Push three times a day PRN SBP > 180  levalbuterol Inhalation 0.63 milliGRAM(s) Inhalation every 6 hours PRN Shortness of breath/Wheezing    Vital Signs Last 24 Hrs  T(C): 36.6 (26 Jan 2020 20:00), Max: 37 (26 Jan 2020 18:00)  T(F): 97.8 (26 Jan 2020 20:00), Max: 98.6 (26 Jan 2020 18:00)  HR: 75 (27 Jan 2020 06:00) (62 - 119)  BP: 127/62 (27 Jan 2020 06:00) (106/52 - 174/78)  BP(mean): 80 (27 Jan 2020 06:00) (60 - 148)  RR: 16 (27 Jan 2020 06:00) (10 - 33)  SpO2: 94% (27 Jan 2020 06:00) (92% - 100%)    I&O's Summary  25 Jan 2020 07:01  -  26 Jan 2020 07:00  --------------------------------------------------------  IN: 250 mL / OUT: 1875 mL / NET: -1625 mL    26 Jan 2020 07:01  -  27 Jan 2020 06:43  --------------------------------------------------------  IN: 0 mL / OUT: 500 mL / NET: -500 mL    Physical Exam:  General: aphasic  HEENT: EOMI	  Neck: JVP not elevated  Chest: Clear to auscultation bilaterally  CV: Irregularly Regular. Normal S1 and S2. No murmurs, rubs, or gallops. No edema.  Abdomen: Soft, non-distended, non-tender  Skin: No rashes, ecchymoses, or skin breakdown  Extremities: Warm.  Neuro: Aphasic. Moves all extremities.  Psych: unable to assess    Labs:             11.3   8.75  )-----------( 147      ( 26 Jan 2020 06:38 )             35.7     01-26    142  |  104  |  52<H>  ----------------------------<  89  3.6   |  22  |  1.21    Ca    9.4      26 Jan 2020 06:38 CARDIOLOGY PROGRESS NOTE    Subjective/24 hour events:   - No overnight events.   - Denies chest pain, palpitations, SOB, lightheadedness, dizziness.    Telemetry: AFib, , mostly controlled, PVCs    MEDICATIONS  (STANDING):  ARIPiprazole 2 milliGRAM(s) Oral daily  aspirin Suppository 300 milliGRAM(s) Rectal daily  atorvastatin 80 milliGRAM(s) Oral at bedtime  enoxaparin Injectable 30 milliGRAM(s) SubCutaneous daily  ergocalciferol 70799 Unit(s) Oral every week  levothyroxine Injectable 62.5 MICROGram(s) IV Push at bedtime  niCARdipine Infusion 5 mG/Hr (25 mL/Hr) IV Continuous <Continuous>  OLANZapine Injectable 5 milliGRAM(s) IntraMuscular once  sodium chloride 0.9%. 1000 milliLiter(s) (50 mL/Hr) IV Continuous <Continuous>  valproate sodium IVPB 500 milliGRAM(s) IV Intermittent two times a day    MEDICATIONS  (PRN):  labetalol Injectable 10 milliGRAM(s) IV Push three times a day PRN SBP > 180  levalbuterol Inhalation 0.63 milliGRAM(s) Inhalation every 6 hours PRN Shortness of breath/Wheezing    Vital Signs Last 24 Hrs  T(C): 36.6 (26 Jan 2020 20:00), Max: 37 (26 Jan 2020 18:00)  T(F): 97.8 (26 Jan 2020 20:00), Max: 98.6 (26 Jan 2020 18:00)  HR: 75 (27 Jan 2020 06:00) (62 - 119)  BP: 127/62 (27 Jan 2020 06:00) (106/52 - 174/78)  BP(mean): 80 (27 Jan 2020 06:00) (60 - 148)  RR: 16 (27 Jan 2020 06:00) (10 - 33)  SpO2: 94% (27 Jan 2020 06:00) (92% - 100%)    Physical Exam:  General: aphasic  HEENT: EOMI	  Neck: JVP not elevated  Chest: Clear to auscultation bilaterally  CV: Irregularly Regular. Normal S1 and S2. No murmurs, rubs, or gallops. No edema.  Abdomen: Soft, non-distended, non-tender  Skin: No rashes, ecchymoses, or skin breakdown  Extremities: Warm.  Neuro: Aphasic. Moves all extremities.  Psych: unable to assess      I&O's Summary  25 Jan 2020 07:01  -  26 Jan 2020 07:00  --------------------------------------------------------  IN: 250 mL / OUT: 1875 mL / NET: -1625 mL    26 Jan 2020 07:01  -  27 Jan 2020 06:43  --------------------------------------------------------  IN: 0 mL / OUT: 500 mL / NET: -500 mL      Labs:             11.3   8.75  )-----------( 147      ( 26 Jan 2020 06:38 )             35.7     01-26  142  |  104  |  52<H>  ----------------------------<  89  3.6   |  22  |  1.21    Ca    9.4      26 Jan 2020 06:38

## 2020-01-27 NOTE — CONSULT NOTE ADULT - PROBLEM SELECTOR PROBLEM 3
Controlled type 2 diabetes mellitus without complication, without long-term current use of insulin
Functional quadriplegia

## 2020-01-28 NOTE — PROGRESS NOTE ADULT - ATTENDING COMMENTS
I spoke to the family in detail in the presence of palliative care team regarding patient's prognosis and next steps. All questions were answered.

## 2020-01-28 NOTE — PROGRESS NOTE ADULT - PROBLEM SELECTOR PLAN 1
Repeat CT shows evolving LEft sided CVA with hemorraghic transformation. Neuro is following  Palliative eval done

## 2020-01-28 NOTE — GOALS OF CARE CONVERSATION - ADVANCED CARE PLANNING - CONVERSATION DETAILS
this [facility] not is still a draft but in summary the HCPs agree on that the patient may not have wanted a PEG. They will discuss about PCU and pleasure feeds. The patient's family was able to give verbalized understanding about the patient's advanced illness (Advanced Dementia), acute medical issues (new CVA with dysphagia, dysarthria, and agitation), and limited chances for regaining swallowing capacity and functionality. We discussed about nutritional goals and the different options for care (NGT, PEG, Pleasure feeds) their risks and benefits. The patient's family agree on that the patient may not have wanted to have a PEG. Her sons expressed his desire to honor the patient's wishes but at the same time he stated he was feeling guilty if not providing his mother with nutrition. We discussed about the ethical principal of autonomy and how he was a proxy for the patient and not really making decisions by himself. we also discussed about possible ramification of starting a dysphagia diet (tolerating, not tolerating, and developing symptoms due to aspiration). Finally, we also discussed about transferring to PCU for symptoms (agitation and dyspnea) monitoring and Rx. The HCPs indicated they were going to further discussed about my and Dr. Miranda's inputs and that then they were going to f/u with me or with the neuro team.     16' were spent in ACP discussing about artificial nutrition. No changes to the MOLST were done since the patient's family was not 100 % ok with pleasure feeds. 30' were spent in non face to face time during this encounter while discussing about the patient's situation, resources, and other Rx options. Time in 11:00, time out 11:30    The patient is already DNR.

## 2020-01-28 NOTE — PROGRESS NOTE ADULT - SUBJECTIVE AND OBJECTIVE BOX
THE PATIENT WAS SEEN AND EXAMINED BY ME WITH THE HOUSESTAFF AND STROKE TEAM DURING MORNING ROUNDS.   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 Last reported normal 5:00 PM on 1/22/2020. At baseline she ambulates with a walker and sometimes needs 1 person assist. At 5:00 PM on 1/22/2020, she was seen ambulating at her baseline and speaking, and then acutely became weaker (was not even able to stand up from couch) and became confused, she was asking odd questions and was disoriented. On day of admission she was also noted to have a R facial droop, and family called EMS after she was seen ambulating to the bathroom (unclear if she was weak then), and then she was found down in the bathroom and was unable to get up. Per family patient is DNR/DNI. Given LKN > 4 hrs prior, was excluded from tPa. She has no large vessel occlusion: is not an endovascular candidate. NIHSS 17, MRS 4    SUBJECTIVE: No events overnight.  No new neurologic complaints.      ARIPiprazole 2 milliGRAM(s) Oral daily  aspirin Suppository 300 milliGRAM(s) Rectal daily  atorvastatin 80 milliGRAM(s) Oral at bedtime  dextrose 5%. 1000 milliLiter(s) IV Continuous <Continuous>  enoxaparin Injectable 30 milliGRAM(s) SubCutaneous daily  ergocalciferol 46922 Unit(s) Oral every week  labetalol Injectable 10 milliGRAM(s) IV Push three times a day PRN  levalbuterol Inhalation 0.63 milliGRAM(s) Inhalation every 6 hours PRN  levothyroxine Injectable 62.5 MICROGram(s) IV Push at bedtime  niCARdipine Infusion 5 mG/Hr IV Continuous <Continuous>  OLANZapine Injectable 5 milliGRAM(s) IntraMuscular once  valproate sodium IVPB 500 milliGRAM(s) IV Intermittent two times a day      PHYSICAL EXAM:   Vital Signs Last 24 Hrs  T(C): 36.4 (27 Jan 2020 20:00), Max: 36.7 (27 Jan 2020 16:42)  T(F): 97.5 (27 Jan 2020 20:00), Max: 98 (27 Jan 2020 16:42)  HR: 89 (28 Jan 2020 06:00) (72 - 119)  BP: 177/70 (28 Jan 2020 06:00) (142/53 - 179/58)  BP(mean): 98 (28 Jan 2020 06:00) (13 - 154)  RR: 24 (28 Jan 2020 06:00) (12 - 24)  SpO2: 98% (28 Jan 2020 06:00) (97% - 100%)    General: No acute distress  HEENT: EOM intact, B/L no blink to threat  Abdomen: Soft, nontender, nondistended   Extremities: No edema    NEUROLOGICAL EXAM:  Mental status: eyes closed, mumbles unintelligible sounds, unable to follow commands  Cranial Nerves: Right nasolabial flattening, no nystagmus., B/L no blink to threat, oculocephalic intact  Motor exam: Normal tone, no drift, moves all extremities spontaneously against gravity in bed  Sensation: appears Intact to noxious stimuli  Coordination/ Gait: unable to participate with exam     LABS:                        11.1   5.34  )-----------( 149      ( 28 Jan 2020 05:22 )             35.1    01-28    153<H>  |  117<H>  |  57<H>  ----------------------------<  76  3.6   |  19<L>  |  1.19    Ca    8.8      28 Jan 2020 05:22      Hemoglobin A1C, Whole Blood: 5.2 % (01-23 @ 21:03)      IMAGING: Reviewed by me.     Head CT (01/26/20) Evolving left MCA infarct, more pronounced decreased attenuation, loss of gray-white distinction and left frontal, temporal, and parietal lobes, unchanged foci of hemorrhagic transformation. Redemonstration of remote right cerebral hemisphere infarcts, microvascular disease, lacunar infarcts, no new extra axial collection or midline shift    Head CT (01/25/20) Evolving left MCA infarct with hemorrhagic transformation. Redemonstration remote infarcts, right lateral ventricle ex vacuo enlargement, microvascular disease, lacunar infarcts, volume loss loss, no new midline shift.      EEG (01/24/20) Potential epileptogenic foci in the left frontal, right frontal, right temporal, right parietal and right occipital regions. Moderate nonspecific diffuse or multifocal cerebral dysfunction.  No seizure seen    Head CT (01/23/20) No evidence of acute intracranial pathology. Chronic bilateral MCA territory infarcts.    CTA Head/Neck (01/23/20) CTA neck: No large vessel occlusion or major stenosis.    CTA head: No large vessel occlusion or major stenosis. 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 No prevertebral soft tissue swelling. Degenerative changes THE PATIENT WAS SEEN AND EXAMINED BY ME WITH THE HOUSESTAFF AND STROKE TEAM DURING MORNING ROUNDS.   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 Last reported normal 5:00 PM on 1/22/2020. At baseline she ambulates with a walker and sometimes needs 1 person assist. At 5:00 PM on 1/22/2020, she was seen ambulating at her baseline and speaking, and then acutely became weaker (was not even able to stand up from couch) and became confused, she was asking odd questions and was disoriented. On day of admission she was also noted to have a R facial droop, and family called EMS after she was seen ambulating to the bathroom (unclear if she was weak then), and then she was found down in the bathroom and was unable to get up. Per family patient is DNR/DNI. Given LKN > 4 hrs prior, was excluded from tPa. She has no large vessel occlusion: is not an endovascular candidate. NIHSS 17, MRS 4    SUBJECTIVE: S/P sedation overnight for agitation     Spoken to in English and native language per DR. Miranda     ARIPiprazole 2 milliGRAM(s) Oral daily  aspirin Suppository 300 milliGRAM(s) Rectal daily  atorvastatin 80 milliGRAM(s) Oral at bedtime  dextrose 5%. 1000 milliLiter(s) IV Continuous <Continuous>  enoxaparin Injectable 30 milliGRAM(s) SubCutaneous daily  ergocalciferol 22979 Unit(s) Oral every week  labetalol Injectable 10 milliGRAM(s) IV Push three times a day PRN  levalbuterol Inhalation 0.63 milliGRAM(s) Inhalation every 6 hours PRN  levothyroxine Injectable 62.5 MICROGram(s) IV Push at bedtime  niCARdipine Infusion 5 mG/Hr IV Continuous <Continuous>  OLANZapine Injectable 5 milliGRAM(s) IntraMuscular once  valproate sodium IVPB 500 milliGRAM(s) IV Intermittent two times a day      PHYSICAL EXAM:   Vital Signs Last 24 Hrs  T(C): 36.4 (27 Jan 2020 20:00), Max: 36.7 (27 Jan 2020 16:42)  T(F): 97.5 (27 Jan 2020 20:00), Max: 98 (27 Jan 2020 16:42)  HR: 89 (28 Jan 2020 06:00) (72 - 119)  BP: 177/70 (28 Jan 2020 06:00) (142/53 - 179/58)  BP(mean): 98 (28 Jan 2020 06:00) (13 - 154)  RR: 24 (28 Jan 2020 06:00) (12 - 24)  SpO2: 98% (28 Jan 2020 06:00) (97% - 100%)    General: No acute distress  HEENT: EOM intact, B/L no blink to threat  Abdomen: Soft, nontender, nondistended   Extremities: No edema    NEUROLOGICAL EXAM:  Mental status: eyes closed, generates spontaneous unintelligible sounds, unable to follow commands  Cranial Nerves: Right nasolabial flattening, no nystagmus., B/L no blink to threat, oculocephalic intact  Motor exam: Normal tone, no drift, moves all extremities spontaneously in bed  Sensation: appears Intact to noxious stimuli  Coordination/ Gait: unable to participate with exam     LABS:                        11.1   5.34  )-----------( 149      ( 28 Jan 2020 05:22 )             35.1    01-28    153<H>  |  117<H>  |  57<H>  ----------------------------<  76  3.6   |  19<L>  |  1.19    Ca    8.8      28 Jan 2020 05:22      Hemoglobin A1C, Whole Blood: 5.2 % (01-23 @ 21:03)      IMAGING: Reviewed by me.     Head CT (01/26/20) Evolving left MCA infarct, more pronounced decreased attenuation, loss of gray-white distinction and left frontal, temporal, and parietal lobes, unchanged foci of hemorrhagic transformation. Redemonstration of remote right cerebral hemisphere infarcts, microvascular disease, lacunar infarcts, no new extra axial collection or midline shift    Head CT (01/25/20) Evolving left MCA infarct with hemorrhagic transformation. Redemonstration remote infarcts, right lateral ventricle ex vacuo enlargement, microvascular disease, lacunar infarcts, volume loss loss, no new midline shift.      EEG (01/24/20) Potential epileptogenic foci in the left frontal, right frontal, right temporal, right parietal and right occipital regions. Moderate nonspecific diffuse or multifocal cerebral dysfunction.  No seizure seen    Head CT (01/23/20) No evidence of acute intracranial pathology. Chronic bilateral MCA territory infarcts.    CTA Head/Neck (01/23/20) CTA neck: No large vessel occlusion or major stenosis.    CTA head: No large vessel occlusion or major stenosis. 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 No prevertebral soft tissue swelling. Degenerative changes

## 2020-01-28 NOTE — PROVIDER CONTACT NOTE (OTHER) - ACTION/TREATMENT ORDERED:
Patient is cleared to be transported to CT scan without Cardene drip and without RN to accompany patient. Patient is cleared to be transported to CT scan without Cardene drip and without RN to accompany patient. 1:1 Observation maintained during transportation/CT scan.

## 2020-01-28 NOTE — PROGRESS NOTE ADULT - SUBJECTIVE AND OBJECTIVE BOX
Patient is a 89y old  Female who presents with a chief complaint of stroke (2020 12:19)      HPI:  Awake, non verbal, slurred speech. Moves all extremities  BP stable on Cardene Infusion  Agitated at times  PAST MEDICAL & SURGICAL HISTORY:      Review of Systems:   CONSTITUTIONAL: No fever, weight loss, or fatigue  EYES: No eye pain, visual disturbances, or discharge  ENMT:  No difficulty hearing, tinnitus, vertigo; No sinus or throat pain  NECK: No pain or stiffness  BREASTS: No pain, masses, or nipple discharge  RESPIRATORY: No cough, wheezing, chills or hemoptysis; No shortness of breath  CARDIOVASCULAR: No chest pain, palpitations, dizziness, or leg swelling  GASTROINTESTINAL: No abdominal or epigastric pain. No nausea, vomiting, or hematemesis; No diarrhea or constipation. No melena or hematochezia.  GENITOURINARY: No dysuria, frequency, hematuria, or incontinence  NEUROLOGICAL: HPI  SKIN: No itching, burning, rashes, or lesions   LYMPH NODES: No enlarged glands  ENDOCRINE: No heat or cold intolerance; No hair loss  MUSCULOSKELETAL: No joint pain or swelling; No muscle, back, or extremity pain  PSYCHIATRIC: No depression, anxiety, mood swings, or difficulty sleeping  HEME/LYMPH: No easy bruising, or bleeding gums  ALLERY AND IMMUNOLOGIC: No hives or eczema    Allergies    Allergy Status Unknown    Intolerances        Social History:     FAMILY HISTORY:      MEDICATIONS  (STANDING):  ARIPiprazole 2 milliGRAM(s) Oral daily  aspirin Suppository 300 milliGRAM(s) Rectal daily  atorvastatin 80 milliGRAM(s) Oral at bedtime  enoxaparin Injectable 30 milliGRAM(s) SubCutaneous daily  ergocalciferol 49246 Unit(s) Oral every week  levothyroxine Injectable 62.5 MICROGram(s) IV Push at bedtime  sodium chloride 0.9%. 1000 milliLiter(s) (50 mL/Hr) IV Continuous <Continuous>  valproate sodium IVPB 500 milliGRAM(s) IV Intermittent two times a day    MEDICATIONS  (PRN):  labetalol Injectable 10 milliGRAM(s) IV Push three times a day PRN SBP > 180  levalbuterol Inhalation 0.63 milliGRAM(s) Inhalation every 6 hours PRN Shortness of breath/Wheezing        CAPILLARY BLOOD GLUCOSE        I&O's Summary    2020 07:01  -  2020 07:00  --------------------------------------------------------  IN: 335 mL / OUT: 2890 mL / NET: -2555 mL        PHYSICAL EXAM:  Vital Signs Last 24 Hrs  T(C): 36.6 (2020 20:00), Max: 38.3 (2020 07:46)  T(F): 97.8 (2020 20:00), Max: 101 (2020 07:46)  HR: 67 (2020 20:00) (62 - 128)  BP: 173/51 (2020 20:00) (137/125 - 195/62)  BP(mean): 87 (2020 20:00) (75 - 184)  RR: 16 (2020 20:00) (13 - 26)  SpO2: 97% (2020 20:00) (91% - 100%)    GENERAL: NAD, well-developed  HEAD:  Atraumatic, Normocephalic  EYES: EOMI, PERRLA, conjunctiva and sclera clear  NECK: Supple, No JVD  CHEST/LUNG: Clear to auscultation bilaterally; No wheeze  HEART: Regular rate and rhythm; No murmurs, rubs, or gallops  ABDOMEN: Soft, Nontender, Nondistended; Bowel sounds present  EXTREMITIES:  2+ Peripheral Pulses, No clubbing, cyanosis, or edema  PSYCH: AAOx3  NEUROLOGY: non-focal  SKIN: No rashes or lesions    LABS:                        10.4   6.40  )-----------( 114      ( 2020 04:52 )             32.9     01-24    139  |  99  |  47<H>  ----------------------------<  153<H>  4.6   |  21<L>  |  1.57<H>    Ca    9.2      2020 04:52    TPro  7.2  /  Alb  3.8  /  TBili  0.6  /  DBili  x   /  AST  16  /  ALT  7<L>  /  AlkPhos  97  01-23    PT/INR - ( 2020 10:25 )   PT: 12.0 sec;   INR: 1.05 ratio         PTT - ( 2020 10:25 )  PTT:30.9 sec      Urinalysis Basic - ( 2020 16:48 )    Color: Light Yellow / Appearance: Clear / S.030 / pH: x  Gluc: x / Ketone: Negative  / Bili: Negative / Urobili: Negative   Blood: x / Protein: Trace / Nitrite: Negative   Leuk Esterase: Negative / RBC: 15 /hpf / WBC 0 /HPF   Sq Epi: x / Non Sq Epi: 0 /hpf / Bacteria: Negative        RADIOLOGY & ADDITIONAL TESTS:    Imaging Personally Reviewed:    Consultant(s) Notes Reviewed:      Care Discussed with Consultants/Other Providers:

## 2020-01-28 NOTE — PROGRESS NOTE ADULT - ASSESSMENT
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 On 1/23/20 presented to the ED with unresponsiveness and decreased verbal output. Pt febrile with leukocytosis. EEG showed potential epileptogenic foci in the left frontal, right frontal, right temporal, right parietal and right occipital regions. Head CT showed left MCA infarct, with foci of hemorrhagic transformation.    Impression: Left MCA ischemic infarct with foci of hemorrhagic transformation. Etiology likely cardioembolic in the setting of atrial fibrillation.     NEURO: neurologically without acute change,  , Continue close monitoring for neurologic deterioration- she remains with functional quadriplegia, VEEG shows Potential epileptogenic foci in the left frontal, right frontal, right temporal, right parietal and right occipital regions.,  continue with Depacon 500mg BID,  -160mmHg with slow titration to normotension as tolerated, CTA shows incidental 1 cm saccular L ICA aneurysm: as per neurosurgery eval: no acute NSX intervention at this time, titrate statin to LDL goal less than 70, unable to tolerate MRI, repeat CTH as indicated to ensure stability. Pt made DNR/DNI, Palliative team to discuss goals of care    ANTITHROMBOTIC THERAPY: ASA for now, no AC in setting of hemorrhagic transformation at this time, pending goals of care, clinical and radiological improvement will determine timeline of anticoagulation.     PULMONARY: CXR (01/25/20) clear, protecting airway, saturating well, no wheezing. On 01/23/20 while having CTA she developed expiratory wheezing and inspiratory stridor. Was not able to tolerate laying flat for the MRI. Was noted to become hypoxic, Pulm US revealed B lines c/w acute pulmonary edema. Pt developed flash pulmonary edema, had HTN emergency, IV Lasix and labetalol provided. Dr Oshea/Oliver (Pulmonary) consult appreciated. Dr Arriaga (Medicine) consult appreciated    CARDIOVASCULAR:  TTE pending, cardiac monitoring rate control. On Cardene drip for BP management transition as recommended by Dr. Montana once enteral access obtained pending goals of care.               SBP goal: 120-160mmHg    GASTROINTESTINAL: Dysphagia screen failed,  S/S eval for NPO.  Palliative team to discuss goals of care.     Diet: NPO    RENAL: BUN/Cr 57/1.19, no acute change. ROMERO: In setting of IV contrast vs diuretics and HTN emergency,  continue to monitor BUN/Cr,  lasix PRN good urine output, Dr Pool (Nephro) consult appreciated , monitor hypernatremia- on hypotonic saline, avid rapid fluctuations and gradual normalization,  hypokalemia resolved . Continue to follow up recommendations.     Na Goal: Greater than 135     Siddiqi: N    HEMATOLOGY: H/H without acute change, Platelets 149, thrombocytopenia appears to fluctuate but improved,  continue to monitor      DVT ppx: LMWH     ID: afebrile in am, febrile on 01/24/20 T max 38.3,  leukocytosis, UA: negative, Blood culture: NGTD X2.      OTHER:    Plan/goals of care discussed with pt's family at bedside    DISPOSITION: Rehab or home depending on  gaols of care.     CORE MEASURES:        Admission NIHSS: 17     TPA: NO      LDL/HDL: 40/58     Depression Screen: NA     Statin Therapy: Once PO access obtained     Dysphagia Screen:  FAIL     Smoking NO      Afib YES      Stroke Education  YES    Obtain screening ultrasound in patients who meet 1 or more of the following criteria as patient is high risk for DVT/PE upon admission:   [] History of DVT/PE  []Hypercoagulable states (Factor V Leiden, Cancer, OCP, etc. 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 On 1/23/20 presented to the ED with unresponsiveness and decreased verbal output. Pt febrile with leukocytosis. EEG showed potential epileptogenic foci in the left frontal, right frontal, right temporal, right parietal and right occipital regions. Head CT showed left MCA infarct, with foci of hemorrhagic transformation.    Impression: Left MCA ischemic infarct with foci of hemorrhagic transformation. Etiology likely cardioembolic in the setting of atrial fibrillation.     NEURO: neurologically without acute change- periods of agitation followed by sedation and somnolence , Continue close monitoring for neurologic deterioration- she remains with functional quadriplegia, repeat CTH pending, VEEG shows Potential epileptogenic foci in the left frontal, right frontal, right temporal, right parietal and right occipital regions.,  continue with Depacon 500mg BID,  -160mmHg with slow titration to normotension as tolerated, CTA shows incidental 1 cm saccular L ICA aneurysm: as per neurosurgery eval: no acute NSX intervention at this time, titrate statin to LDL goal less than 70, unable to tolerate MRI, repeat CTH as indicated to ensure stability. Pt made DNR/DNI, Palliative team to discuss goals of care    ANTITHROMBOTIC THERAPY: ASA for now, no AC in setting of hemorrhagic transformation at this time, pending goals of care, clinical and radiological improvement will determine timeline of anticoagulation.     PULMONARY: CXR (01/25/20) clear, protecting airway, saturating well, no wheezing. prior pulm/micu consult appreciated for expiratory wheezing and inspiratory stridor. Was not able to tolerate laying flat for the MRI.   Pulm consult appreciated:  US revealed B lines c/w acute pulmonary edema. Pt developed flash pulmonary edema, had HTN emergency, IV Lasix and labetalol provided.     CARDIOVASCULAR:  TTE pending, cardiac monitoring rate control. On Cardene drip for BP management transition as recommended by Dr. Montana once enteral access obtained pending goals of care.               SBP goal: 120-160mmHg    GASTROINTESTINAL: Dysphagia screen failed,  S/S eval for NPO.  Palliative team to discuss goals of care.     Diet: NPO    RENAL: BUN/Cr 57/1.19, no acute change. ROMERO: In setting of IV contrast vs diuretics and HTN emergency,  continue to monitor BUN/Cr,  lasix PRN good urine output, Dr Pool (Nephro) consult appreciated , monitor hypernatremia- on hypotonic saline, avoid rapid fluctuations and gradual normalization,  hypokalemia resolved . Continue to follow up recommendations.     Na Goal: Greater than 135     Siddiqi: N    HEMATOLOGY: H/H without acute change, Platelets 149, thrombocytopenia appears to fluctuate but improved,  continue to monitor      DVT ppx: LMWH     ID: afebrile currently, febrile on 01/24/20 T max 38.3,  leukocytosis, UA: negative, Blood culture: NGTD X2.      OTHER:    Plan/goals of care discussed with pt's family at bedside, palliative meeting in progress.     DISPOSITION: Rehab or home depending on  gaols of care.     CORE MEASURES:        Admission NIHSS: 17     TPA: NO      LDL/HDL: 40/58     Depression Screen: NA     Statin Therapy: Once PO access obtained     Dysphagia Screen:  FAIL     Smoking NO      Afib YES      Stroke Education  YES    Obtain screening ultrasound in patients who meet 1 or more of the following criteria as patient is high risk for DVT/PE upon admission:   [] History of DVT/PE  []Hypercoagulable states (Factor V Leiden, Cancer, OCP, etc. 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 On 1/23/20 presented to the ED with unresponsiveness and decreased verbal output. Pt febrile with leukocytosis. EEG showed potential epileptogenic foci in the left frontal, right frontal, right temporal, right parietal and right occipital regions. Head CT showed left MCA infarct, with foci of hemorrhagic transformation.    Impression: Left MCA ischemic infarct with foci of hemorrhagic transformation. Etiology likely cardioembolic in the setting of atrial fibrillation.     NEURO: neurologically without acute change- periods of agitation followed by sedation and somnolence , Continue close monitoring for neurologic deterioration- she remains with functional quadriplegia, repeat CTH pending, VEEG shows Potential epileptogenic foci in the left frontal, right frontal, right temporal, right parietal and right occipital regions.,  continue with Depacon 500mg BID,  -160mmHg with slow titration to normotension as tolerated, CTA shows incidental 1 cm saccular L ICA aneurysm: as per neurosurgery eval: no acute NSX intervention at this time, titrate statin to LDL goal less than 70, unable to tolerate MRI, repeat CTH as indicated to ensure stability. Pt made DNR/DNI, Palliative and Neurology team discussed goals of care with family.  ANTITHROMBOTIC THERAPY: ASA for now, no AC in setting of hemorrhagic transformation at this time, pending goals of care, clinical and radiological improvement will determine timeline of anticoagulation.     PULMONARY: CXR (01/25/20) clear, protecting airway, saturating well, no wheezing. prior pulm/micu consult appreciated for expiratory wheezing and inspiratory stridor. Was not able to tolerate laying flat for the MRI.   Pulm consult appreciated:  US revealed B lines c/w acute pulmonary edema. Pt developed flash pulmonary edema, had HTN emergency, IV Lasix and labetalol provided.     CARDIOVASCULAR:  TTE pending, cardiac monitoring rate control. On Cardene drip for BP management transition as recommended by Dr. Montana once enteral access obtained pending goals of care.               SBP goal: 120-160mmHg    GASTROINTESTINAL: Dysphagia screen failed,  S/S eval for NPO.  Palliative team to discuss goals of care.     Diet: NPO    RENAL: BUN/Cr 57/1.19, no acute change. ROMERO: In setting of IV contrast vs diuretics and HTN emergency,  continue to monitor BUN/Cr,  lasix PRN good urine output, Dr Pool (Nephro) consult appreciated , monitor hypernatremia- on hypotonic saline, avoid rapid fluctuations and gradual normalization,  hypokalemia resolved . Continue to follow up recommendations.     Na Goal: Greater than 135     Siddiqi: N    HEMATOLOGY: H/H without acute change, Platelets 149, thrombocytopenia appears to fluctuate but improved,  continue to monitor      DVT ppx: LMWH     ID: afebrile currently, febrile on 01/24/20 T max 38.3,  leukocytosis, UA: negative, Blood culture: NGTD X2.      OTHER:    Plan/goals of care discussed with pt's family at bedside, palliative meeting in progress.     DISPOSITION: Rehab or home depending on  gaols of care.     CORE MEASURES:        Admission NIHSS: 17     TPA: NO      LDL/HDL: 40/58     Depression Screen: NA     Statin Therapy: Once PO access obtained     Dysphagia Screen:  FAIL     Smoking NO      Afib YES      Stroke Education  YES    Obtain screening ultrasound in patients who meet 1 or more of the following criteria as patient is high risk for DVT/PE upon admission:   [] History of DVT/PE  []Hypercoagulable states (Factor V Leiden, Cancer, OCP, etc. )  []Prolonged immobility (hemiplegia/hemiparesis/post operative or any other extended immobilization)   [] Transferred from outside facility (Rehab or Long term care)  [] Age </= to 50

## 2020-01-29 NOTE — CONSULT NOTE ADULT - SUBJECTIVE AND OBJECTIVE BOX
NUTRITION SUPPORT / TPN CONSULT NOTE    HPI:  Patient is a 89 year old female with PMHx of HTN, Afib, dementia, DM, prior stroke in 2018 (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  Pt has h/o falls and was not anticoagulated for her Afib.  At baseline the pt ambulates with a walker and sometimes needs 1 person assist. At 5:00 PM on 1/22/2020, she was seen ambulating at her baseline and speaking, and then acutely became weaker (was not even able to stand up from couch) and became confused, she was asking odd questions and was disoriented. The morning of presentation, she was also noted to have a R facial droop, and family called EMS after she was seen ambulating to the bathroom (unclear if she was weak then), and then she was found down in the bathroom and was unable to get up. Fall itself was not witnessed. She was also found in the ED to be in Afib.  As per family patient is DNR/DNI.  Since presentation was > 4 hrs from last time she was seen at baseline, she was excluded from tPa.  Without large vessel disease endovascular tx was not indicated.  NIHSS 17, MRS 4.    Work up revealed Left MCA infarct with hemorrhagic transformation over remote infarcts, right lateral ventricle ex vacuo enlargement, microvascular disease, lacunar infarcts, volume loss. Palliative care was called for [facility].   Pt has not eaten since prior to admission.  PEG / NGT feeds were offered to family.  Pt had failed her Swallow eval.  Family refusing NGT feeds as they feel that it would be uncomfortable and she would tug at it .  Pt had in the past expressed that she did not want PEG feeds. Family is concerned that if she can't eat they will be killing her by not feeding her.  Pt's son is a Neurosurgeon and would like the patient to be evaluated for TPN as she hasn't eaten for a week.   Pt is more alert today and pt is for a reeval by Speech.  Family is hoping that pt will make enough of a recovery to at least tolerate comfort feeds.   Pt is aphasic and can't offer complaints.  No vomiting, diarrhea, pain is noted.  Pt moves around a lot and is on 1:1 observation for concerns that she pulls at tubing and tried to get out of bed.    No cough, sob, irregular breathing or Heart beats noted by primary team.  No F/C/S.   PN consult requested to assist w/ management of pt's nutrition while being NPO for a prolonged period of time.  All questions asked and answered to pt's and family's satisfaction.    Current Diet: Diet, NPO (01-28-20 @ 11:58)    Appetite: pt unable to offer  Caloric intake:  [   ]  Adequate   [ x  ] Inadequate      MEDICATIONS  (STANDING):  ARIPiprazole 2 milliGRAM(s) Oral daily  aspirin Suppository 300 milliGRAM(s) Rectal daily  atorvastatin 80 milliGRAM(s) Oral at bedtime  enoxaparin Injectable 30 milliGRAM(s) SubCutaneous daily  ergocalciferol 49840 Unit(s) Oral every week  levothyroxine Injectable 62.5 MICROGram(s) IV Push at bedtime  niCARdipine Infusion 5 mG/Hr (25 mL/Hr) IV Continuous <Continuous>  OLANZapine Injectable 5 milliGRAM(s) IntraMuscular once  sodium chloride 0.9%. 1000 milliLiter(s) (40 mL/Hr) IV Continuous <Continuous>  valproate sodium IVPB 500 milliGRAM(s) IV Intermittent two times a day    MEDICATIONS  (PRN):  labetalol Injectable 10 milliGRAM(s) IV Push three times a day PRN SBP > 180  levalbuterol Inhalation 0.63 milliGRAM(s) Inhalation every 6 hours PRN Shortness of breath/Wheezing      PAST MEDICAL & SURGICAL HISTORY:  Dementia  DM  Afib  HTN  CVA    FAMILY HISTORY: no signif    Social History:  lives alone w/ family (+)HHA    ALLERGIES  Allergy Status Unknown      REVIEW OF SYSTEMS  --------------------------------------------------------------------------------  Pt unable to offer complaints/ concerns    VITALS  T(C): 36.6 (01-29-20 @ 07:46), Max: 36.7 (01-28-20 @ 20:00)  HR: 96 (01-29-20 @ 12:00) (75 - 118)  BP: 158/57 (01-29-20 @ 12:00) (108/80 - 171/63)  RR: 16 (01-29-20 @ 12:00) (11 - 31)  SpO2: 99% (01-29-20 @ 12:00) (96% - 100%)  I&O's Detail    28 Jan 2020 07:01  -  29 Jan 2020 07:00  --------------------------------------------------------  IN:    dextrose 5%.: 540 mL    IV PiggyBack: 150 mL    niCARdipine Infusion: 670 mL    sodium chloride 0.9%.: 520 mL  Total IN: 1880 mL    OUT:    Indwelling Catheter - Urethral: 800 mL  Total OUT: 800 mL    Total NET: 1080 mL      PHYSICAL EXAM  --------------------------------------------------------------------------------  	Gen: NAD, Arousable, moans  	HEENT: NC/AT,  PERRL, supple neck, trach midline, clear oropharynx, mucosa moist  	Pulm: CTA B/L  	CV: RRR  	GI: +BS, soft, nontender/nondistended              MSK: FROM,  no deformity nor contractures              Vascular:  Equally Warm, no clubbing, cyanosis, nor edema  	Neuro: Aphasic, moans, no follow commands.  	Skin: Warm, with good turgor, and without rashes  	      LABS:                        11.7   8.48  )-----------( 125      ( 29 Jan 2020 04:29 )             41.0     01-29    147<H>  |  115<H>  |  59<H>  ----------------------------<  90  4.2   |  17<L>  |  1.27    Calcium, Serum:    9.0      29 Jan 2020 04:29  Protein Total, Serum: 7.2 g/dL  Albumin, Serum: 3.8 g/dL  Bilirubin Total, Serum: 0.6 mg/dL  Alkaline Phosphatase, Serum: 97 U/L  Aspartate Aminotransferase (AST/SGOT): 16 U/L  Alanine Aminotransferase (ALT/SGPT): 7 U/Lassification.    Lipid Profile (01.23.20 @ 21:22)    Total Cholesterol/HDL Ratio Measurement: 2.0 RATIO    Cholesterol, Serum: 116 mg/dL    Triglycerides, Serum: 91 mg/dL    HDL Cholesterol, Serum: 58 mg/dL      CULTURES:  Culture - Blood (01.24.20 @ 13:19)    Specimen Source: .Blood Blood-Peripheral    Culture Results:   No growth at 5 days.      RADIOLOGY:  < from: CT Head No Cont (01.28.20 @ 12:13) >  FINDINGS:  Redemonstrated are bilateral MCA territory infarcts with small area of hemorrhage within the left frontal region, unchanged. Age-appropriate involutional changes and moderate microvascular ischemic changes are similar in appearance.    No hydrocephalus, midline shift or extra-axial collections are identified.    The visualized paranasalsinuses and tympanomastoid cavities are free of acute disease.    Impression:    Unchanged bilateral MCA territory infarcts with stable hemorrhage within the left frontal lobe.

## 2020-01-29 NOTE — PROGRESS NOTE ADULT - PROBLEM SELECTOR PLAN 5
Will continue to f/u for GOC.   Allen Robbins MD.    Geriatrics and Palliative Care Consult Service.   From 9am to 5 pm Monday to Friday, please call [phone]  After hours or during the weekend, please call [phone].

## 2020-01-29 NOTE — SWALLOW BEDSIDE ASSESSMENT ADULT - COMMENTS
Given LKN > 4 hrs prior, was excluded from tPa. She has no LVO, is not an endovascular candidate. NIHSS 17, MRS 4. Impression: mixed receptive/expressive aphasia likely due to cardioembolism from Afib. CTH: No evidence of acute intracranial pathology. Chronic bilateral MCA territory infarcts.  Bedside swallow evaluation attempted however deferred 1/25 as patient was restless, agitated, lethargic.

## 2020-01-29 NOTE — SWALLOW BEDSIDE ASSESSMENT ADULT - ASPIRATION PRECAUTIONS
yes/for secretions and enteral feeds, if initiated
yes/for secretions and enteral feeds, if initiated

## 2020-01-29 NOTE — CHART NOTE - NSCHARTNOTEFT_GEN_A_CORE
Nutrition Follow Up Note  Patient seen for: Stroke Unit f/u    Chart reviewed, events noted. Adm dx: CVA. ST hu  noted severe dysphagia. GOC discussion noted  " The patient's family agree on that the patient may not have wanted to have a PEG. Her sons expressed his desire to honor the patient's wishes but at the same time he stated he was feeling guilty if not providing his mother with nutrition. We also discussed about possible ramification of starting a dysphagia diet (tolerating, not tolerating, and developing symptoms due to aspiration)." Family to decide on options    Source: chart, team    Diet :  NPO   (pt has been NPO since )  IV NS@40cc/hr    GI: no N/V, last BM     Daily Weight in k ()  no recent wt    Pertinent Medications: MEDICATIONS  (STANDING):  ARIPiprazole 2 milliGRAM(s) Oral daily  aspirin Suppository 300 milliGRAM(s) Rectal daily  atorvastatin 80 milliGRAM(s) Oral at bedtime  enoxaparin Injectable 30 milliGRAM(s) SubCutaneous daily  ergocalciferol 85646 Unit(s) Oral every week  levothyroxine Injectable 62.5 MICROGram(s) IV Push at bedtime  niCARdipine Infusion 5 mG/Hr (25 mL/Hr) IV Continuous <Continuous>  OLANZapine Injectable 5 milliGRAM(s) IntraMuscular once  sodium chloride 0.9%. 1000 milliLiter(s) (40 mL/Hr) IV Continuous <Continuous>  valproate sodium IVPB 500 milliGRAM(s) IV Intermittent two times a day    MEDICATIONS  (PRN):  labetalol Injectable 10 milliGRAM(s) IV Push three times a day PRN SBP > 180  levalbuterol Inhalation 0.63 milliGRAM(s) Inhalation every 6 hours PRN Shortness of breath/Wheezing     @ 04:29: Na 147<H>, BUN 59<H>, Cr 1.27, BG 90, K+ 4.2, Phos --, Mg --, Alk Phos --, ALT/SGPT --, AST/SGOT --, HbA1c --   @ 16:40: Na 146<H>, BUN 57<H>, Cr 1.33<H>, <H>, K+ 3.3<L>, Phos --, Mg --, Alk Phos --, ALT/SGPT --, AST/SGOT --, HbA1c --    Finger Sticks:      Skin per nursing documentation: no pressure injuries documented   Edema: none    Estimated Needs:   [x ] no change since previous assessment  [ ] recalculated:     Previous Nutrition Diagnosis: none  Nutrition Diagnosis is:    New Nutrition Diagnosis: swallowing difficulty  Related to: likely neurological causes   As evidenced by: + dysphagia, NPO     Interventions:     Recommend  1 No recommendations at this time, will f/u on family wishes regarding nutrition    Monitoring and Evaluation:     Continue to monitor NPO status, weights, labs, skin integrity    RD remains available upon request and will follow up per protocol Nutrition Follow Up Note  Patient seen for: Stroke Unit f/u    Chart reviewed, events noted. Adm dx: CVA. ST hu  noted severe dysphagia. GOC discussion noted  " The patient's family agree on that the patient may not have wanted to have a PEG. Her sons expressed his desire to honor the patient's wishes but at the same time he stated he was feeling guilty if not providing his mother with nutrition. We also discussed about possible ramification of starting a dysphagia diet (tolerating, not tolerating, and developing symptoms due to aspiration)." Family to decide on options    Source: chart, team    Diet :  NPO   pt has been NPO x 7 days  (original NPO order)    IV NS@40cc/hr    GI: no N/V, last BM     Daily Weight in k ()  no recent wt    Pertinent Medications: MEDICATIONS  (STANDING):  ARIPiprazole 2 milliGRAM(s) Oral daily  aspirin Suppository 300 milliGRAM(s) Rectal daily  atorvastatin 80 milliGRAM(s) Oral at bedtime  enoxaparin Injectable 30 milliGRAM(s) SubCutaneous daily  ergocalciferol 32957 Unit(s) Oral every week  levothyroxine Injectable 62.5 MICROGram(s) IV Push at bedtime  niCARdipine Infusion 5 mG/Hr (25 mL/Hr) IV Continuous <Continuous>  OLANZapine Injectable 5 milliGRAM(s) IntraMuscular once  sodium chloride 0.9%. 1000 milliLiter(s) (40 mL/Hr) IV Continuous <Continuous>  valproate sodium IVPB 500 milliGRAM(s) IV Intermittent two times a day    MEDICATIONS  (PRN):  labetalol Injectable 10 milliGRAM(s) IV Push three times a day PRN SBP > 180  levalbuterol Inhalation 0.63 milliGRAM(s) Inhalation every 6 hours PRN Shortness of breath/Wheezing     @ 04:29: Na 147<H>, BUN 59<H>, Cr 1.27, BG 90, K+ 4.2, Phos --, Mg --, Alk Phos --, ALT/SGPT --, AST/SGOT --, HbA1c --   @ 16:40: Na 146<H>, BUN 57<H>, Cr 1.33<H>, <H>, K+ 3.3<L>, Phos --, Mg --, Alk Phos --, ALT/SGPT --, AST/SGOT --, HbA1c --    Finger Sticks:      Skin per nursing documentation: no pressure injuries documented   Edema: none    Estimated Needs:   [x ] no change since previous assessment  [ ] recalculated:     Previous Nutrition Diagnosis: none  Nutrition Diagnosis is:    New Nutrition Diagnosis: swallowing difficulty  Related to: likely neurological causes   As evidenced by: + dysphagia, NPO     Interventions:     Recommend  1 No recommendations at this time, will f/u on family wishes regarding nutrition    Monitoring and Evaluation:     Continue to monitor NPO status, weights, labs, skin integrity    RD remains available upon request and will follow up per protocol

## 2020-01-29 NOTE — PROGRESS NOTE ADULT - ASSESSMENT
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 On 1/23/20 presented to the ED with unresponsiveness and decreased verbal output. Pt febrile with leukocytosis. EEG showed potential epileptogenic foci in the left frontal, right frontal, right temporal, right parietal and right occipital regions. Head CT showed left MCA infarct, with foci of hemorrhagic transformation.    Impression: Left MCA ischemic infarct with foci of hemorrhagic transformation. Etiology likely cardioembolic in the setting of atrial fibrillation.     NEURO: neurologically more awake and somewhat interactive today- periods of agitation at times , Continue close monitoring for neurologic deterioration- she remains with functional quadriplegia, repeat CTH demosntrated stable hemorrhagic transformation, VEEG shows Potential epileptogenic foci in the left frontal, right frontal, right temporal, right parietal and right occipital regions.,  continue with Depacon 500mg BID,  -160mmHg with slow titration to normotension as tolerated, CTA shows incidental 1 cm saccular L ICA aneurysm: as per neurosurgery eval: no acute NSX intervention at this time, titrate statin to LDL goal less than 70, unable to tolerate MRI.      ANTITHROMBOTIC THERAPY: ASA for now, no AC in setting of hemorrhagic transformation at this time, pending goals of care, clinical and radiological improvement will determine timeline of anticoagulation if candidate.    PULMONARY: CXR (01/25/20) clear, protecting airway, saturating well, no wheezing. Previous expiratory wheezing and inspiratory stridor. Was not able to tolerate laying flat for the MRI.   Pulm consult appreciated:  US revealed B lines c/w acute pulmonary edema. Pt developed flash pulmonary edema, had HTN emergency, IV Lasix and labetalol provided. Overall improved.    CARDIOVASCULAR:  TTE pending, cardiac monitoring rate control. On Cardene drip for BP management transition as recommended by Dr. Montana once enteral access obtained pending goals of care.               SBP goal: 120-160mmHg    GASTROINTESTINAL: Dysphagia screen failed,  S/S eval for NPO.  Palliative team to discuss goals of care.     Diet: NPO    RENAL: BUN/Cr 59/1.27, no acute change. ROMERO: In setting of IV contrast vs diuretics and HTN emergency,  continue to monitor BUN/Cr,  lasix PRN good urine output, Dr Pool (Nephro) consult appreciated , monitor hypernatremia- on hypotonic saline, avoid rapid fluctuations and gradual normalization- improved,  hypokalemia resolved after supplementaiton. Continue to follow up recommendations.     Na Goal: Greater than 135     Siddiqi: N    HEMATOLOGY: H/H without acute change, Platelets 125, thrombocytopenia appears to fluctuate, c ontinue to monitor possibly reactive     DVT ppx: LMWH     ID: afebrile currently, was febrile on 01/24/20 T max 38.3,  no leukocytosis, UA: negative, Blood culture: NGTD X2.      OTHER:    Plan/goals of care discussed with pt's family at bedside by neurology team as well as palliative care team prior.  Discussed current condition, plan of care and overall prognosis.  Son (HCP) and remainder of family to further discuss after repeat SLP eval as patient slightly more awake today.  Expressed understanding and discussed at length risks benefits alternatives of PEG vs Comfort feeds vs NGT, requested TPN consult - pending. Expressed his mother has been without nutrition for almost a week and will follow up for decision.       DISPOSITION: Rehab or home depending on  gaols of care.     CORE MEASURES:        Admission NIHSS: 17     TPA: NO      LDL/HDL: 40/58     Depression Screen: NA     Statin Therapy: Once PO access obtained     Dysphagia Screen:  FAIL     Smoking NO      Afib YES      Stroke Education  YES    Obtain screening ultrasound in patients who meet 1 or more of the following criteria as patient is high risk for DVT/PE upon admission:   [] History of DVT/PE  []Hypercoagulable states (Factor V Leiden, Cancer, OCP, etc. )  []Prolonged immobility (hemiplegia/hemiparesis/post operative or any other extended immobilization)   [] Transferred from outside facility (Rehab or Long term care)  [] Age </= to 50

## 2020-01-29 NOTE — SWALLOW BEDSIDE ASSESSMENT ADULT - SPECIFY REASON(S)
to assess speech, language, and cognitive skills
to assess the swallow mechanism; r/o dysphagia
to assess the swallow mechanism; r/o dysphagia.
to assess the swallow mechanism; r/o dysphagia.

## 2020-01-29 NOTE — PROGRESS NOTE ADULT - SUBJECTIVE AND OBJECTIVE BOX
[facility] NUTRITION SUPPORT / TPN -- FOLLOW UP NOTE  --------------------------------------------------------------------------------    24 hour events/subjective:          Diet:  Diet, NPO (01-28-20 @ 11:58)      Appetite: [  ]Poor [  ]Adequate [  ]Good  Caloric intake:  [   ]  Adequate   [   ] Inadequate    ROS: General/ GI see HPI  all other systems negative  pt unable to offer    ALLERGIES & MEDICATIONS  --------------------------------------------------------------------------------  ALLERGIES  Allergies    Allergy Status Unknown    Intolerances        INTOLERANCES    STANDING INPATIENT MEDICATIONS    ARIPiprazole 2 milliGRAM(s) Oral daily  aspirin Suppository 300 milliGRAM(s) Rectal daily  atorvastatin 80 milliGRAM(s) Oral at bedtime  enoxaparin Injectable 30 milliGRAM(s) SubCutaneous daily  ergocalciferol 12635 Unit(s) Oral every week  levothyroxine Injectable 62.5 MICROGram(s) IV Push at bedtime  niCARdipine Infusion 5 mG/Hr IV Continuous <Continuous>  OLANZapine Injectable 5 milliGRAM(s) IntraMuscular once  sodium chloride 0.9%. 1000 milliLiter(s) IV Continuous <Continuous>  valproate sodium IVPB 500 milliGRAM(s) IV Intermittent two times a day      PRN INPATIENT MEDICATION  labetalol Injectable 10 milliGRAM(s) IV Push three times a day PRN  levalbuterol Inhalation 0.63 milliGRAM(s) Inhalation every 6 hours PRN        VITALS/PHYSICAL EXAM  --------------------------------------------------------------------------------  T(C): 36.6 (01-29-20 @ 07:46), Max: 36.7 (01-28-20 @ 20:00)  HR: 96 (01-29-20 @ 12:00) (75 - 118)  BP: 158/57 (01-29-20 @ 12:00) (108/80 - 171/63)  RR: 16 (01-29-20 @ 12:00) (11 - 31)  SpO2: 99% (01-29-20 @ 12:00) (96% - 100%)  Wt(kg): --        01-28-20 @ 07:01  -  01-29-20 @ 07:00  --------------------------------------------------------  IN: 1880 mL / OUT: 800 mL / NET: 1080 mL            LABS/ CULTURES/ RADIOLOGY:              11.7   8.48  >-----------<  125      [01-29-20 @ 04:29]              41.0         147  |  115  |  59  ----------------------------<  90      [01-29-20 @ 04:29]  4.2   |  17  |  1.27        Ca     9.0 mg/dL  [01-29-20 @ 04:29]      Protein Total, Serum: 7.2 g/dL    Albumin, Serum: 3.8 g/dL    Bilirubin Total, Serum: 0.6 mg/dL    Alkaline Phosphatase, Serum: 97 U/L    Aspartate Aminotransferase (AST/SGOT): 16 U/L    Alanine Aminotransferase (ALT/SGPT): 7 U/L    < from: CT Head No Cont (01.28.20 @ 12:13) >    FINDINGS:  Redemonstrated are bilateral MCA territory infarcts with small area of hemorrhage within the left frontal region, unchanged. Age-appropriate involutional changes and moderate microvascular ischemic changes are similar in appearance.    No hydrocephalus, midline shift or extra-axial collections are identified.    The visualized paranasalsinuses and tympanomastoid cavities are free of acute disease.    Impression:    Unchanged bilateral MCA territory infarcts with stable hemorrhage within the left frontal lobe.    < end of copied text >

## 2020-01-29 NOTE — PROGRESS NOTE ADULT - ASSESSMENT
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    developed flash pulmonary edema/ hypertensive emergency, had  hypoxemia last night 2/2 flash pulmonary edema. has had cta neck as well.  now with ROMERO/chf       1- ckD III   2-HTN urgency/emergency during hosp   3- hypernatremia  4- acidosis       cr seems baseline  cont gentle ivf with NS for now unless na rises further   trend bicarb. may also be starvation ketoacidosis as well.   GOC   d/w stroke team     s/p lasix   trend cr  cr seems to be reaching a plateau  cardene drip   labetalol iv prn

## 2020-01-29 NOTE — SWALLOW BEDSIDE ASSESSMENT ADULT - SWALLOW EVAL: FUNCTIONAL LEVEL AT TIME OF EVAL
Awake, alert. Severe dysarthria. Attentive to clinician.
Awake, alert. Generally cooperative with exam although unable to actively follow directives.

## 2020-01-29 NOTE — PROGRESS NOTE ADULT - ASSESSMENT
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 Palliative care was called for GOC.

## 2020-01-29 NOTE — SWALLOW BEDSIDE ASSESSMENT ADULT - PHARYNGEAL PHASE
Decreased laryngeal elevation/Delayed pharyngeal swallow/Wet vocal quality post oral intake Delayed pharyngeal swallow/Wet vocal quality post oral intake/Decreased laryngeal elevation

## 2020-01-29 NOTE — PROGRESS NOTE ADULT - SUBJECTIVE AND OBJECTIVE BOX
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 LKN after interviewing several family members in person and over the phone is 5:00 PM on 1/22/2020. At baseline she ambulates with a walker and sometimes needs 1 person assist. At 5:00 PM on 1/22/2020, she was seen ambulating at her baseline and speaking, and then acutely became weaker (was not even able to stand up from couch) and became confused, she was asking odd questions and was disoriented. The morning of presentation, she was also noted to have a R facial droop, and family called EMS after she was seen ambulating to the bathroom (unclear if she was weak then), and then she was found down in the bathroom and was unable to get up. Fall itself was not witnessed. She was also found in the ED to be in Afib.   Per family patient is DNR/DNI. Given LKN > 4 hrs prior, was excluded from tPa  She has no LVO, is not an endovascular candidate.   NIHSS 17, MRS 4 (23 Jan 2020 11:37)    Subjective/Objective: The patient was seen and evaluated. Today, she was more alert but still with aphasic and only f/u very simple commands (open your mouth, do you have pain).      PERTINENT PM/SXH:   See above        FAMILY HISTORY:  Not able to indicate due to advanced dementia    ITEMS NOT CHECKED ARE NOT PRESENT    SOCIAL HISTORY:   Significant other/partner[ ]    Children[ ]  yes Caodaism/Spirituality: Zoroastrian   Substance hx:  [ ]   Tobacco hx:  [ ]   Alcohol hx: [ ]   Home Opioid hx:  [ ] I-Stop Reference No:  Living Situation: [ x]Home  [ ]Long term care  [ ]Rehab [ ]Other   As per care coordination notes: " Per RN, patient is non-verbal. LMSW met with patients son/emergency contact,  Jeromy Nguyen ([phone]) at bedside to complete assessment. Patient lives  with son and daughter-in-law in a private residence in [place] (3 steps  inside and 14 stairs inside). Per son, patient requires assistance with all  ADLs and ambulates with walker. Patient also has a cane, wheelchair, and shower  chair at home. Per son, someone is home with patient 24/7 to provide care, and  the family also private hires [facility] as needed. Per son, patient with HCP.  Patients son provided HCP to this  which LMSW scanned into [facility] and  placed in patients chart. Patients son, Jeromy Nguyen identified as primary  health care agent. Patients daughter in law, Alejandra Nguyen identified as  secondary agent.     Patients discharge plan is unclear and is pending hospital course.  Patient  with [employer]/Medicare insurance.  provided contact information and  assured availability."  ADVANCE DIRECTIVES:    DNR  Yes  MOLST  [ ]  Living Will  [ ]   DECISION MAKER(s):  [x ] Health Care Proxy(s)  [ ] Surrogate(s)  [ ] Guardian           Name(s): Phone Number(s): Jeromy Nguyen ([phone])and the patient's daughter Vivienne.     BASELINE (I)ADL(s) (prior to admission):  [place]: [ ]Total  [ ] Moderate [x ]Dependent    Allergies    Allergy Status Unknown    Intolerances    MEDICATIONS  (STANDING):  ARIPiprazole 2 milliGRAM(s) Oral daily  aspirin Suppository 300 milliGRAM(s) Rectal daily  atorvastatin 80 milliGRAM(s) Oral at bedtime  enoxaparin Injectable 30 milliGRAM(s) SubCutaneous daily  ergocalciferol 73288 Unit(s) Oral every week  levothyroxine Injectable 62.5 MICROGram(s) IV Push at bedtime  niCARdipine Infusion 5 mG/Hr (25 mL/Hr) IV Continuous <Continuous>  OLANZapine Injectable 5 milliGRAM(s) IntraMuscular once  sodium chloride 0.9%. 1000 milliLiter(s) (40 mL/Hr) IV Continuous <Continuous>  valproate sodium IVPB 500 milliGRAM(s) IV Intermittent two times a day    MEDICATIONS  (PRN):  acetaminophen  Suppository .. 650 milliGRAM(s) Rectal every 6 hours PRN Mild Pain (1 - 3)  labetalol Injectable 10 milliGRAM(s) IV Push three times a day PRN SBP > 180  levalbuterol Inhalation 0.63 milliGRAM(s) Inhalation every 6 hours PRN Shortness of breath/Wheezing      PRESENT SYMPTOMS: [x ]Unable to obtain due to poor mentation   Source if other than patient:  [ ]Family   [ ]Team     Pain: [x ]yes [ ]no  QOL impact - not able to indicate   Location -    not able to indicate                 Aggravating factors -not able to indicate   Quality -not able to indicate   Radiation -not able to indicate   Timing-not able to indicate   Severity (0-10 scale):not able to indicate   Minimal acceptable level (0-10 scale): not able to indicate   PAIN AD Score: 2     [email or web address]/[email or web address] (press ctrl +  left click to view)    Dyspnea:                           [ ]Mild [ ]Moderate [ ]Severe  Anxiety:                             [ ]Mild [ ]Moderate [ ]Severe  Fatigue:                             [ ]Mild [ ]Moderate [ ]Severe  Nausea:                             [ ]Mild [ ]Moderate [ ]Severe  Loss of appetite:              [ ]Mild [ ]Moderate [ ]Severe  Constipation:                    [ ]Mild [ ]Moderate [ ]Severe    Other Symptoms:  [ ]All other review of systems negative     Palliative Performance Status Version 2:   20      %    http://[email or web address].org/files/news/palliative_performance_scale_[email or web address]  PHYSICAL EXAM:  Vital Signs Last 24 Hrs  T(C): 36.7 (27 Jan 2020 16:42), Max: 37 (27 Jan 2020 07:07)  T(F): 98 (27 Jan 2020 16:42), Max: 98.6 (27 Jan 2020 07:07)  HR: 93 (27 Jan 2020 18:00) (62 - 119)  BP: 171/70 (27 Jan 2020 18:00) (106/52 - 174/78)  BP(mean): 98 (27 Jan 2020 18:00) (13 - 111)  RR: 15 (27 Jan 2020 18:00) (12 - 33)  SpO2: 97% (27 Jan 2020 18:00) (92% - 100%) I&O's Summary    26 Jan 2020 07:01  -  27 Jan 2020 07:00  --------------------------------------------------------  IN: 0 mL / OUT: 500 mL / NET: -500 mL    27 Jan 2020 07:01  -  27 Jan 2020 19:52  --------------------------------------------------------  IN: 0 mL / OUT: 400 mL / NET: -400 mL      GENERAL:  [x ]Alert  [ ]Oriented x   [ ]Lethargic  [ ]Cachexia  [ ]Unarousable  [x ]uninteligible sounds  [ ]Non-Verbal  Behavioral:   [ ] Anxiety  [ ] Delirium [ ] Agitation [ ] Other  HEENT:  [ ]Normal   [x ]Dry mouth   [ ]ET Tube/Trach  [ ]Oral lesions  PULMONARY:   [x ]Clear [ ]Tachypnea  [ ]Audible excessive secretions   [ ]Rhonchi        [ ]Right [ ]Left [ ]Bilateral  [ ]Crackles        [ ]Right [ ]Left [ ]Bilateral  [ ]Wheezing     [ ]Right [ ]Left [ ]Bilatera  [ ]Diminished breath sounds [ ]right [ ]left [ ]bilateral  CARDIOVASCULAR:    [x ]Regular [ ]Irregular [ ]Tachy  [ ]Hung [ ]Murmur [ ]Other  GASTROINTESTINAL:  [x ]Soft  [ ]Distended   [ ]+BS  [ ]Non tender [ ]Tender  [ ]PEG [ ]OGT/ NGT  Last BM: 1/28    GENITOURINARY:  [ ]Normal [ ] Incontinent   [ ]Oliguria/Anuria   [x ]Siddiqi  MUSCULOSKELETAL:   [ ]Normal   [x ]Weakness  [x ]Bed/Wheelchair bound [ ]Edema  NEUROLOGIC:   [ ]No focal deficits  [x ] severe Cognitive impairment  [x ]Dysphagia [x ]Dysarthria [ ]Paresis [ ]Other   SKIN:   [x ]Normal    [ ]Rash  [ ]Pressure ulcer(s)       Present on admission [ ]y [ ]n    CRITICAL CARE:  [ ] Shock Present  [ ]Septic [ ]Cardiogenic [ ]Neurologic [ ]Hypovolemic  [ ]  Vasopressors [ ]  Inotropes   [ ]Respiratory failure present [ ]Mechanical ventilation [ ]Non-invasive ventilatory support [ ]High flow  [ ]Acute  [ ]Chronic [ ]Hypoxic  [ ]Hypercarbic [ ]Other  [ ]Other organ failure     LABS:                                     11.7   8.48  )-----------( 125      ( 29 Jan 2020 04:29 )             41.0   01-29    147<H>  |  115<H>  |  59<H>  ----------------------------<  90  4.2   |  17<L>  |  1.27    Ca    9.0      29 Jan 2020 04:29            RADIOLOGY & ADDITIONAL STUDIES:  < from: CT Head No Cont (01.28.20 @ 12:13) >  EXAM:  CT BRAIN                            PROCEDURE DATE:  01/28/2020  Impression:    Unchanged bilateral MCA territory infarcts with stable hemorrhage within the left frontal lobe.    < end of copied text >    PROTEIN CALORIE MALNUTRITION PRESENT: [ ]mild [ ]moderate [ ]severe [ ]underweight [ ]morbid obesity  https://www.andeal.org/vault/2440/web/files/ONC/Table_Clinical%20Characteristics%20to%20Document%20Malnutrition-White%20JV%20et%20al%202012.pdf    Height (cm): 152.4 (01-23-20 @ 13:10)  Weight (kg): 74 (01-23-20 @ 13:10)  BMI (kg/m2): 31.9 (01-23-20 @ 13:10)    [ ]PPSV2 < or = to 30% [ ]significant weight loss  [ ]poor nutritional intake  [ ]anasarca     Albumin, Serum: 3.8 g/dL (01-23-20 @ 10:25)   [ ]Artificial Nutrition      REFERRALS:   [ ]Chaplaincy  [ ]Hospice  [ ]Child Life  [ ]Social Work  [ ]Case management [ ]Holistic Therapy     Goals of Care Document:

## 2020-01-29 NOTE — SWALLOW BEDSIDE ASSESSMENT ADULT - ORAL PREPARATORY PHASE
Reduced oral grading/Bolus falls into anterior sulcus/Lateral loss of bolus/Anterior loss of bolus Reduced oral grading

## 2020-01-29 NOTE — SWALLOW BEDSIDE ASSESSMENT ADULT - SLP GENERAL OBSERVATIONS
Pt positioned upright for po trials. Oral cavity dry anteriorally; +thick secretions on posterior tongue and palate. Attentive; not actively following commands however passively cooperative.
Pt awake, alert. Gross vocalizations, able to state "want to stand up", although extremely poor intelligibility overall. Generally cooperative but unable to follow directives.

## 2020-01-29 NOTE — SWALLOW BEDSIDE ASSESSMENT ADULT - SWALLOW EVAL: DIAGNOSIS
Bedside evaluation completed. Report to follow. Patient known to this service from previous evaluations, continues to present with 1)oropharyngeal dysphagia. Poor oral management, anterior loss of conservative textures, reduced oral transit and clearance as well as suspected premature loss to the pharynx. Delayed swallow trigger and wet, gurgly breath sounds are noted on tsp administration of puree, honey and ice chip, suggestive of laryngeal penetration/aspiration. No reflexive cough response noted. Patient is unable to follow directives for strategies at this time. 2)dysarthria with poor intelligibility in known contexts 3)cognitive linguistic impairment 4)aphasia

## 2020-01-29 NOTE — SWALLOW BEDSIDE ASSESSMENT ADULT - ORAL PHASE
Decreased anterior-posterior movement of the bolus/Delayed oral transit time/Stasis in lateral sulci/Stasis in anterior sulcus/Lingual stasis Delayed oral transit time/Stasis in anterior sulcus/Decreased anterior-posterior movement of the bolus

## 2020-01-29 NOTE — PROGRESS NOTE ADULT - PROBLEM SELECTOR PLAN 1
Repeat CT shows evolving LEft sided CVA with hemorraghic transformation. Neuro is following  Palliative eval done  Swallowing eval done, suggests non oral means of feeding, such as NGT or PEG.  Will see if family agrees

## 2020-01-29 NOTE — SWALLOW BEDSIDE ASSESSMENT ADULT - ASR SWALLOW LINGUAL MOBILITY
impaired left lateral movement/impaired right lateral movement/impaired protrusion/impaired anterior elevation
impaired protrusion/impaired anterior elevation/impaired left lateral movement/impaired right lateral movement

## 2020-01-29 NOTE — PROGRESS NOTE ADULT - SUBJECTIVE AND OBJECTIVE BOX
Patient is a 89y old  Female who presents with a chief complaint of stroke (2020 12:19)      HPI:  Awake, non verbal, slurred speech. Moves all extremities  BP svariable  Agitated at times  PAST MEDICAL & SURGICAL HISTORY:      Review of Systems:   CONSTITUTIONAL: No fever, weight loss, or fatigue  EYES: No eye pain, visual disturbances, or discharge  ENMT:  No difficulty hearing, tinnitus, vertigo; No sinus or throat pain  NECK: No pain or stiffness  BREASTS: No pain, masses, or nipple discharge  RESPIRATORY: No cough, wheezing, chills or hemoptysis; No shortness of breath  CARDIOVASCULAR: No chest pain, palpitations, dizziness, or leg swelling  GASTROINTESTINAL: No abdominal or epigastric pain. No nausea, vomiting, or hematemesis; No diarrhea or constipation. No melena or hematochezia.  GENITOURINARY: No dysuria, frequency, hematuria, or incontinence  NEUROLOGICAL: HPI  SKIN: No itching, burning, rashes, or lesions   LYMPH NODES: No enlarged glands  ENDOCRINE: No heat or cold intolerance; No hair loss  MUSCULOSKELETAL: No joint pain or swelling; No muscle, back, or extremity pain  PSYCHIATRIC: No depression, anxiety, mood swings, or difficulty sleeping  HEME/LYMPH: No easy bruising, or bleeding gums  ALLERY AND IMMUNOLOGIC: No hives or eczema    Allergies    Allergy Status Unknown    Intolerances        Social History:     FAMILY HISTORY:      MEDICATIONS  (STANDING):  ARIPiprazole 2 milliGRAM(s) Oral daily  aspirin Suppository 300 milliGRAM(s) Rectal daily  atorvastatin 80 milliGRAM(s) Oral at bedtime  enoxaparin Injectable 30 milliGRAM(s) SubCutaneous daily  ergocalciferol 14093 Unit(s) Oral every week  levothyroxine Injectable 62.5 MICROGram(s) IV Push at bedtime  sodium chloride 0.9%. 1000 milliLiter(s) (50 mL/Hr) IV Continuous <Continuous>  valproate sodium IVPB 500 milliGRAM(s) IV Intermittent two times a day    MEDICATIONS  (PRN):  labetalol Injectable 10 milliGRAM(s) IV Push three times a day PRN SBP > 180  levalbuterol Inhalation 0.63 milliGRAM(s) Inhalation every 6 hours PRN Shortness of breath/Wheezing        CAPILLARY BLOOD GLUCOSE        I&O's Summary    2020 07:01  -  2020 07:00  --------------------------------------------------------  IN: 335 mL / OUT: 2890 mL / NET: -2555 mL        PHYSICAL EXAM:  Vital Signs Last 24 Hrs  T(C): 36.6 (2020 20:00), Max: 38.3 (2020 07:46)  T(F): 97.8 (2020 20:00), Max: 101 (2020 07:46)  HR: 67 (2020 20:00) (62 - 128)  BP: 173/51 (2020 20:00) (137/125 - 195/62)  BP(mean): 87 (2020 20:00) (75 - 184)  RR: 16 (2020 20:00) (13 - 26)  SpO2: 97% (2020 20:00) (91% - 100%)    GENERAL: NAD, well-developed  HEAD:  Atraumatic, Normocephalic  EYES: EOMI, PERRLA, conjunctiva and sclera clear  NECK: Supple, No JVD  CHEST/LUNG: Clear to auscultation bilaterally; No wheeze  HEART: Regular rate and rhythm; No murmurs, rubs, or gallops  ABDOMEN: Soft, Nontender, Nondistended; Bowel sounds present  EXTREMITIES:  2+ Peripheral Pulses, No clubbing, cyanosis, or edema  PSYCH: AAOx3  NEUROLOGY: non-focal  SKIN: No rashes or lesions    LABS:                        10.4   6.40  )-----------( 114      ( 2020 04:52 )             32.9     01-24    139  |  99  |  47<H>  ----------------------------<  153<H>  4.6   |  21<L>  |  1.57<H>    Ca    9.2      2020 04:52    TPro  7.2  /  Alb  3.8  /  TBili  0.6  /  DBili  x   /  AST  16  /  ALT  7<L>  /  AlkPhos  97  01-23    PT/INR - ( 2020 10:25 )   PT: 12.0 sec;   INR: 1.05 ratio         PTT - ( 2020 10:25 )  PTT:30.9 sec      Urinalysis Basic - ( 2020 16:48 )    Color: Light Yellow / Appearance: Clear / S.030 / pH: x  Gluc: x / Ketone: Negative  / Bili: Negative / Urobili: Negative   Blood: x / Protein: Trace / Nitrite: Negative   Leuk Esterase: Negative / RBC: 15 /hpf / WBC 0 /HPF   Sq Epi: x / Non Sq Epi: 0 /hpf / Bacteria: Negative        RADIOLOGY & ADDITIONAL TESTS:    Imaging Personally Reviewed:    Consultant(s) Notes Reviewed:      Care Discussed with Consultants/Other Providers:

## 2020-01-29 NOTE — SWALLOW BEDSIDE ASSESSMENT ADULT - ADDITIONAL RECOMMENDATIONS
HOB >30 all times HOB >30 degrees at all times.  Humidified air via face tent is recommended for comfort and hygiene. Patient is with open mouth posture, extremely dry oral mucosa.

## 2020-01-29 NOTE — PROGRESS NOTE ADULT - SUBJECTIVE AND OBJECTIVE BOX
THE PATIENT WAS SEEN AND EXAMINED BY ME WITH THE HOUSESTAFF AND STROKE TEAM DURING MORNING ROUNDS.   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 Last reported normal 5:00 PM on 1/22/2020. At baseline she ambulates with a walker and sometimes needs 1 person assist. At 5:00 PM on 1/22/2020, she was seen ambulating at her baseline and speaking, and then acutely became weaker (was not even able to stand up from couch) and became confused, she was asking odd questions and was disoriented. On day of admission she was also noted to have a R facial droop, and family called EMS after she was seen ambulating to the bathroom (unclear if she was weak then), and then she was found down in the bathroom and was unable to get up. Per family patient is DNR/DNI. Given LKN > 4 hrs prior, was excluded from tPa. She has no large vessel occlusion: is not an endovascular candidate. NIHSS 17, MRS 4    SUBJECTIVE: No events overnight.  No new neurologic complaints.      ARIPiprazole 2 milliGRAM(s) Oral daily  aspirin Suppository 300 milliGRAM(s) Rectal daily  atorvastatin 80 milliGRAM(s) Oral at bedtime  enoxaparin Injectable 30 milliGRAM(s) SubCutaneous daily  ergocalciferol 47251 Unit(s) Oral every week  labetalol Injectable 10 milliGRAM(s) IV Push three times a day PRN  levalbuterol Inhalation 0.63 milliGRAM(s) Inhalation every 6 hours PRN  levothyroxine Injectable 62.5 MICROGram(s) IV Push at bedtime  niCARdipine Infusion 5 mG/Hr IV Continuous <Continuous>  OLANZapine Injectable 5 milliGRAM(s) IntraMuscular once  sodium chloride 0.9%. 1000 milliLiter(s) IV Continuous <Continuous>  valproate sodium IVPB 500 milliGRAM(s) IV Intermittent two times a day      PHYSICAL EXAM:   Vital Signs Last 24 Hrs  T(C): 36.6 (29 Jan 2020 07:46), Max: 36.7 (28 Jan 2020 20:00)  T(F): 97.8 (29 Jan 2020 07:46), Max: 98.1 (28 Jan 2020 20:00)  HR: 109 (29 Jan 2020 10:00) (75 - 118)  BP: 159/53 (29 Jan 2020 10:00) (108/80 - 187/99)  BP(mean): 84 (29 Jan 2020 10:00) (62 - 143)  RR: 17 (29 Jan 2020 10:00) (11 - 31)  SpO2: 100% (29 Jan 2020 10:00) (96% - 100%)    General: No acute distress  HEENT: EOM intact, attending to family on right and left   Abdomen: Soft, nontender, nondistended   Extremities: No edema    NEUROLOGICAL EXAM:  Mental status: eyes open, generates unintelligible sounds, able to follow a simple command (stick out tongue) with family  Cranial Nerves: Right nasolabial flattening, no nystagmus., attending to family on both right and left oculocephalic intact  Motor exam: Normal tone,  moves all extremities spontaneously in bed  Sensation: appears Intact to noxious stimuli  Coordination/ Gait: unable to participate with exam     LABS:                        11.7   8.48  )-----------( 125      ( 29 Jan 2020 04:29 )             41.0    01-29    147<H>  |  115<H>  |  59<H>  ----------------------------<  90  4.2   |  17<L>  |  1.27    Ca    9.0      29 Jan 2020 04:29      Hemoglobin A1C, Whole Blood: 5.2 % (01-23 @ 21:03)      IMAGING: Reviewed by me.   CT Head No Cont (01.28.20)  Unchanged bilateral MCA territory infarcts with stable hemorrhage within the left frontal lobe.    Head CT (01/26/20) Evolving left MCA infarct, more pronounced decreased attenuation, loss of gray-white distinction and left frontal, temporal, and parietal lobes, unchanged foci of hemorrhagic transformation. Redemonstration of remote right cerebral hemisphere infarcts, microvascular disease, lacunar infarcts, no new extra axial collection or midline shift    Head CT (01/25/20) Evolving left MCA infarct with hemorrhagic transformation. Redemonstration remote infarcts, right lateral ventricle ex vacuo enlargement, microvascular disease, lacunar infarcts, volume loss loss, no new midline shift.      EEG (01/24/20) Potential epileptogenic foci in the left frontal, right frontal, right temporal, right parietal and right occipital regions. Moderate nonspecific diffuse or multifocal cerebral dysfunction.  No seizure seen    Head CT (01/23/20) No evidence of acute intracranial pathology. Chronic bilateral MCA territory infarcts.    CTA Head/Neck (01/23/20) CTA neck: No large vessel occlusion or major stenosis.    CTA head: No large vessel occlusion or major stenosis. 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 No prevertebral soft tissue swelling. Degenerative changes

## 2020-01-29 NOTE — SWALLOW BEDSIDE ASSESSMENT ADULT - ASR SWALLOW LABIAL MOBILITY
impaired coordination/impaired seal/impaired retraction/impaired pursing
impaired retraction/impaired pursing/impaired seal/impaired coordination

## 2020-01-29 NOTE — SWALLOW BEDSIDE ASSESSMENT ADULT - SWALLOW EVAL: ORAL MUSCULATURE
anomalies present
right sided weakness based on oral control of bolus/unable to assess due to poor participation/comprehension

## 2020-01-29 NOTE — PROGRESS NOTE ADULT - PROBLEM SELECTOR PLAN 1
Met with the patient's son in law (that is Neurosurgeon at [facility]), the patient's son (Jeromy Nguyen) and the patient's daughter Vivienne. In summary, the patient's son in law indicated that from his point of view the patient needed to receive some source of nutrition; otherwise, the patient was going to be "starving to death." He also indicate he was under the impression that therapies such as TPN can be provided for patients with clinical situations like Mrs. Nguyen. After her the son in law provided his inputs I further discussed with his family about the most common scenarios where TPN is provided which are usually associated to medical issues where treatment will present a bridge for recovery (in this case it is unlikely the patient will regain her swallowing capacity anytime soon if ever). Furthermore, that TPN is provided as a last resources when no alternative route of nutrition are available. Anyway, a consult for TPN was already done and their input supported alternative ways of nutrition other than TPN. I also indicated that beyond the concept of starvation (which really applies to cases where the patient expresses a desire for eating but nutrition is being withheld), the center of the discussion was really the patient's desire for prolonging her life under the current circumstances (with aphasia, dysphagia, functional quadriplegia, congnitive impairment, and not able to pariticipate on her own ADLs or even self care).   After our discussion, the HCPs agree on another speech and swallow eval and if able to pass it then starting dysphagia diet and if not then starting a NGT feeds trial (probably up to 5-7 days). If a NGT trial is needed and the patient does not improve beyond his actual current condition (improve neurologic status and capacity to swallow) his family will then be open to a f/u GOC discussion to re-address nutritional goals.

## 2020-01-29 NOTE — CONSULT NOTE ADULT - ATTENDING COMMENTS
Appreciate neurologic care  Cardene for now  No a/c due to ICH    Marina [id number]
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  -The patient has refused a feeding tube in the past, so PEG does not seem appropriate.  -The patient is pulling out IV catheters, so we offered a temporary bridled NG feeding tube which the family desires. Unfortunately, the bridles are not in stock at the hospital, so we will attempt to special order them tomorrow.
Patient seen and examined, data and ultrasound imaging reviewed.  Patient with acute delirium and concern for acute CVA, presents with wheezing in the setting of agitation and elevated BP.  Expiratory wheezing noted bilaterally on exam.  There is no inspiratory stridor at this time.  Bedside ultrasound shows "blines" diffusely consistent with pulmonary edema- cardiogenic or noncardiogenic, possibly aspiration pneumonitis.  Recommend Haldol to calm her without sedating her so that Neuro exam can be followed.  Would obtain echo, rule out for MI and obtain CXR portable.  BP management as per neurology.  MICU team consulted.

## 2020-01-30 NOTE — PROGRESS NOTE ADULT - ASSESSMENT
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     TPN Consult requested for pt who has not eaten in 1wk  Pt is not a suitable candidate at this time.  Pt has a functional GI tract, this is the preferable means of nutrition.        Risks far outweigh the benefits  Family is interested in KO feeding tube w/ Bridle to secure tube  Palliative and Speech & Swallow to follow up  Ongoing GOC discussion- Pt had not wanted a PEG in the past  Pt is NPO/ IVF    In order to nutritionally optimize pt:  - HyperNa, Elevated BUN/Crt- Consider switching pt to D5W         Consider Urine 'Lytes          Renal f/u appreciated as pt is CKDIII  - Daily CMP, Mg, Ionized Ca, Phosphorus daily       Weekly Triglycerides & Prealbumin  - Pt with h/o Diabetes- FS every 6 hours w/ with ISS coverage- to be ordered by primary team  - Strict Intake and Output.  - Weights as per protocol  - Continue as per Medicine, will follow with you  D/w primary team    Dayna Taylor PA-C  TPN team, pager [phone]  D/w Jen Alberto

## 2020-01-30 NOTE — CHART NOTE - NSCHARTNOTEFT_GEN_A_CORE
Pt with elevated BP today up to 191/85. On Nicardipine drip. Discussed wit Dr Arriaga. Pt started on Clonidine Patch and NTG Patch,  and Labetalol dose increased. Bp improved to 140/66 at 6:35 pm. Nicardipine drip d/cd.  Pt with elevated Na to 157 today. Started on D5W. D/w Nephrologist Dr Pool who recommended increasing D5W to 70 cc/hr.   Pt npo. Awaiting Kaofjonelle with [facility]. D/w TPN PA who is in process of ordering tube which will be inserted and pt started on feeds when tube arrives.  Endorsed to Night Provider for further management.

## 2020-01-30 NOTE — PROGRESS NOTE ADULT - PROBLEM SELECTOR PLAN 2
Not a candidate for any interventions.  There may be some components of Delirium, therefore, promote:   -Frequent reassurance and verbal orientation   -Family members or other familiar persons by his bedside.   -Delusions and hallucinations should be neither endorsed nor challenged.   -Physical restraints should be avoided. Alternatives to restraint use, such as constant observation (preferably by someone familiar to the patient such as a family member), may be more effective.  -PT eval   -Maintain in room with a window

## 2020-01-30 NOTE — PROGRESS NOTE ADULT - SUBJECTIVE AND OBJECTIVE BOX
Patient is a 89y old  Female who presents with a chief complaint of stroke (2020 12:19)      HPI:  Awake, non verbal, slurred speech. Moves all extremities  BP svariable  Agitated at times  PAST MEDICAL & SURGICAL HISTORY:      Review of Systems:   CONSTITUTIONAL: No fever, weight loss, or fatigue  EYES: No eye pain, visual disturbances, or discharge  ENMT:  No difficulty hearing, tinnitus, vertigo; No sinus or throat pain  NECK: No pain or stiffness  BREASTS: No pain, masses, or nipple discharge  RESPIRATORY: No cough, wheezing, chills or hemoptysis; No shortness of breath  CARDIOVASCULAR: No chest pain, palpitations, dizziness, or leg swelling  GASTROINTESTINAL: No abdominal or epigastric pain. No nausea, vomiting, or hematemesis; No diarrhea or constipation. No melena or hematochezia.  GENITOURINARY: No dysuria, frequency, hematuria, or incontinence  NEUROLOGICAL: HPI  SKIN: No itching, burning, rashes, or lesions   LYMPH NODES: No enlarged glands  ENDOCRINE: No heat or cold intolerance; No hair loss  MUSCULOSKELETAL: No joint pain or swelling; No muscle, back, or extremity pain  PSYCHIATRIC: No depression, anxiety, mood swings, or difficulty sleeping  HEME/LYMPH: No easy bruising, or bleeding gums  ALLERY AND IMMUNOLOGIC: No hives or eczema    Allergies    Allergy Status Unknown    Intolerances        Social History:     FAMILY HISTORY:      MEDICATIONS  (STANDING):  ARIPiprazole 2 milliGRAM(s) Oral daily  aspirin Suppository 300 milliGRAM(s) Rectal daily  atorvastatin 80 milliGRAM(s) Oral at bedtime  enoxaparin Injectable 30 milliGRAM(s) SubCutaneous daily  ergocalciferol 66818 Unit(s) Oral every week  levothyroxine Injectable 62.5 MICROGram(s) IV Push at bedtime  sodium chloride 0.9%. 1000 milliLiter(s) (50 mL/Hr) IV Continuous <Continuous>  valproate sodium IVPB 500 milliGRAM(s) IV Intermittent two times a day    MEDICATIONS  (PRN):  labetalol Injectable 10 milliGRAM(s) IV Push three times a day PRN SBP > 180  levalbuterol Inhalation 0.63 milliGRAM(s) Inhalation every 6 hours PRN Shortness of breath/Wheezing        CAPILLARY BLOOD GLUCOSE        I&O's Summary    2020 07:01  -  2020 07:00  --------------------------------------------------------  IN: 335 mL / OUT: 2890 mL / NET: -2555 mL        PHYSICAL EXAM:  Vital Signs Last 24 Hrs  T(C): 36.6 (2020 20:00), Max: 38.3 (2020 07:46)  T(F): 97.8 (2020 20:00), Max: 101 (2020 07:46)  HR: 67 (2020 20:00) (62 - 128)  BP: 173/51 (2020 20:00) (137/125 - 195/62)  BP(mean): 87 (2020 20:00) (75 - 184)  RR: 16 (2020 20:00) (13 - 26)  SpO2: 97% (2020 20:00) (91% - 100%)    GENERAL: NAD, well-developed  HEAD:  Atraumatic, Normocephalic  EYES: EOMI, PERRLA, conjunctiva and sclera clear  NECK: Supple, No JVD  CHEST/LUNG: Clear to auscultation bilaterally; No wheeze  HEART: Regular rate and rhythm; No murmurs, rubs, or gallops  ABDOMEN: Soft, Nontender, Nondistended; Bowel sounds present  EXTREMITIES:  2+ Peripheral Pulses, No clubbing, cyanosis, or edema  PSYCH: AAOx3  NEUROLOGY: non-focal  SKIN: No rashes or lesions    LABS:                        10.4   6.40  )-----------( 114      ( 2020 04:52 )             32.9     01-24    139  |  99  |  47<H>  ----------------------------<  153<H>  4.6   |  21<L>  |  1.57<H>    Ca    9.2      2020 04:52    TPro  7.2  /  Alb  3.8  /  TBili  0.6  /  DBili  x   /  AST  16  /  ALT  7<L>  /  AlkPhos  97  01-23    PT/INR - ( 2020 10:25 )   PT: 12.0 sec;   INR: 1.05 ratio         PTT - ( 2020 10:25 )  PTT:30.9 sec      Urinalysis Basic - ( 2020 16:48 )    Color: Light Yellow / Appearance: Clear / S.030 / pH: x  Gluc: x / Ketone: Negative  / Bili: Negative / Urobili: Negative   Blood: x / Protein: Trace / Nitrite: Negative   Leuk Esterase: Negative / RBC: 15 /hpf / WBC 0 /HPF   Sq Epi: x / Non Sq Epi: 0 /hpf / Bacteria: Negative        RADIOLOGY & ADDITIONAL TESTS:    Imaging Personally Reviewed:    Consultant(s) Notes Reviewed:      Care Discussed with Consultants/Other Providers:

## 2020-01-30 NOTE — PROGRESS NOTE ADULT - SUBJECTIVE AND OBJECTIVE BOX
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 LKN after interviewing several family members in person and over the phone is 5:00 PM on 1/22/2020. At baseline she ambulates with a walker and sometimes needs 1 person assist. At 5:00 PM on 1/22/2020, she was seen ambulating at her baseline and speaking, and then acutely became weaker (was not even able to stand up from couch) and became confused, she was asking odd questions and was disoriented. The morning of presentation, she was also noted to have a R facial droop, and family called EMS after she was seen ambulating to the bathroom (unclear if she was weak then), and then she was found down in the bathroom and was unable to get up. Fall itself was not witnessed. She was also found in the ED to be in Afib.     Per family patient is DNR/DNI. Given LKN > 4 hrs prior, was excluded from tPa  She has no LVO, is not an endovascular candidate.   NIHSS 17, MRS 4 (23 Jan 2020 11:37)    Subjective/Objective: The patient was seen and evaluated with her daughter and son in law by her bedside. Today, she was more alert but still with aphasic and only f/u very simple commands. She appeared to be slightly uncomfortable but it improved with repositioning.     PERTINENT PM/SXH:   See above        FAMILY HISTORY:  Not able to indicate due to advanced dementia    ITEMS NOT CHECKED ARE NOT PRESENT    SOCIAL HISTORY:   Significant other/partner[ ]    Children[ ]  yes Jew/Spirituality: Taoist   Substance hx:  [ ]   Tobacco hx:  [ ]   Alcohol hx: [ ]   Home Opioid hx:  [ ] I-Stop Reference No:  Living Situation: [ x]Home  [ ]Long term care  [ ]Rehab [ ]Other   As per care coordination notes: " Per RN, patient is non-verbal. LMSW met with patients son/emergency contact,  Jeromy Nguyen ([phone]) at bedside to complete assessment. Patient lives  with son and daughter-in-law in a private residence in [place] (3 steps  inside and 14 stairs inside). Per son, patient requires assistance with all  ADLs and ambulates with walker. Patient also has a cane, wheelchair, and shower  chair at home. Per son, someone is home with patient 24/7 to provide care, and  the family also private hires [facility] as needed. Per son, patient with HCP.  Patients son provided HCP to this  which LMSW scanned into [facility] and  placed in patients chart. Patients son, Jeromy Nguyen identified as primary  health care agent. Patients daughter in law, Alejandra Nguyen identified as  secondary agent.     Patients discharge plan is unclear and is pending hospital course.  Patient  with [employer]/Medicare insurance.  provided contact information and  assured availability."  ADVANCE DIRECTIVES:    DNR  Yes  MOLST  [ ]  Living Will  [ ]   DECISION MAKER(s):  [x ] Health Care Proxy(s)  [ ] Surrogate(s)  [ ] Guardian           Name(s): Phone Number(s): Jeromy Nguyen ([phone])and the patient's daughter Vivienne.     BASELINE (I)ADL(s) (prior to admission):  [place]: [ ]Total  [ ] Moderate [x ]Dependent    Allergies    Allergy Status Unknown    Intolerances    MEDICATIONS  (STANDING):  ARIPiprazole 2 milliGRAM(s) Oral daily  aspirin Suppository 300 milliGRAM(s) Rectal daily  atorvastatin 80 milliGRAM(s) Oral at bedtime  cloNIDine Patch 0.1 mG/24Hr(s) 1 patch Transdermal every 7 days  dextrose 5%. 1000 milliLiter(s) (50 mL/Hr) IV Continuous <Continuous>  enoxaparin Injectable 30 milliGRAM(s) SubCutaneous daily  ergocalciferol 71662 Unit(s) Oral every week  levothyroxine Injectable 62.5 MICROGram(s) IV Push at bedtime  niCARdipine Infusion 5 mG/Hr (25 mL/Hr) IV Continuous <Continuous>  nitroglycerin    2% Ointment 0.5 Inch(s) Transdermal daily  OLANZapine Injectable 5 milliGRAM(s) IntraMuscular once  valproate sodium IVPB 500 milliGRAM(s) IV Intermittent two times a day    MEDICATIONS  (PRN):  acetaminophen  Suppository .. 650 milliGRAM(s) Rectal every 6 hours PRN Mild Pain (1 - 3)  labetalol Injectable 20 milliGRAM(s) IV Push every 6 hours PRN SBP > 180  levalbuterol Inhalation 0.63 milliGRAM(s) Inhalation every 6 hours PRN Shortness of breath/Wheezing        PRESENT SYMPTOMS: [x ]Unable to obtain due to poor mentation   Source if other than patient:  [ ]Family   [ ]Team     Pain: [x ]yes [ ]no  QOL impact - not able to indicate   Location -    not able to indicate                 Aggravating factors -not able to indicate   Quality -not able to indicate   Radiation -not able to indicate   Timing-not able to indicate   Severity (0-10 scale):not able to indicate   Minimal acceptable level (0-10 scale): not able to indicate   PAIN AD Score: 2     [email or web address]/[email or web address] (press ctrl +  left click to view)    Dyspnea:                           [ ]Mild [ ]Moderate [ ]Severe  Anxiety:                             [ ]Mild [ ]Moderate [ ]Severe  Fatigue:                             [ ]Mild [ ]Moderate [ ]Severe  Nausea:                             [ ]Mild [ ]Moderate [ ]Severe  Loss of appetite:              [ ]Mild [ ]Moderate [ ]Severe  Constipation:                    [ ]Mild [ ]Moderate [ ]Severe    Other Symptoms:  [ ]All other review of systems negative     Palliative Performance Status Version 2:   20      %    http://npcrc.org/files/news/palliative_performance_scale_ppsv2.pdf  PHYSICAL EXAM:  Vital Signs Last 24 Hrs  T(C): 36.3 (30 Jan 2020 13:45), Max: 36.8 (29 Jan 2020 23:00)  T(F): 97.4 (30 Jan 2020 13:45), Max: 98.2 (29 Jan 2020 23:00)  HR: 85 (30 Jan 2020 16:00) (72 - 117)  BP: 159/91 (30 Jan 2020 16:00) (146/62 - 191/85)  BP(mean): 112 (30 Jan 2020 16:00) (87 - 122)  RR: 13 (30 Jan 2020 16:00) (11 - 20)  SpO2: 100% (30 Jan 2020 16:00) (98% - 100%)    GENERAL:  [x ]Alert  [ ]Oriented x   [ ]Lethargic  [ ]Cachexia  [ ]Unarousable  [x ]uninteligible sounds  [ ]Non-Verbal  Behavioral:   [ ] Anxiety  [ ] Delirium [ ] Agitation [ ] Other  HEENT:  [ ]Normal   [x ]Dry mouth   [ ]ET Tube/Trach  [ ]Oral lesions  PULMONARY:   [x ]Clear [ ]Tachypnea  [ ]Audible excessive secretions   [ ]Rhonchi        [ ]Right [ ]Left [ ]Bilateral  [ ]Crackles        [ ]Right [ ]Left [ ]Bilateral  [ ]Wheezing     [ ]Right [ ]Left [ ]Bilatera  [ ]Diminished breath sounds [ ]right [ ]left [ ]bilateral  CARDIOVASCULAR:    [x ]Regular [ ]Irregular [ ]Tachy  [ ]Hung [ ]Murmur [ ]Other  GASTROINTESTINAL:  [x ]Soft  [ ]Distended   [ ]+BS  [ ]Non tender [ ]Tender  [ ]PEG [ ]OGT/ NGT  Last BM: 1/28    GENITOURINARY:  [ ]Normal [ ] Incontinent   [ ]Oliguria/Anuria   [x ]Siddiqi  MUSCULOSKELETAL:   [ ]Normal   [x ]Weakness  [x ]Bed/Wheelchair bound [ ]Edema  NEUROLOGIC:   [ ]No focal deficits  [x ] severe Cognitive impairment  [x ]Dysphagia [x ]Dysarthria [ ]Paresis [ ]Other   SKIN:   [x ]Normal    [ ]Rash  [ ]Pressure ulcer(s)       Present on admission [ ]y [ ]n    CRITICAL CARE:  [ ] Shock Present  [ ]Septic [ ]Cardiogenic [ ]Neurologic [ ]Hypovolemic  [ ]  Vasopressors [ ]  Inotropes   [ ]Respiratory failure present [ ]Mechanical ventilation [ ]Non-invasive ventilatory support [ ]High flow  [ ]Acute  [ ]Chronic [ ]Hypoxic  [ ]Hypercarbic [ ]Other  [ ]Other organ failure     LABS:                                                 12.0   7.96  )-----------( 141      ( 30 Jan 2020 06:25 )             38.5   01-30    157<H>  |  118<H>  |  48<H>  ----------------------------<  81  3.5   |  23  |  1.09    Ca    9.6      30 Jan 2020 06:25  Phos  3.0     01-30  Mg     2.1     01-30    TPro  7.8  /  Alb  3.8  /  TBili  0.7  /  DBili  x   /  AST  21  /  ALT  13  /  AlkPhos  84  01-30            RADIOLOGY & ADDITIONAL STUDIES:  < from: CT Head No Cont (01.28.20 @ 12:13) >  EXAM:  CT BRAIN                            PROCEDURE DATE:  01/28/2020  Impression:    Unchanged bilateral MCA territory infarcts with stable hemorrhage within the left frontal lobe.    < end of copied text >    PROTEIN CALORIE MALNUTRITION PRESENT: [ ]mild [ ]moderate [ ]severe [ ]underweight [ ]morbid obesity  https://www.andeal.org/vault/2440/web/files/ONC/Table_Clinical%20Characteristics%20to%20Document%20Malnutrition-White%20JV%20et%20al%202012.pdf    Height (cm): 152.4 (01-23-20 @ 13:10)  Weight (kg): 74 (01-23-20 @ 13:10)  BMI (kg/m2): 31.9 (01-23-20 @ 13:10)    [ ]PPSV2 < or = to 30% [ ]significant weight loss  [ ]poor nutritional intake  [ ]anasarca     Albumin, Serum: 3.8 g/dL (01-23-20 @ 10:25)   [ ]Artificial Nutrition      REFERRALS:   [ ]Chaplaincy  [ ]Hospice  [ ]Child Life  [ ]Social Work  [ ]Case management [ ]Holistic Therapy     Goals of Care Document:

## 2020-01-30 NOTE — PROGRESS NOTE ADULT - PROBLEM SELECTOR PLAN 5
Will continue to f/u for GOC.   Allen Robbins MD.    Geriatrics and Palliative Care Consult Service.   From 9am to 5 pm Monday to Friday, please call [phone]  After hours or during the weekend, please call [phone].

## 2020-01-30 NOTE — PROGRESS NOTE ADULT - SUBJECTIVE AND OBJECTIVE BOX
[facility] NUTRITION SUPPORT / TPN -- FOLLOW UP NOTE  --------------------------------------------------------------------------------    24 hour events/subjective:    pt unable to offer complaints  restless- on 1:1  no vomitting / diarrhea  (+)BM, unknown flatus  no f/c/s  no pain, cough, sob, dyspnea noted  pt w/ radha      Diet:  Diet, NPO (01-28-20 @ 11:58)      Appetite: pt unable to offer  Caloric intake:  [   ]  Adequate   [ x  ] Inadequate    ROS: General/ GI see HPI  all other systems negative    ALLERGIES & MEDICATIONS  --------------------------------------------------------------------------------  Allergy Status Unknown      STANDING INPATIENT MEDICATIONS    ARIPiprazole 2 milliGRAM(s) Oral daily  aspirin Suppository 300 milliGRAM(s) Rectal daily  atorvastatin 80 milliGRAM(s) Oral at bedtime  enoxaparin Injectable 30 milliGRAM(s) SubCutaneous daily  ergocalciferol 19953 Unit(s) Oral every week  levothyroxine Injectable 62.5 MICROGram(s) IV Push at bedtime  niCARdipine Infusion 5 mG/Hr IV Continuous <Continuous>  OLANZapine Injectable 5 milliGRAM(s) IntraMuscular once  sodium chloride 0.9%. 1000 milliLiter(s) IV Continuous <Continuous>  valproate sodium IVPB 500 milliGRAM(s) IV Intermittent two times a day      PRN INPATIENT MEDICATION  acetaminophen  Suppository .. 650 milliGRAM(s) Rectal every 6 hours PRN  labetalol Injectable 10 milliGRAM(s) IV Push three times a day PRN  levalbuterol Inhalation 0.63 milliGRAM(s) Inhalation every 6 hours PRN        VITALS/PHYSICAL EXAM  --------------------------------------------------------------------------------  T(C): 36.4 (01-30-20 @ 08:10), Max: 37.2 (01-29-20 @ 15:23)  HR: 89 (01-30-20 @ 08:15) (72 - 117)  BP: 184/67 (01-30-20 @ 08:15) (146/62 - 191/85)  RR: 14 (01-30-20 @ 08:15) (11 - 20)  SpO2: 100% (01-30-20 @ 08:15) (98% - 100%)  Wt(kg): --        01-29-20 @ 07:01  -  01-30-20 @ 07:00  --------------------------------------------------------  IN: 0 mL / OUT: 300 mL / NET: -300 mL    PHYSICAL EXAM  --------------------------------------------------------------------------------  	Gen: NAD, Arousable, moans  	HEENT: NC/AT,  PERRL, supple neck, trach midline, clear oropharynx, mucosa moist  	GI: +BS, soft, nontender/nondistended              MSK: FROM,  no deformity nor contractures              Vascular:  Equally Warm, no clubbing, cyanosis, nor edema  	Neuro: Aphasic, moans, doesn't follow commands.  	Skin: Warm, with good turgor, and without rashes  	    LABS/ CULTURES/ RADIOLOGY:              12.0   7.96  >-----------<  141      [01-30-20 @ 06:25]              38.5     157  |  118  |  48  ----------------------------<  81      [01-30-20 @ 06:25]  3.5   |  23  |  1.09        Ca     9.6     [01-30-20 @ 06:25]      iCa    1.28     [01-30 @ 06:30]      Mg     2.1     [01-30-20 @ 06:25]      Phos  3.0     [01-30-20 @ 06:25]    TPro  7.8  /  Alb  3.8  /  TBili  0.7  /  DBili  x   /  AST  21  /  ALT  13  /  AlkPhos  84  [01-30-20 @ 06:25]    Prealbumin, Serum: 14 mg/dL (01-30-20 @ 09:34)    Triglycerides, Serum: 168 mg/dL (01.30.20 @ 06:25)  Triglycerides, Serum: 91 mg/dL (01.23.20 @ 21:22)    Urea Nitrogen,  Random Urine: 678 mg/dL (01.29.20 @ 23:21)    Osmolality, Random Urine: 524 mosm/Kg (01.30.20 @ 00:51)

## 2020-01-30 NOTE — PROGRESS NOTE ADULT - PROBLEM SELECTOR PLAN 2
Sec to CVA  On Zyprexa and Depacon for agittion. Need to monitor QT for proloned QTc and on tele for any WCT

## 2020-01-30 NOTE — CHART NOTE - NSCHARTNOTEFT_GEN_A_CORE
MEDICINE PA    Pt signed out to me on a 1:1 constant observation. Pt continues to require 1:1 in the setting of continued agitation and poor mental status.   Will continue to assess and follow for need of 1:1.  Day team to follow in AM.    -Chaz Hooker PA-C, [id number]   Dept of Medicine

## 2020-01-30 NOTE — PROGRESS NOTE ADULT - PROBLEM SELECTOR PLAN 1
Clincially slightly improved while moving all extremities. However, she is non verbal, unable to swallow properly. Swllsidraing mayte TUTTLE noted.

## 2020-01-30 NOTE — PROGRESS NOTE ADULT - PROBLEM SELECTOR PLAN 5
S/p Nicardipne IV infusion. Needs IV meds since she is NPO. Patches of CLinidine and NTG ordered, along with IV Labetalol 20 mg Q6, IV hydfralaizine

## 2020-01-31 NOTE — CHART NOTE - NSCHARTNOTEFT_GEN_A_CORE
Pt was to have Kaofeed tube placed today after discussion with Dr Arriaga. Went to Unit to insert Kaofeed Tube.   Pt's son stated that he did not wish pt to have a feeding tube placed since he did not wish for her to be restrained,  or to have mittens. He called and spoke with Med Attending Dr Arriaga. After discussion, Dr Arriaga instructed me to place pt on Pleasure Feeds.   Pt continues on D5W at 70 cc/hr for Hypernatremia. Nephrology following.   Endorsed to Night Team for further care.

## 2020-01-31 NOTE — PROGRESS NOTE ADULT - ASSESSMENT
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    developed flash pulmonary edema/ hypertensive emergency, had  hypoxemia last night 2/2 flash pulmonary edema. has had cta neck as well.   ROMERO/chf --> resolved       1-hypernatremia  2- hypokalemia    in setting of decrease po intake   D5w@ 50 cc/hr   replete k if k lower

## 2020-01-31 NOTE — PROGRESS NOTE ADULT - SUBJECTIVE AND OBJECTIVE BOX
[facility] NUTRITION SUPPORT / TPN -- FOLLOW UP NOTE  --------------------------------------------------------------------------------    24 hour events/subjective:  pt unable to offer complaints  less restless- OOB to chair      still on 1:1  no vomiting / diarrhea  (+)BM, unknown flatus  no f/c/s  no pain, cough, sob, dyspnea noted  pt w/ rahda      Diet:  Diet, NPO (01-28-20 @ 11:58)      Appetite: pt unable to offer  Caloric intake:  [   ]  Adequate   [ x  ] Inadequate    ROS: General/ GI see HPI  all other systems negative      ALLERGIES & MEDICATIONS  --------------------------------------------------------------------------------  Allergy Status Unknown    STANDING INPATIENT MEDICATIONS    ARIPiprazole 2 milliGRAM(s) Oral daily  aspirin Suppository 300 milliGRAM(s) Rectal daily  atorvastatin 80 milliGRAM(s) Oral at bedtime  cloNIDine Patch 0.1 mG/24Hr(s) 1 patch Transdermal every 7 days  dextrose 5%. 1000 milliLiter(s) IV Continuous <Continuous>  enoxaparin Injectable 30 milliGRAM(s) SubCutaneous daily  ergocalciferol 40658 Unit(s) Oral every week  levothyroxine Injectable 62.5 MICROGram(s) IV Push at bedtime  nitroglycerin    2% Ointment 0.5 Inch(s) Transdermal daily  OLANZapine Injectable 5 milliGRAM(s) IntraMuscular once  valproate sodium IVPB 500 milliGRAM(s) IV Intermittent two times a day      PRN INPATIENT MEDICATION  acetaminophen  Suppository .. 650 milliGRAM(s) Rectal every 6 hours PRN  labetalol Injectable 20 milliGRAM(s) IV Push every 6 hours PRN  levalbuterol Inhalation 0.63 milliGRAM(s) Inhalation every 6 hours PRN        VITALS/PHYSICAL EXAM  --------------------------------------------------------------------------------  T(C): 36.8 (01-31-20 @ 07:40), Max: 36.9 (01-30-20 @ 19:56)  HR: 94 (01-31-20 @ 07:40) (65 - 94)  BP: 155/95 (01-31-20 @ 07:40) (132/66 - 186/70)  RR: 15 (01-31-20 @ 07:40) (13 - 19)  SpO2: 98% (01-31-20 @ 07:40) (96% - 100%)  Wt(kg): --        01-30-20 @ 07:01  -  01-31-20 @ 07:00  --------------------------------------------------------  IN: 1290 mL / OUT: 1400 mL / NET: -110 mL    PHYSICAL EXAM  --------------------------------------------------------------------------------  	Gen: NAD, Arousable,  moans  	HEENT: NC/AT,  PERRL, supple neck, trach midline, clear oropharynx, mucosa moist  	GI: +BS, soft, nontender/nondistended, well healed incisions              MSK: FROM,  no deformity nor contractures              Vascular:  Equally Warm, no clubbing, cyanosis, nor edema  	Neuro: Aphasic, moans, doesn't follow commands.  	Skin: Warm, with good turgor, and without rashes        LABS/ CULTURES/ RADIOLOGY:              12.0   7.96  >-----------<  141      [01-30-20 @ 06:25]              38.5     152  |  113  |  38  ----------------------------<  95      [01-31-20 @ 06:40]  3.4   |  23  |  0.98        Ca     9.2     [01-31-20 @ 06:40]      iCa    1.28     [01-30 @ 06:30]      Mg     2.0     [01-31-20 @ 06:40]      Phos  3.0     [01-31-20 @ 06:40]    TPro  7.8  /  Alb  3.8  /  TBili  0.7  /  DBili  x   /  AST  21  /  ALT  13  /  AlkPhos  84  [01-30-20 @ 06:25]      Prealbumin, Serum: 14 mg/dL (01-30-20 @ 09:34)    Triglycerides, Serum: 153 mg/dL (01.31.20 @ 06:40)  Triglycerides, Serum: 168 mg/dL (01.30.20 @ 06:25)  Triglycerides, Serum: 91 mg/dL (01.23.20 @ 21:22)    Urea Nitrogen,  Random Urine: 495mg/dL (01.30.20 @ 15:09)  Chloride, Random Urine: 164mmol/L (01.30.20 @ 12:04)  Creatinine, Random Urine: 22mg/dL (01.30.20 @ 12:04)  Potassium, Random Urine: 16mmol/L (01.30.20 @ 12:04)  Osmolality, Random Urine: 537 mos/kg (01.30.20 @ 12:04)  Sodium, Random Urine: 168 mmol/L (01.30.20 @ 12:04)

## 2020-01-31 NOTE — PROGRESS NOTE ADULT - PROBLEM SELECTOR PLAN 2
Sec to CVA  On Zyprexa and Depacon for agittion. Need to monitor QT for prolonged QTc and on tele for any WCT  May get Psych eval for medication management

## 2020-01-31 NOTE — PROGRESS NOTE ADULT - PROBLEM SELECTOR PLAN 1
Clincially slightly improved while moving all extremities. However, she is non verbal, unable to swallow properly. Missy TUTTLE noted.  Based on extensive discussion with her family, will initiate pleasure feeds. The family understands fully well the risk of aspiration. They are not interested in NT feedings as it would requires physical restraints, and they do not want their mother to be chained up in a cage(SIC)"

## 2020-01-31 NOTE — PROGRESS NOTE ADULT - SUBJECTIVE AND OBJECTIVE BOX
[facility]   [phone]  RENAL FOLLOW UP NOTE  --------------------------------------------------------------------------------  Chief Complaint:    24 hour events/subjective:    seen earlier son at bedside.   confused     PAST HISTORY  --------------------------------------------------------------------------------  No significant changes to PMH, PSH, FHx, SHx, unless otherwise noted    ALLERGIES & MEDICATIONS  --------------------------------------------------------------------------------  Allergies    Allergy Status Unknown    Intolerances      Standing Inpatient Medications  ARIPiprazole 2 milliGRAM(s) Oral daily  aspirin Suppository 300 milliGRAM(s) Rectal daily  atorvastatin 80 milliGRAM(s) Oral at bedtime  cloNIDine Patch 0.1 mG/24Hr(s) 1 patch Transdermal every 7 days  dextrose 5%. 1000 milliLiter(s) IV Continuous <Continuous>  enoxaparin Injectable 30 milliGRAM(s) SubCutaneous daily  ergocalciferol 18314 Unit(s) Oral every week  levothyroxine Injectable 62.5 MICROGram(s) IV Push at bedtime  nitroglycerin    2% Ointment 0.5 Inch(s) Transdermal daily  OLANZapine Injectable 5 milliGRAM(s) IntraMuscular once  valproate sodium IVPB 500 milliGRAM(s) IV Intermittent two times a day    PRN Inpatient Medications  acetaminophen  Suppository .. 650 milliGRAM(s) Rectal every 6 hours PRN  labetalol Injectable 20 milliGRAM(s) IV Push every 6 hours PRN  levalbuterol Inhalation 0.63 milliGRAM(s) Inhalation every 6 hours PRN      REVIEW OF SYSTEMS  --------------------------------------------------------------------------------      VITALS/PHYSICAL EXAM  --------------------------------------------------------------------------------  T(C): 36.7 (01-31-20 @ 19:45), Max: 36.8 (01-31-20 @ 07:40)  HR: 84 (01-31-20 @ 19:45) (75 - 94)  BP: 163/79 (01-31-20 @ 19:45) (151/75 - 180/82)  RR: 19 (01-31-20 @ 19:45) (15 - 19)  SpO2: 100% (01-31-20 @ 19:45) (97% - 100%)  Wt(kg): --        01-30-20 @ 07:01 - 01-31-20 @ 07:00  --------------------------------------------------------  IN: 1290 mL / OUT: 1400 mL / NET: -110 mL    01-31-20 @ 07:01  -  01-31-20 @ 21:20  --------------------------------------------------------  IN: 310 mL / OUT: 500 mL / NET: -190 mL      Physical Exam:  	  Gen: Non toxic but restless   	no jvd \  	Pulm: decrease bs  no rales or ronchi or wheezing  	CV: RRR, S1S2; no rub  	Abd: +BS, soft, nontender/nondistended  	: No  distended bladder palp   	UE: Warm, no cyanosis  no clubbing,  no edema  	LE: Warm, no cyanosis  no clubbing, no edema  	Neuro:  non communicative         LABS/STUDIES  --------------------------------------------------------------------------------              12.0   7.96  >-----------<  141      [01-30-20 @ 06:25]              38.5     152  |  113  |  38  ----------------------------<  95      [01-31-20 @ 06:40]  3.4   |  23  |  0.98        Ca     9.2     [01-31-20 @ 06:40]      iCa    1.28     [01-30 @ 06:30]      Mg     2.0     [01-31-20 @ 06:40]      Phos  3.0     [01-31-20 @ 06:40]    TPro  7.8  /  Alb  3.8  /  TBili  0.7  /  DBili  x   /  AST  21  /  ALT  13  /  AlkPhos  84  [01-30-20 @ 06:25]          Creatinine Trend:  SCr 0.98 [01-31 @ 06:40]  SCr 1.09 [01-30 @ 06:25]  SCr 1.27 [01-29 @ 04:29]  SCr 1.33 [01-28 @ 16:40]  SCr 1.19 [01-28 @ 05:22]              Urinalysis - [01-23-20 @ 16:48]      Color Light Yellow / Appearance Clear / SG 1.030 / pH 6.0      Gluc Negative / Ketone Negative  / Bili Negative / Urobili Negative       Blood Small / Protein Trace / Leuk Est Negative / Nitrite Negative      RBC 15 / WBC 0 / Hyaline 0 / Gran  / Sq Epi  / Non Sq Epi 0 / Bacteria Negative    Urine Creatinine 22      [01-30-20 @ 12:04]  Urine Sodium 168      [01-30-20 @ 12:04]  Urine Urea Nitrogen 495      [01-30-20 @ 15:09]  Urine Potassium 16      [01-30-20 @ 12:04]  Urine Chloride 164      [01-30-20 @ 12:04]  Urine Osmolality 537      [01-30-20 @ 12:04]    HbA1c 5.2      [01-30-20 @ 08:26]  TSH 1.80      [01-23-20 @ 21:21]  Lipid: chol --, , HDL --, LDL --      [01-31-20 @ 06:40]

## 2020-01-31 NOTE — PROGRESS NOTE ADULT - SUBJECTIVE AND OBJECTIVE BOX
Patient is a 89y old  Female who presents with a chief complaint of stroke (2020 12:19)      HPI:  Awake, non verbal, slurred speech. Moves all extremities  BP svariable  Agitated at times  PAST MEDICAL & SURGICAL HISTORY:      Review of Systems:   CONSTITUTIONAL: No fever, weight loss, or fatigue  EYES: No eye pain, visual disturbances, or discharge  ENMT:  No difficulty hearing, tinnitus, vertigo; No sinus or throat pain  NECK: No pain or stiffness  BREASTS: No pain, masses, or nipple discharge  RESPIRATORY: No cough, wheezing, chills or hemoptysis; No shortness of breath  CARDIOVASCULAR: No chest pain, palpitations, dizziness, or leg swelling  GASTROINTESTINAL: No abdominal or epigastric pain. No nausea, vomiting, or hematemesis; No diarrhea or constipation. No melena or hematochezia.  GENITOURINARY: No dysuria, frequency, hematuria, or incontinence  NEUROLOGICAL: HPI  SKIN: No itching, burning, rashes, or lesions   LYMPH NODES: No enlarged glands  ENDOCRINE: No heat or cold intolerance; No hair loss  MUSCULOSKELETAL: No joint pain or swelling; No muscle, back, or extremity pain  PSYCHIATRIC: No depression, anxiety, mood swings, or difficulty sleeping  HEME/LYMPH: No easy bruising, or bleeding gums  ALLERY AND IMMUNOLOGIC: No hives or eczema    Allergies    Allergy Status Unknown    Intolerances        Social History:     FAMILY HISTORY:      MEDICATIONS  (STANDING):  ARIPiprazole 2 milliGRAM(s) Oral daily  aspirin Suppository 300 milliGRAM(s) Rectal daily  atorvastatin 80 milliGRAM(s) Oral at bedtime  enoxaparin Injectable 30 milliGRAM(s) SubCutaneous daily  ergocalciferol 30465 Unit(s) Oral every week  levothyroxine Injectable 62.5 MICROGram(s) IV Push at bedtime  sodium chloride 0.9%. 1000 milliLiter(s) (50 mL/Hr) IV Continuous <Continuous>  valproate sodium IVPB 500 milliGRAM(s) IV Intermittent two times a day    MEDICATIONS  (PRN):  labetalol Injectable 10 milliGRAM(s) IV Push three times a day PRN SBP > 180  levalbuterol Inhalation 0.63 milliGRAM(s) Inhalation every 6 hours PRN Shortness of breath/Wheezing        CAPILLARY BLOOD GLUCOSE        I&O's Summary    2020 07:01  -  2020 07:00  --------------------------------------------------------  IN: 335 mL / OUT: 2890 mL / NET: -2555 mL        PHYSICAL EXAM:  Vital Signs Last 24 Hrs  T(C): 36.6 (2020 20:00), Max: 38.3 (2020 07:46)  T(F): 97.8 (2020 20:00), Max: 101 (2020 07:46)  HR: 67 (2020 20:00) (62 - 128)  BP: 173/51 (2020 20:00) (137/125 - 195/62)  BP(mean): 87 (2020 20:00) (75 - 184)  RR: 16 (2020 20:00) (13 - 26)  SpO2: 97% (2020 20:00) (91% - 100%)    GENERAL: NAD, well-developed  HEAD:  Atraumatic, Normocephalic  EYES: EOMI, PERRLA, conjunctiva and sclera clear  NECK: Supple, No JVD  CHEST/LUNG: Clear to auscultation bilaterally; No wheeze  HEART: Regular rate and rhythm; No murmurs, rubs, or gallops  ABDOMEN: Soft, Nontender, Nondistended; Bowel sounds present  EXTREMITIES:  2+ Peripheral Pulses, No clubbing, cyanosis, or edema  PSYCH: AAOx3  NEUROLOGY: non-focal  SKIN: No rashes or lesions    LABS:                        10.4   6.40  )-----------( 114      ( 2020 04:52 )             32.9     01-24    139  |  99  |  47<H>  ----------------------------<  153<H>  4.6   |  21<L>  |  1.57<H>    Ca    9.2      2020 04:52    TPro  7.2  /  Alb  3.8  /  TBili  0.6  /  DBili  x   /  AST  16  /  ALT  7<L>  /  AlkPhos  97  01-23    PT/INR - ( 2020 10:25 )   PT: 12.0 sec;   INR: 1.05 ratio         PTT - ( 2020 10:25 )  PTT:30.9 sec      Urinalysis Basic - ( 2020 16:48 )    Color: Light Yellow / Appearance: Clear / S.030 / pH: x  Gluc: x / Ketone: Negative  / Bili: Negative / Urobili: Negative   Blood: x / Protein: Trace / Nitrite: Negative   Leuk Esterase: Negative / RBC: 15 /hpf / WBC 0 /HPF   Sq Epi: x / Non Sq Epi: 0 /hpf / Bacteria: Negative        RADIOLOGY & ADDITIONAL TESTS:    Imaging Personally Reviewed:    Consultant(s) Notes Reviewed:      Care Discussed with Consultants/Other Providers:

## 2020-02-01 NOTE — CONSULT NOTE ADULT - SUBJECTIVE AND OBJECTIVE BOX
CC: Dysphagia/NGT placement under Laryngoscope with Nasal Bridal.    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 LKN after interviewing several family members in person and over the phone is 5:00 PM on 1/22/2020. At baseline she ambulates with a walker and sometimes needs 1 person assist. At 5:00 PM on 1/22/2020, she was seen ambulating at her baseline and speaking, and then acutely became weaker (was not even able to stand up from couch) and became confused, she was asking odd questions and was disoriented. The morning of presentation, she was also noted to have a R facial droop, and family called EMS after she was seen ambulating to the bathroom (unclear if she was weak then), and then she was found down in the bathroom and was unable to get up. Fall itself was not witnessed. She was also found in the ED to be in Afib. Per family patient is DNR/DNI. Given LKN > 4 hrs prior, was excluded from tPa. She has no LVO, is not an endovascular candidate. Pt is non verbal, and not able to f/u any commands. 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 Palliative care was called for GOC. Speech and swallow evaluated pt on 1/29/20 and recommednded "    PAST MEDICAL & SURGICAL HISTORY:   Allergies.  Allergy Status Unknown    Intolerances.  MEDICATIONS  (STANDING):  ARIPiprazole 2 milliGRAM(s) Oral daily  aspirin Suppository 300 milliGRAM(s) Rectal daily  atorvastatin 80 milliGRAM(s) Oral at bedtime  cloNIDine Patch 0.1 mG/24Hr(s) 1 patch Transdermal every 7 days  dextrose 5%. 1000 milliLiter(s) (70 mL/Hr) IV Continuous <Continuous>  enoxaparin Injectable 30 milliGRAM(s) SubCutaneous daily  ergocalciferol 84015 Unit(s) Oral every week  levothyroxine Injectable 62.5 MICROGram(s) IV Push at bedtime  nitroglycerin    2% Ointment 0.5 Inch(s) Transdermal daily  OLANZapine Injectable 5 milliGRAM(s) IntraMuscular once  potassium chloride  10 mEq/100 mL IVPB 10 milliEquivalent(s) IV Intermittent every 1 hour  valproate sodium IVPB 500 milliGRAM(s) IV Intermittent two times a day    MEDICATIONS  (PRN):  acetaminophen  Suppository .. 650 milliGRAM(s) Rectal every 6 hours PRN Mild Pain (1 - 3)  labetalol Injectable 20 milliGRAM(s) IV Push every 6 hours PRN SBP > 180  levalbuterol Inhalation 0.63 milliGRAM(s) Inhalation every 6 hours PRN Shortness of breath/Wheezing.    Social History:   Family history:   ROS:   ENT: all negative except as noted in HPI   CV: denies palpitations  Pulm: denies SOB, cough, hemoptysis  GI: denies change in apetite, indigestion, n/v  : denies pertinent urinary symptoms, urgency  Neuro: denies numbness/tingling, loss of sensation  Psych: denies anxiety  MS: denies muscle weakness, instability  Heme: denies easy bruising or bleeding  Endo: denies heat/cold intolerance, excessive sweating  Vascular: denies LE edema    Vital Signs Last 24 Hrs  T(C): 36.5 (01 Feb 2020 19:35), Max: 37.1 (01 Feb 2020 15:49)  T(F): 97.7 (01 Feb 2020 19:35), Max: 98.7 (01 Feb 2020 15:49)  HR: 64 (01 Feb 2020 19:35) (64 - 80)  BP: 178/82 (01 Feb 2020 19:35) (141/71 - 189/92)  BP(mean): --  RR: 18 (01 Feb 2020 19:35) (18 - 18)  SpO2: 98% (01 Feb 2020 19:35) (96% - 100%)     02-01    146<H>  |  109<H>  |  27<H>  ----------------------------<  104<H>  3.2<L>   |  25  |  1.01    Ca    9.0      01 Feb 2020 11:41  Phos  3.0     01-31  Mg     1.6     02-01         PHYSICAL EXAM:  Gen: NAD  Skin: No rashes, bruises, or lesions  Head: Normocephalic, Atraumatic  Face: no edema, erythema, or fluctuance. Parotid glands soft without mass  Eyes: no scleral injection  Ears: Right - ear canal clear, TM intact without effusion or erythema. No evidence of any fluid drainage. No mastoid tenderness, erythema, or ear bulging            Left - ear canal clear, TM intact without effusion or erythema. No evidence of any fluid drainage. No mastoid tenderness, erythema, or ear bulging  Nose: Nares bilaterally patent, no discharge  Mouth: No Stridor / Drooling / Trismus.  Mucosa moist, tongue/uvula midline, oropharynx clear  Neck: Flat, supple, no lymphadenopathy, trachea midline, no masses  Lymphatic: No lymphadenopathy  Resp: breathing easily, no stridor  CV: no peripheral edema/cyanosis  GI: nondistended   Peripheral vascular: no JVD or edema  Neuro: facial nerve intact, no facial droop    Fiberoptic Indirect laryngoscopy:  (Scope #2 used)  Reason for Laryngoscopy: Dysphagia /NGT placement .   Patient was unable to cooperate with mirror.  Nasopharynx, oropharynx, and hypopharynx clear, no bleeding. Tongue base, posterior pharyngeal wall, vallecula, epiglottis, and subglottis appear normal. No erythema, edema, pooling of secretions, masses or lesions. Airway patent, no foreign body visualized. No glottic/supraglottic edema. True vocal cords, arytenoids, vestibular folds, ventricles, pyriform sinuses, and aryepiglottic folds appear normal bilaterally. Vocal cords mobile with good contact b/l.              IMAGING/ADDITIONAL STUDIES: CC: Dysphagia/NGT placement under Laryngoscope with Nasal Bridal.    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 LKN after interviewing several family members in person and over the phone is 5:00 PM on 1/22/2020. At baseline she ambulates with a walker and sometimes needs 1 person assist. At 5:00 PM on 1/22/2020, she was seen ambulating at her baseline and speaking, and then acutely became weaker (was not even able to stand up from couch) and became confused, she was asking odd questions and was disoriented. The morning of presentation, she was also noted to have a R facial droop, and family called EMS after she was seen ambulating to the bathroom (unclear if she was weak then), and then she was found down in the bathroom and was unable to get up. Fall itself was not witnessed. She was also found in the ED to be in Afib. Per family patient is DNR/DNI. Given LKN > 4 hrs prior, was excluded from tPa. She has no LVO, is not an endovascular candidate. Pt is non verbal, and not able to f/u any commands. 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 Palliative care was called for GOC. Speech and swallow evaluated pt on 1/29/20 and recommednded "    PAST MEDICAL & SURGICAL HISTORY:   Allergies.  Allergy Status Unknown    Intolerances.  MEDICATIONS  (STANDING):  ARIPiprazole 2 milliGRAM(s) Oral daily  aspirin Suppository 300 milliGRAM(s) Rectal daily  atorvastatin 80 milliGRAM(s) Oral at bedtime  cloNIDine Patch 0.1 mG/24Hr(s) 1 patch Transdermal every 7 days  dextrose 5%. 1000 milliLiter(s) (70 mL/Hr) IV Continuous <Continuous>  enoxaparin Injectable 30 milliGRAM(s) SubCutaneous daily  ergocalciferol 26171 Unit(s) Oral every week  levothyroxine Injectable 62.5 MICROGram(s) IV Push at bedtime  nitroglycerin    2% Ointment 0.5 Inch(s) Transdermal daily  OLANZapine Injectable 5 milliGRAM(s) IntraMuscular once  potassium chloride  10 mEq/100 mL IVPB 10 milliEquivalent(s) IV Intermittent every 1 hour  valproate sodium IVPB 500 milliGRAM(s) IV Intermittent two times a day    MEDICATIONS  (PRN):  acetaminophen  Suppository .. 650 milliGRAM(s) Rectal every 6 hours PRN Mild Pain (1 - 3)  labetalol Injectable 20 milliGRAM(s) IV Push every 6 hours PRN SBP > 180  levalbuterol Inhalation 0.63 milliGRAM(s) Inhalation every 6 hours PRN Shortness of breath/Wheezing.    Social History:   Family history:   ROS:   ENT: all negative except as noted in HPI   CV: denies palpitations  Pulm: denies SOB, cough, hemoptysis  GI: denies change in apetite, indigestion, n/v  : denies pertinent urinary symptoms, urgency  Neuro: denies numbness/tingling, loss of sensation  Psych: denies anxiety  MS: denies muscle weakness, instability  Heme: denies easy bruising or bleeding  Endo: denies heat/cold intolerance, excessive sweating  Vascular: denies LE edema    Vital Signs Last 24 Hrs  T(C): 36.5 (01 Feb 2020 19:35), Max: 37.1 (01 Feb 2020 15:49)  T(F): 97.7 (01 Feb 2020 19:35), Max: 98.7 (01 Feb 2020 15:49)  HR: 64 (01 Feb 2020 19:35) (64 - 80)  BP: 178/82 (01 Feb 2020 19:35) (141/71 - 189/92)  BP(mean): --  RR: 18 (01 Feb 2020 19:35) (18 - 18)  SpO2: 98% (01 Feb 2020 19:35) (96% - 100%)     02-01    146<H>  |  109<H>  |  27<H>  ----------------------------<  104<H>  3.2<L>   |  25  |  1.01    Ca    9.0      01 Feb 2020 11:41  Phos  3.0     01-31  Mg     1.6     02-01         PHYSICAL EXAM:  Gen: NAD  Skin: No rashes, bruises, or lesions  Head: Normocephalic, Atraumatic  Face: no edema, erythema, or fluctuance. Parotid glands soft without mass  Eyes: no scleral injection  Nose: Nares bilaterally patent, no discharge  Mouth: No Stridor / Drooling / Trismus.  Mucosa moist, tongue/uvula midline, oropharynx clear  Neck: Flat, supple, no lymphadenopathy, trachea midline, no masses  Lymphatic: No lymphadenopathy  Resp: breathing easily, no stridor  CV: no peripheral edema/cyanosis  GI: nondistended   Peripheral vascular: no JVD or edema  Neuro: facial nerve intact, no facial droop    Fiberoptic Indirect laryngoscopy:  (Scope #2 used)  Reason for Laryngoscopy: Dysphagia /NGT placement .   Patient was unable to cooperate with mirror.  Nasopharynx, oropharynx, and hypopharynx clear, no bleeding. Tongue base, posterior pharyngeal wall, vallecula, epiglottis, and subglottis appear normal. No erythema, edema, pooling of secretions, masses or lesions. Airway patent, no foreign body visualized. No glottic/supraglottic edema. True vocal cords, arytenoids, vestibular folds, ventricles, pyriform sinuses, and aryepiglottic folds appear normal bilaterally. Vocal cords mobile with good contact b/l. CC: Dysphagia/NGT placement under Laryngoscope with Nasal Bridal.    HPI: 89 YO F with PMH of HTN, DM, Advanced  Dementia, Prior stroke in 2018 (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 AMS in the setting of left MCA infarct with hemorrhagic transformation over remote infarcts, right lateral ventricle ex vacuo enlargement, microvascular disease, lacunar infarcts, volume loss.  ENT was consulted for placement of NG Tube with Nasal Bride under the guidance of Fiberoptic Indirect Laryngoscopy. Family  not interested in NT feedings " as it would requires physical restraints, and they do not want their mother to be chained up in a cage (SIC)". Per speech and swallow, oropharyngeal dysphagia. Poor oral management, anterior loss of conservative textures, reduced oral transit and clearance as well as suspected premature loss to the pharynx. Delayed swallow trigger and wet, gurgly breath sounds are noted on tsp administration of puree, honey and ice chip, suggestive of laryngeal penetration/aspiration. S&S would like trial of NGT with re-evaluation of swallow as appropriate. Unable to obtain further ROS, since pt was non-verbal and unable to follow commands.     PAST MEDICAL & SURGICAL HISTORY:   Allergies.  Allergy Status Unknown    Intolerances.  MEDICATIONS  (STANDING):  ARIPiprazole 2 milliGRAM(s) Oral daily  aspirin Suppository 300 milliGRAM(s) Rectal daily  atorvastatin 80 milliGRAM(s) Oral at bedtime  cloNIDine Patch 0.1 mG/24Hr(s) 1 patch Transdermal every 7 days  dextrose 5%. 1000 milliLiter(s) (70 mL/Hr) IV Continuous <Continuous>  enoxaparin Injectable 30 milliGRAM(s) SubCutaneous daily  ergocalciferol 73721 Unit(s) Oral every week  levothyroxine Injectable 62.5 MICROGram(s) IV Push at bedtime  nitroglycerin    2% Ointment 0.5 Inch(s) Transdermal daily  OLANZapine Injectable 5 milliGRAM(s) IntraMuscular once  potassium chloride  10 mEq/100 mL IVPB 10 milliEquivalent(s) IV Intermittent every 1 hour  valproate sodium IVPB 500 milliGRAM(s) IV Intermittent two times a day    MEDICATIONS  (PRN):  acetaminophen  Suppository .. 650 milliGRAM(s) Rectal every 6 hours PRN Mild Pain (1 - 3)  labetalol Injectable 20 milliGRAM(s) IV Push every 6 hours PRN SBP > 180  levalbuterol Inhalation 0.63 milliGRAM(s) Inhalation every 6 hours PRN Shortness of breath/Wheezing.    Social History:   Family history:   ROS:   ENT: all negative except as noted in HPI   CV: denies palpitations  Pulm: denies SOB, cough, hemoptysis  GI: denies change in apetite, indigestion, n/v  : denies pertinent urinary symptoms, urgency  Neuro: denies numbness/tingling, loss of sensation  Psych: denies anxiety  MS: denies muscle weakness, instability  Heme: denies easy bruising or bleeding  Endo: denies heat/cold intolerance, excessive sweating  Vascular: denies LE edema    Vital Signs Last 24 Hrs  T(C): 36.5 (01 Feb 2020 19:35), Max: 37.1 (01 Feb 2020 15:49)  T(F): 97.7 (01 Feb 2020 19:35), Max: 98.7 (01 Feb 2020 15:49)  HR: 64 (01 Feb 2020 19:35) (64 - 80)  BP: 178/82 (01 Feb 2020 19:35) (141/71 - 189/92)  BP(mean): --  RR: 18 (01 Feb 2020 19:35) (18 - 18)  SpO2: 98% (01 Feb 2020 19:35) (96% - 100%)     02-01    146<H>  |  109<H>  |  27<H>  ----------------------------<  104<H>  3.2<L>   |  25  |  1.01    Ca    9.0      01 Feb 2020 11:41  Phos  3.0     01-31  Mg     1.6     02-01         PHYSICAL EXAM:  Gen: NAD,  Skin: No rashes, bruises, or lesions  Head: Normocephalic, Atraumatic  Face: no edema, erythema, or fluctuance. Parotid glands soft without mass  Eyes: no scleral injection  Nose: Nares bilaterally patent, no discharge  Mouth: No Stridor / Drooling / Trismus.  Mucosa moist, tongue/uvula midline, oropharynx clear  Neck: Flat, supple, no lymphadenopathy, trachea midline, no masses  Lymphatic: No lymphadenopathy  Resp: breathing easily, no stridor  CV: no peripheral edema/cyanosis  GI: nondistended   Peripheral vascular: no JVD or edema  Neuro: facial nerve intact, no facial droop    Fiberoptic Indirect laryngoscopy:  (Scope #2 used)  Reason for Laryngoscopy: Dysphagia /NGT placement .   Patient was unable to cooperate with mirror.  Nasopharynx, oropharynx, and hypopharynx clear, no bleeding. Tongue base, posterior pharyngeal wall, vallecula, epiglottis, and subglottis appear normal. No erythema, edema, pooling of secretions, masses or lesions. Airway patent, no foreign body visualized. No glottic/supraglottic edema. True vocal cords, arytenoids, vestibular folds, ventricles, pyriform sinuses, and aryepiglottic folds appear normal bilaterally. Vocal cords mobile with good contact b/l. CC: Dysphagia/NGT placement under Laryngoscope with Nasal Bridal.    HPI: 89 YO F with PMH of HTN, DM, Advanced  Dementia, Prior stroke in 2018 (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 AMS in the setting of left MCA infarct with hemorrhagic transformation over remote infarcts, right lateral ventricle ex vacuo enlargement, microvascular disease, lacunar infarcts, volume loss.  ENT was consulted for placement of NG Tube with Nasal Bride under the guidance of Fiberoptic Indirect Laryngoscopy. Family  not interested in NT feedings " as it would requires physical restraints, and they do not want their mother to be chained up in a cage (SIC)". Per speech and swallow, oropharyngeal dysphagia. Poor oral management, anterior loss of conservative textures, reduced oral transit and clearance as well as suspected premature loss to the pharynx. Delayed swallow trigger and wet, gurgly breath sounds are noted on tsp administration of puree, honey and ice chip, suggestive of laryngeal penetration/aspiration. S&S would like trial of NGT with re-evaluation of swallow as appropriate. Unable to obtain further ROS, since pt was non-verbal and unable to follow commands.     PAST MEDICAL & SURGICAL HISTORY:   Allergies.  Allergy Status Unknown    Intolerances.  MEDICATIONS  (STANDING):  ARIPiprazole 2 milliGRAM(s) Oral daily  aspirin Suppository 300 milliGRAM(s) Rectal daily  atorvastatin 80 milliGRAM(s) Oral at bedtime  cloNIDine Patch 0.1 mG/24Hr(s) 1 patch Transdermal every 7 days  dextrose 5%. 1000 milliLiter(s) (70 mL/Hr) IV Continuous <Continuous>  enoxaparin Injectable 30 milliGRAM(s) SubCutaneous daily  ergocalciferol 33744 Unit(s) Oral every week  levothyroxine Injectable 62.5 MICROGram(s) IV Push at bedtime  nitroglycerin    2% Ointment 0.5 Inch(s) Transdermal daily  OLANZapine Injectable 5 milliGRAM(s) IntraMuscular once  potassium chloride  10 mEq/100 mL IVPB 10 milliEquivalent(s) IV Intermittent every 1 hour  valproate sodium IVPB 500 milliGRAM(s) IV Intermittent two times a day    MEDICATIONS  (PRN):  acetaminophen  Suppository .. 650 milliGRAM(s) Rectal every 6 hours PRN Mild Pain (1 - 3)  labetalol Injectable 20 milliGRAM(s) IV Push every 6 hours PRN SBP > 180  levalbuterol Inhalation 0.63 milliGRAM(s) Inhalation every 6 hours PRN Shortness of breath/Wheezing.    Social History:   Family history:   ROS:   ENT: all negative except as noted in HPI     Vital Signs Last 24 Hrs  T(C): 36.5 (01 Feb 2020 19:35), Max: 37.1 (01 Feb 2020 15:49)  T(F): 97.7 (01 Feb 2020 19:35), Max: 98.7 (01 Feb 2020 15:49)  HR: 64 (01 Feb 2020 19:35) (64 - 80)  BP: 178/82 (01 Feb 2020 19:35) (141/71 - 189/92)  BP(mean): --  RR: 18 (01 Feb 2020 19:35) (18 - 18)  SpO2: 98% (01 Feb 2020 19:35) (96% - 100%)     02-01  146<H>  |  109<H>  |  27<H>  ----------------------------<  104<H>  3.2<L>   |  25  |  1.01    Ca    9.0      01 Feb 2020 11:41  Phos  3.0     01-31  Mg     1.6     02-01     PHYSICAL EXAM:  Gen: NAD.   Skin: No rashes, bruises, or lesions.   Head: Normocephalic, Atraumatic.  Face: no edema, erythema, or fluctuance. Parotid glands soft without mass.  Eyes: no scleral injection.  Nose: Nares bilaterally patent, no discharge.  Mouth: No Stridor / Drooling / Trismus.  Mucosa moist, tongue/uvula midline, oropharynx clear  Neck: Flat, supple, no lymphadenopathy, trachea midline, no masses  Lymphatic: No lymphadenopathy  Resp: breathing easily, no stridor  CV: no peripheral edema/cyanosis  GI: nondistended   Peripheral vascular: no JVD or edema  Neuro: facial nerve intact, no facial droop    Fiberoptic Indirect laryngoscopy:  (Scope #2 used)  Reason for Laryngoscopy: Dysphagia /NGT placement .   Patient was unable to cooperate with mirror.  Nasopharynx, oropharynx, and hypopharynx clear, no bleeding. Tongue base, posterior pharyngeal wall, vallecula, epiglottis, and subglottis appear normal. No erythema, edema, pooling of secretions, masses or lesions. Airway patent, no foreign body visualized. No glottic/supraglottic edema. True vocal cords, arytenoids, vestibular folds, ventricles, pyriform sinuses, and aryepiglottic folds appear normal bilaterally. Vocal cords mobile with good contact b/l. CC: Dysphagia/NGT placement under Laryngoscope with Nasal Bridal.    HPI: 89 YO F with PMH of HTN, DM, Advanced  Dementia, Prior stroke in 2018 (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 AMS in the setting of left MCA infarct with hemorrhagic transformation over remote infarcts, right lateral ventricle ex vacuo enlargement, microvascular disease, lacunar infarcts, volume loss.  ENT was consulted for placement of NG Tube with Nasal Bride under the guidance of Fiberoptic Indirect Laryngoscopy. Family  not interested in NT feedings " as it would requires physical restraints, and they do not want their mother to be chained up in a cage (SIC)". Per speech and swallow, oropharyngeal dysphagia. Poor oral management, anterior loss of conservative textures, reduced oral transit and clearance as well as suspected premature loss to the pharynx. Delayed swallow trigger and wet, gurgly breath sounds are noted on tsp administration of puree, honey and ice chip, suggestive of laryngeal penetration/aspiration. S&S would like trial of NGT with re-evaluation of swallow as appropriate. Unable to obtain further ROS, since pt was non-verbal and unable to follow commands.     PAST MEDICAL & SURGICAL HISTORY:   Allergies.  Allergy Status Unknown    Intolerances.  MEDICATIONS  (STANDING):  ARIPiprazole 2 milliGRAM(s) Oral daily  aspirin Suppository 300 milliGRAM(s) Rectal daily  atorvastatin 80 milliGRAM(s) Oral at bedtime  cloNIDine Patch 0.1 mG/24Hr(s) 1 patch Transdermal every 7 days  dextrose 5%. 1000 milliLiter(s) (70 mL/Hr) IV Continuous <Continuous>  enoxaparin Injectable 30 milliGRAM(s) SubCutaneous daily  ergocalciferol 65492 Unit(s) Oral every week  levothyroxine Injectable 62.5 MICROGram(s) IV Push at bedtime  nitroglycerin    2% Ointment 0.5 Inch(s) Transdermal daily  OLANZapine Injectable 5 milliGRAM(s) IntraMuscular once  potassium chloride  10 mEq/100 mL IVPB 10 milliEquivalent(s) IV Intermittent every 1 hour  valproate sodium IVPB 500 milliGRAM(s) IV Intermittent two times a day    MEDICATIONS  (PRN):  acetaminophen  Suppository .. 650 milliGRAM(s) Rectal every 6 hours PRN Mild Pain (1 - 3)  labetalol Injectable 20 milliGRAM(s) IV Push every 6 hours PRN SBP > 180  levalbuterol Inhalation 0.63 milliGRAM(s) Inhalation every 6 hours PRN Shortness of breath/Wheezing.    Social History:   Family history:   ROS:   ENT: all negative except as noted in HPI     Vital Signs Last 24 Hrs  T(C): 36.5 (01 Feb 2020 19:35), Max: 37.1 (01 Feb 2020 15:49)  T(F): 97.7 (01 Feb 2020 19:35), Max: 98.7 (01 Feb 2020 15:49)  HR: 64 (01 Feb 2020 19:35) (64 - 80)  BP: 178/82 (01 Feb 2020 19:35) (141/71 - 189/92)  BP(mean): --  RR: 18 (01 Feb 2020 19:35) (18 - 18)  SpO2: 98% (01 Feb 2020 19:35) (96% - 100%)     02-01  146<H>  |  109<H>  |  27<H>  ----------------------------<  104<H>  3.2<L>   |  25  |  1.01    Ca    9.0      01 Feb 2020 11:41  Phos  3.0     01-31  Mg     1.6     02-01     PHYSICAL EXAM:  Gen: NAD.   Skin: No rashes, bruises, or lesions.   Head: Normocephalic, Atraumatic.   Face: no edema, erythema, or fluctuance. Parotid glands soft without mass.  Eyes: no scleral injection.  Nose: Nares bilaterally patent, no discharge.   Mouth: No Stridor / Drooling / Trismus.  Mucosa moist, tongue/uvula midline, oropharynx clear  Neck: Flat, supple, no lymphadenopathy, trachea midline, no masses.  Lymphatic: No lymphadenopathy.  Resp: breathing easily, no stridor.  CV: no peripheral edema/cyanosis.  GI: nondistended .  Peripheral vascular: no JVD or edema.    Fiberoptic Indirect laryngoscopy:  (Scope #2 used)  Reason for Laryngoscopy: Dysphagia /NGT placement .   Patient was unable to cooperate with mirror.  Nasopharynx, oropharynx, and hypopharynx clear, no bleeding. Tongue base, posterior pharyngeal wall, vallecula, epiglottis, and subglottis appear normal. No erythema, edema, pooling of secretions, masses or lesions. Airway patent, no foreign body visualized. No glottic/supraglottic edema. True vocal cords, arytenoids, vestibular folds, ventricles, pyriform sinuses, and aryepiglottic folds appear normal bilaterally. Vocal cords mobile with good contact b/l.

## 2020-02-01 NOTE — CONSULT NOTE ADULT - PROVIDER SPECIALTY LIST ADULT
Nephrology
Cardiology
ENT
Pulmonology
Neurosurgery
Nutrition Support
Internal Medicine
Palliative Care

## 2020-02-01 NOTE — CONSULT NOTE ADULT - ASSESSMENT
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    developed flash pulmonary edema/ hypertensive emergency, had  hypoxemia last night 2/2 flash pulmonary edema. has had cta neck as well.  now with ROMERO/chf       1- ROMERO   2- pulm edema   3- HTN urgency/emergency o/n      romero in setting of iv contrast vs due to diuretics and hypertensive emergency  s/p lasix   trend cr   labetalol iv prn     iv lasix as needed   d/w family when seen as well as Dr. Arriaga and stroke team
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     TPN Consult requested for pt who has not eaten in 1wk  Pt is not a suitable candidate at this time.  Pt has a functional GI tract, this is the preferable means of nutrition.        Risks far outweigh the benefits  Discussed with Family and  Neuro/Medical teams placing KO feeding tube w/ Bridle to secure tube  Palliative and Speech & Swallow  to follow up  Ongoing GOC discussion  Pt is NPO/ IVF    In order to nutritionally optimize pt:  - HyperNa, Elevated BUN/Crt- Dehydration/ ROMERO?         Consider Urine 'Lytes   - Daily CMP, Mg, Ionized Ca, Phosphorus daily       Weekly Triglycerides & Prealbumin  - Pt with h/o Diabetes- FS every 6 hours w/ with ISS coverage- to be ordered by primary team  - Strict Intake and Output.  - Weights as per protocol  - Continue as per Neuro/Medicine will follow with you  D/w primary team  Thank you for this Consult    Dayna Taylor PA-C  TPN team, pager [phone]  D/w Jen Alberto
Dave Nguyen  89 F DNR/DNI presents for AMS with concerns for acute stroke.  CTA shows incidental 1 cm saccular L ICA aneurym.  Neurologically patient is somnolent and not responsive after being given haldol. Per report she had an AMS and was PISANO.    - No acute neurosurgical care   - Rest of management per neurology
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 Palliative care was called for GOC.
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     The morning of presentation, she was noted to have a R facial droop, had a fall in the bathroom, was unable to get up, and family called EMS.  New Afib in the ED, CT head with old infarcts, stroke code called but she was outside the window for TPA. Was given contrast for imaging and subsequently developed expiratory wheezing and inspiratory stridor. Was not able to tolerate laying flat for the MRI. Was noted to become hypoxemic and MRI aborted. Pulmonary consulted for further evaluation of wheezing and hypoxemia.     On POCUS had bilateral B lines with no pleural effusions. Was agitated and climbing out of the bed. Unable to articulate words due to severe aphasia. Stridor improved with steroids, benadryl, ENT was called for scope, but was aborted as patient was too agitated. Clinically suspect hypoxemia to be 2/2 flash pulmonary edema in the setting of Hypertensive crisis. Differential include reaction to contrast vs viral infection.      - Check RVP  - Check CXR  - Diurese with IV lasix  - Cautiously control BP  - formal ECHO  - try 2mg of IV haldol to start for agitation given tenuous respiratory status  - We called MICU for further evaluation.     Mery Oshea MD  Pulmonary & Critical Care Medicine Fellow | PGY-5  Pager: [phone]  Spectra: [phone] ([facility])
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 AMS in the setting of left MCA infarct with hemorrhagic transformation over remote infarcts, right lateral ventricle ex vacuo enlargement, microvascular disease, lacunar infarcts, volume loss.  ENT was consulted for placement of NG Tube with Nasal Bride under the guidance of Fiberoptic Indirect Laryngoscopy.

## 2020-02-01 NOTE — CONSULT NOTE ADULT - CONSULT REASON
Altered mental state
Dysphagia/NGT placement under Laryngoscope with Nasal Bridal.
L ICA aneurysm
Nutritional GOC.
TPN/ Nutrition
Wheezing
elevated BP
rising creatinine

## 2020-02-01 NOTE — CONSULT NOTE ADULT - PROBLEM SELECTOR RECOMMENDATION 9
- Will attempt NGT placement/ Nasal Bride under the guidance of Fiberoptic Indirect Laryngoscopy in am .  - ENT will follow.   - Call with questions.     NATALY GUTIERREZ PA-C.   # [id number]  ENT PA. - Unable to place Nasal Bridal at this time, will have ENT attending to evaluate on Monday 2/3.   - ENT will follow.   - Call with questions.     NATALY GUTIERREZ PA-C.   # [id number]  ENT PA.

## 2020-02-01 NOTE — CHART NOTE - NSCHARTNOTEFT_GEN_A_CORE
Pt seen for dysphagia follow up. Pt found awake with daughter attempting to feed tsp purees. Pt with limited oral response, pooling/residual in anterior oral cavity and no detectable pharyngeal swallow trigger. Education provided to family re: likelihood of silent aspiration given no swallow trigger and no cough response. Family continues to express desire for ngt with "bridle".  Option of ice chips instead of purees/honey was discussed as patient is not benefitting from a nutritional/hydration standpoint at this time from po. Ice chips may allow pt/family a sense of comfort as well and given adequate oral care, may be better tolerated from a respiratory standpoint. This service will continue to follow. d/w MIRI Garcia.

## 2020-02-01 NOTE — PROGRESS NOTE ADULT - SUBJECTIVE AND OBJECTIVE BOX
Patient is a 89y old  Female who presents with a chief complaint of stroke (2020 12:19)      HPI:  Awake, non verbal, slurred speech. Moves all extremities  BP variable  Agitated at times  Swallowing therapy FU noted  PAST MEDICAL & SURGICAL HISTORY:      Review of Systems:   CONSTITUTIONAL: No fever, weight loss, or fatigue  EYES: No eye pain, visual disturbances, or discharge  ENMT:  No difficulty hearing, tinnitus, vertigo; No sinus or throat pain  NECK: No pain or stiffness  BREASTS: No pain, masses, or nipple discharge  RESPIRATORY: No cough, wheezing, chills or hemoptysis; No shortness of breath  CARDIOVASCULAR: No chest pain, palpitations, dizziness, or leg swelling  GASTROINTESTINAL: No abdominal or epigastric pain. No nausea, vomiting, or hematemesis; No diarrhea or constipation. No melena or hematochezia.  GENITOURINARY: No dysuria, frequency, hematuria, or incontinence  NEUROLOGICAL: HPI  SKIN: No itching, burning, rashes, or lesions   LYMPH NODES: No enlarged glands  ENDOCRINE: No heat or cold intolerance; No hair loss  MUSCULOSKELETAL: No joint pain or swelling; No muscle, back, or extremity pain  PSYCHIATRIC: No depression, anxiety, mood swings, or difficulty sleeping  HEME/LYMPH: No easy bruising, or bleeding gums  ALLERY AND IMMUNOLOGIC: No hives or eczema    Allergies    Allergy Status Unknown    Intolerances        Social History:     FAMILY HISTORY:      MEDICATIONS  (STANDING):  ARIPiprazole 2 milliGRAM(s) Oral daily  aspirin Suppository 300 milliGRAM(s) Rectal daily  atorvastatin 80 milliGRAM(s) Oral at bedtime  enoxaparin Injectable 30 milliGRAM(s) SubCutaneous daily  ergocalciferol 12946 Unit(s) Oral every week  levothyroxine Injectable 62.5 MICROGram(s) IV Push at bedtime  sodium chloride 0.9%. 1000 milliLiter(s) (50 mL/Hr) IV Continuous <Continuous>  valproate sodium IVPB 500 milliGRAM(s) IV Intermittent two times a day    MEDICATIONS  (PRN):  labetalol Injectable 10 milliGRAM(s) IV Push three times a day PRN SBP > 180  levalbuterol Inhalation 0.63 milliGRAM(s) Inhalation every 6 hours PRN Shortness of breath/Wheezing        CAPILLARY BLOOD GLUCOSE        I&O's Summary    2020 07:01  -  2020 07:00  --------------------------------------------------------  IN: 335 mL / OUT: 2890 mL / NET: -2555 mL        PHYSICAL EXAM:  Vital Signs Last 24 Hrs  T(C): 36.6 (2020 20:00), Max: 38.3 (2020 07:46)  T(F): 97.8 (2020 20:00), Max: 101 (2020 07:46)  HR: 67 (2020 20:00) (62 - 128)  BP: 173/51 (2020 20:00) (137/125 - 195/62)  BP(mean): 87 (2020 20:00) (75 - 184)  RR: 16 (2020 20:00) (13 - 26)  SpO2: 97% (2020 20:00) (91% - 100%)    GENERAL: NAD, well-developed  HEAD:  Atraumatic, Normocephalic  EYES: EOMI, PERRLA, conjunctiva and sclera clear  NECK: Supple, No JVD  CHEST/LUNG: Clear to auscultation bilaterally; No wheeze  HEART: Regular rate and rhythm; No murmurs, rubs, or gallops  ABDOMEN: Soft, Nontender, Nondistended; Bowel sounds present  EXTREMITIES:  2+ Peripheral Pulses, No clubbing, cyanosis, or edema  PSYCH: AAOx3  NEUROLOGY: non-focal  SKIN: No rashes or lesions    LABS:                        10.4   6.40  )-----------( 114      ( 2020 04:52 )             32.9     01-24    139  |  99  |  47<H>  ----------------------------<  153<H>  4.6   |  21<L>  |  1.57<H>    Ca    9.2      2020 04:52    TPro  7.2  /  Alb  3.8  /  TBili  0.6  /  DBili  x   /  AST  16  /  ALT  7<L>  /  AlkPhos  97  01-23    PT/INR - ( 2020 10:25 )   PT: 12.0 sec;   INR: 1.05 ratio         PTT - ( 2020 10:25 )  PTT:30.9 sec      Urinalysis Basic - ( 2020 16:48 )    Color: Light Yellow / Appearance: Clear / S.030 / pH: x  Gluc: x / Ketone: Negative  / Bili: Negative / Urobili: Negative   Blood: x / Protein: Trace / Nitrite: Negative   Leuk Esterase: Negative / RBC: 15 /hpf / WBC 0 /HPF   Sq Epi: x / Non Sq Epi: 0 /hpf / Bacteria: Negative        RADIOLOGY & ADDITIONAL TESTS:    Imaging Personally Reviewed:    Consultant(s) Notes Reviewed:      Care Discussed with Consultants/Other Providers:

## 2020-02-01 NOTE — PROGRESS NOTE ADULT - PROBLEM SELECTOR PLAN 1
Clinically slightly improved while moving all extremities. However, she is non verbal, unable to swallow properly. Swallowing eval FU noted.  The family understands fully well the risk of aspiration. They are not interested in NT feedings as it would requires physical restraints, and they do not want their mother to be chained up in a cage(SIC)"

## 2020-02-01 NOTE — PROGRESS NOTE ADULT - PROBLEM SELECTOR PLAN 2
Sec to CVA  On Zyprexa and Depacon for agitation. Need to monitor QT for prolonged QTc and on tele for any WCT  May get Psych eval for medication management

## 2020-02-02 NOTE — PROGRESS NOTE ADULT - SUBJECTIVE AND OBJECTIVE BOX
Patient is a 89y old  Female who presents with a chief complaint of stroke (2020 12:19)      HPI:  Lethargic today, non verbal, slurred speech. Moves all extremities    PAST MEDICAL & SURGICAL HISTORY:      Review of Systems:   CONSTITUTIONAL: No fever, weight loss, or fatigue  EYES: No eye pain, visual disturbances, or discharge  ENMT:  No difficulty hearing, tinnitus, vertigo; No sinus or throat pain  NECK: No pain or stiffness  BREASTS: No pain, masses, or nipple discharge  RESPIRATORY: No cough, wheezing, chills or hemoptysis; No shortness of breath  CARDIOVASCULAR: No chest pain, palpitations, dizziness, or leg swelling  GASTROINTESTINAL: No abdominal or epigastric pain. No nausea, vomiting, or hematemesis; No diarrhea or constipation. No melena or hematochezia.  GENITOURINARY: No dysuria, frequency, hematuria, or incontinence  NEUROLOGICAL: HPI  SKIN: No itching, burning, rashes, or lesions   LYMPH NODES: No enlarged glands  ENDOCRINE: No heat or cold intolerance; No hair loss  MUSCULOSKELETAL: No joint pain or swelling; No muscle, back, or extremity pain  PSYCHIATRIC: No depression, anxiety, mood swings, or difficulty sleeping  HEME/LYMPH: No easy bruising, or bleeding gums  ALLERY AND IMMUNOLOGIC: No hives or eczema    Allergies    Allergy Status Unknown    Intolerances        Social History:     FAMILY HISTORY:      MEDICATIONS  (STANDING):  ARIPiprazole 2 milliGRAM(s) Oral daily  aspirin Suppository 300 milliGRAM(s) Rectal daily  atorvastatin 80 milliGRAM(s) Oral at bedtime  enoxaparin Injectable 30 milliGRAM(s) SubCutaneous daily  ergocalciferol 94659 Unit(s) Oral every week  levothyroxine Injectable 62.5 MICROGram(s) IV Push at bedtime  sodium chloride 0.9%. 1000 milliLiter(s) (50 mL/Hr) IV Continuous <Continuous>  valproate sodium IVPB 500 milliGRAM(s) IV Intermittent two times a day    MEDICATIONS  (PRN):  labetalol Injectable 10 milliGRAM(s) IV Push three times a day PRN SBP > 180  levalbuterol Inhalation 0.63 milliGRAM(s) Inhalation every 6 hours PRN Shortness of breath/Wheezing        CAPILLARY BLOOD GLUCOSE        I&O's Summary    2020 07:01  -  2020 07:00  --------------------------------------------------------  IN: 335 mL / OUT: 2890 mL / NET: -2555 mL        PHYSICAL EXAM:  Vital Signs Last 24 Hrs  T(C): 36.6 (2020 20:00), Max: 38.3 (2020 07:46)  T(F): 97.8 (2020 20:00), Max: 101 (2020 07:46)  HR: 67 (2020 20:00) (62 - 128)  BP: 173/51 (2020 20:00) (137/125 - 195/62)  BP(mean): 87 (2020 20:00) (75 - 184)  RR: 16 (2020 20:00) (13 - 26)  SpO2: 97% (2020 20:00) (91% - 100%)    GENERAL: NAD, well-developed  HEAD:  Atraumatic, Normocephalic  EYES: EOMI, PERRLA, conjunctiva and sclera clear  NECK: Supple, No JVD  CHEST/LUNG: Clear to auscultation bilaterally; No wheeze  HEART: Regular rate and rhythm; No murmurs, rubs, or gallops  ABDOMEN: Soft, Nontender, Nondistended; Bowel sounds present  EXTREMITIES:  2+ Peripheral Pulses, No clubbing, cyanosis, or edema  PSYCH: AAOx3  NEUROLOGY: non-focal  SKIN: No rashes or lesions    LABS:                        10.4   6.40  )-----------( 114      ( 2020 04:52 )             32.9     01-24    139  |  99  |  47<H>  ----------------------------<  153<H>  4.6   |  21<L>  |  1.57<H>    Ca    9.2      2020 04:52    TPro  7.2  /  Alb  3.8  /  TBili  0.6  /  DBili  x   /  AST  16  /  ALT  7<L>  /  AlkPhos  97  01-23    PT/INR - ( 2020 10:25 )   PT: 12.0 sec;   INR: 1.05 ratio         PTT - ( 2020 10:25 )  PTT:30.9 sec      Urinalysis Basic - ( 2020 16:48 )    Color: Light Yellow / Appearance: Clear / S.030 / pH: x  Gluc: x / Ketone: Negative  / Bili: Negative / Urobili: Negative   Blood: x / Protein: Trace / Nitrite: Negative   Leuk Esterase: Negative / RBC: 15 /hpf / WBC 0 /HPF   Sq Epi: x / Non Sq Epi: 0 /hpf / Bacteria: Negative        RADIOLOGY & ADDITIONAL TESTS:    Imaging Personally Reviewed:    Consultant(s) Notes Reviewed:      Care Discussed with Consultants/Other Providers:

## 2020-02-03 NOTE — PROGRESS NOTE ADULT - ATTENDING COMMENTS
Detailed GOC discussion held with son, HCP. He is agreeable for palliative care only. He understands the implications, and has accepted them. She will be taken to PCU once bed is available, RAMA Robbins

## 2020-02-03 NOTE — GOALS OF CARE CONVERSATION - ADVANCED CARE PLANNING - CONVERSATION DETAILS
The patient's family re-stated that the patient may not have wanted to prolong her life under the current conditions (dysphagia, dysarthria, and fluctuating mental status with lack of capacity to appropriately interact with her loved ones). They stated that based on prior statements from the patient (like when her  was terminally ill) as well as based on substituted judgement that at this point the patient may have wanted to focus on her comfort. Her family also said that pleasure feeds were tried but did not work (patient was pocketing food and it needed to be suctioned) and that ENT came to place a NGT but that they were not able to do it due to technical and anatomical issues. I also indicate that if a PEG were to be placed that still the patient was going to be dependent on tube feeds, dependent on nursing care for all ADLS, at risk for aspiration, and at risk for infections (particularly PNAs). The patient's children re-stated the patient may not have wanted such a future.      Her son and daughter were still expressing some guilt; however, I tried to bring them to the concept they are really expressing the patient's wishes and not their wishes and therefore that indirectly the patient is making decisions by  herself.     The patient's HCP's decided for transferring the patient to PCU for symptoms monitoring and Rx and for continuing GOC discussions. They do understand that the PCU is an acute care unit and that if the patient were to become stable there that then a dispo plan will need to be discussed. The patient's family re-stated that the patient may not have wanted to prolong her life under the current conditions (dysphagia, dysarthria, and fluctuating mental status with lack of capacity to appropriately interact with her loved ones). They stated that based on prior statements from the patient (like when her  was terminally ill) as well as based on substituted judgement that at this point the patient may have wanted to focus on her comfort. Her family also said that pleasure feeds were tried but did not work (patient was pocketing food and it needed to be suctioned) and that ENT came to place a NGT but that they were not able to do it due to technical and anatomical issues. I also indicate that if a PEG were to be placed that still the patient was going to be dependent on tube feeds, dependent on nursing care for all ADLS, at risk for aspiration, and at risk for infections (particularly PNAs). The patient's children re-stated the patient may not have wanted such a future.      Her son and daughter were still expressing some guilt; however, I tried to bring them to the concept they are really expressing the patient's wishes and not their wishes and therefore that indirectly the patient is making decisions by  herself.     The patient's HCP's decided for transferring the patient to PCU for symptoms monitoring and Rx and for continuing GOC discussions. They do understand that the PCU is an acute care unit and that if the patient were to become stable there that then a dispo plan will need to be discussed.    Will continue current medications (hydralazine, nitroglycerine, Lovenox, Abilify, Synthroid, Lipitor, ASA, Valproic Acid, Synthroid) for now but will f/u a discussion  in regards to non essential medications during the upcoming days and depending on the patient's clinical scenario.     20' were spent in ACP discussion about artificial nutrition and approach to care. No new MOLST was completed.

## 2020-02-03 NOTE — PROGRESS NOTE ADULT - SUBJECTIVE AND OBJECTIVE BOX
Patient is a 89y old  Female who presents with a chief complaint of stroke (2020 12:19)      HPI:  Awake, calm today, non verbal, slurred speech. Moves all extremities    PAST MEDICAL & SURGICAL HISTORY:      Review of Systems:   CONSTITUTIONAL: No fever, weight loss, or fatigue  EYES: No eye pain, visual disturbances, or discharge  ENMT:  No difficulty hearing, tinnitus, vertigo; No sinus or throat pain  NECK: No pain or stiffness  BREASTS: No pain, masses, or nipple discharge  RESPIRATORY: No cough, wheezing, chills or hemoptysis; No shortness of breath  CARDIOVASCULAR: No chest pain, palpitations, dizziness, or leg swelling  GASTROINTESTINAL: No abdominal or epigastric pain. No nausea, vomiting, or hematemesis; No diarrhea or constipation. No melena or hematochezia.  GENITOURINARY: No dysuria, frequency, hematuria, or incontinence  NEUROLOGICAL: HPI  SKIN: No itching, burning, rashes, or lesions   LYMPH NODES: No enlarged glands  ENDOCRINE: No heat or cold intolerance; No hair loss  MUSCULOSKELETAL: No joint pain or swelling; No muscle, back, or extremity pain  PSYCHIATRIC: No depression, anxiety, mood swings, or difficulty sleeping  HEME/LYMPH: No easy bruising, or bleeding gums  ALLERY AND IMMUNOLOGIC: No hives or eczema    Allergies    Allergy Status Unknown    Intolerances        Social History:     FAMILY HISTORY:      MEDICATIONS  (STANDING):  ARIPiprazole 2 milliGRAM(s) Oral daily  aspirin Suppository 300 milliGRAM(s) Rectal daily  atorvastatin 80 milliGRAM(s) Oral at bedtime  enoxaparin Injectable 30 milliGRAM(s) SubCutaneous daily  ergocalciferol 54232 Unit(s) Oral every week  levothyroxine Injectable 62.5 MICROGram(s) IV Push at bedtime  sodium chloride 0.9%. 1000 milliLiter(s) (50 mL/Hr) IV Continuous <Continuous>  valproate sodium IVPB 500 milliGRAM(s) IV Intermittent two times a day    MEDICATIONS  (PRN):  labetalol Injectable 10 milliGRAM(s) IV Push three times a day PRN SBP > 180  levalbuterol Inhalation 0.63 milliGRAM(s) Inhalation every 6 hours PRN Shortness of breath/Wheezing        CAPILLARY BLOOD GLUCOSE        I&O's Summary    2020 07:01  -  2020 07:00  --------------------------------------------------------  IN: 335 mL / OUT: 2890 mL / NET: -2555 mL        PHYSICAL EXAM:  Vital Signs Last 24 Hrs  T(C): 36.6 (2020 20:00), Max: 38.3 (2020 07:46)  T(F): 97.8 (2020 20:00), Max: 101 (2020 07:46)  HR: 67 (2020 20:00) (62 - 128)  BP: 173/51 (2020 20:00) (137/125 - 195/62)  BP(mean): 87 (2020 20:00) (75 - 184)  RR: 16 (2020 20:00) (13 - 26)  SpO2: 97% (2020 20:00) (91% - 100%)    GENERAL: NAD, well-developed  HEAD:  Atraumatic, Normocephalic  EYES: EOMI, PERRLA, conjunctiva and sclera clear  NECK: Supple, No JVD  CHEST/LUNG: Clear to auscultation bilaterally; No wheeze  HEART: Regular rate and rhythm; No murmurs, rubs, or gallops  ABDOMEN: Soft, Nontender, Nondistended; Bowel sounds present  EXTREMITIES:  2+ Peripheral Pulses, No clubbing, cyanosis, or edema  PSYCH: AAOx3  NEUROLOGY: non-focal  SKIN: No rashes or lesions    LABS:                        10.4   6.40  )-----------( 114      ( 2020 04:52 )             32.9     01-24    139  |  99  |  47<H>  ----------------------------<  153<H>  4.6   |  21<L>  |  1.57<H>    Ca    9.2      2020 04:52    TPro  7.2  /  Alb  3.8  /  TBili  0.6  /  DBili  x   /  AST  16  /  ALT  7<L>  /  AlkPhos  97  01-23    PT/INR - ( 2020 10:25 )   PT: 12.0 sec;   INR: 1.05 ratio         PTT - ( 2020 10:25 )  PTT:30.9 sec      Urinalysis Basic - ( 2020 16:48 )    Color: Light Yellow / Appearance: Clear / S.030 / pH: x  Gluc: x / Ketone: Negative  / Bili: Negative / Urobili: Negative   Blood: x / Protein: Trace / Nitrite: Negative   Leuk Esterase: Negative / RBC: 15 /hpf / WBC 0 /HPF   Sq Epi: x / Non Sq Epi: 0 /hpf / Bacteria: Negative        RADIOLOGY & ADDITIONAL TESTS:    Imaging Personally Reviewed:    Consultant(s) Notes Reviewed:      Care Discussed with Consultants/Other Providers:

## 2020-02-04 NOTE — PROGRESS NOTE ADULT - SUBJECTIVE AND OBJECTIVE BOX
GAP TEAM PALLIATIVE CARE UNIT PROGRESS NOTE:      [  ] Patient on hospice program.    INDICATION FOR PALLIATIVE CARE UNIT SERVICES:    INTERVAL HPI/OVERNIGHT EVENTS:    DNR on chart: Yes    Allergies    Allergy Status Unknown    Intolerances    MEDICATIONS  (STANDING):  ARIPiprazole 2 milliGRAM(s) Oral daily  aspirin Suppository 300 milliGRAM(s) Rectal daily  atorvastatin 80 milliGRAM(s) Oral at bedtime  enoxaparin Injectable 30 milliGRAM(s) SubCutaneous daily  ergocalciferol 62528 Unit(s) Oral every week  hydrALAZINE Injectable 10 milliGRAM(s) IV Push every 6 hours  levothyroxine Injectable 62.5 MICROGram(s) IV Push at bedtime  nitroglycerin    2% Ointment 0.5 Inch(s) Transdermal daily  OLANZapine Injectable 5 milliGRAM(s) IntraMuscular once  valproate sodium IVPB 500 milliGRAM(s) IV Intermittent two times a day    MEDICATIONS  (PRN):  acetaminophen  Suppository .. 650 milliGRAM(s) Rectal every 6 hours PRN Mild Pain (1 - 3)  glycopyrrolate Injectable 0.4 milliGRAM(s) IV Push every 4 hours PRN oropharyngeal secretions.  levalbuterol Inhalation 0.63 milliGRAM(s) Inhalation every 6 hours PRN Shortness of breath/Wheezing  LORazepam   Injectable 0.5 milliGRAM(s) IV Push every 1 hour PRN Agitation/Anxiety  morphine  - Injectable 0.5 milliGRAM(s) IV Push every 1 hour PRN Pain  morphine  - Injectable 0.5 milliGRAM(s) IV Push every 1 hour PRN Dyspnea    ITEMS UNCHECKED ARE NOT PRESENT    PRESENT SYMPTOMS: [ ]Unable to obtain due to poor mentation   Source if other than patient:  [ ]Family   [ ]Team     Pain: [ ] yes [ ] no  QOL impact -   Location -                    Aggravating factors -  Quality -  Radiation -  Timing-  Severity (0-10 scale):  Minimal acceptable level (0-10 scale):     Dyspnea:                           [ ]Mild [ ]Moderate [ ]Severe  Anxiety:                             [ ]Mild [ ]Moderate [ ]Severe  Fatigue:                             [ ]Mild [ ]Moderate [ ]Severe  Nausea:                             [ ]Mild [ ]Moderate [ ]Severe  Loss of appetite:              [ ]Mild [ ]Moderate [ ]Severe  Constipation:                    [ ]Mild [ ]Moderate [ ]Severe    PAINAD Score:    [email or web address]/painad.pdf (Ctrl +  left click to view)  		  Other Symptoms:  [ ]All other review of systems negative     Palliative Performance Status Version 2:         %         http://npcrc.org/files/news/palliative_performance_scale_ppsv2.pdf  PHYSICAL EXAM:  Vital Signs Last 24 Hrs  T(C): 36.9 (04 Feb 2020 08:22), Max: 36.9 (04 Feb 2020 08:22)  T(F): 98.5 (04 Feb 2020 08:22), Max: 98.5 (04 Feb 2020 08:22)  HR: 82 (04 Feb 2020 08:22) (74 - 82)  BP: 118/61 (04 Feb 2020 08:22) (118/61 - 156/87)  BP(mean): --  RR: 16 (04 Feb 2020 08:22) (16 - 19)  SpO2: 97% (04 Feb 2020 08:22) (95% - 98%) I&O's Summary    03 Feb 2020 07:01  -  04 Feb 2020 07:00  --------------------------------------------------------  IN: 0 mL / OUT: 200 mL / NET: -200 mL    GENERAL:  [ ]Alert  [ ]Oriented x   [ ]Lethargic  [ ]Cachexia  [ ]Unarousable  [ ]Verbal  [ ]Non-Verbal  Behavioral:   [ ] Anxiety  [ ] Delirium [ ] Agitation [ ] Other  HEENT:  [ ]Normal   [ ]Dry mouth   [ ]ET Tube/Trach  [ ]Oral lesions  PULMONARY:   [ ]Clear [ ]Tachypnea  [ ]Audible excessive secretions   [ ]Rhonchi        [ ]Right [ ]Left [ ]Bilateral  [ ]Crackles        [ ]Right [ ]Left [ ]Bilateral  [ ]Wheezing     [ ]Right [ ]Left [ ]Bilateral  [ ]Diminshed BS [ ]Right [ ]Left [ ]Bilateral    CARDIOVASCULAR:    [ ]Regular [ ]Irregular [ ]Tachy  [ ]Hung [ ]Murmur [ ]Other  GASTROINTESTINAL:  [ ]Soft  [ ]Distended   [ ]+BS  [ ]Non tender [ ]Tender  [ ]PEG [ ]OGT/ NGT   Last BM:       GENITOURINARY:  [ ]Normal [ ] Incontinent   [ ]Oliguria/Anuria   [ ]Siddiqi  MUSCULOSKELETAL:   [ ]Normal   [ ]Weakness  [ ]Bed/Wheelchair bound [ ]Edema  NEUROLOGIC:   [ ]No focal deficits  [ ] Cognitive impairment  [ ] Dysphagia [ ]Dysarthria [ ] Paresis [ ]Other   SKIN:   [ ]Normal  [ ]Rash     [ ]Pressure ulcer(s)  [ ]y [ ]n  Present on admission      CRITICAL CARE:  [ ] Shock Present  [ ]Septic [ ]Cardiogenic [ ]Neurologic [ ]Hypovolemic  [ ]  Vasopressors [ ]  Inotropes   [ ] Respiratory failure present [ ] Mechanical Ventilation [ ] Non-invasive ventilatory support [ ] High-Flow  [ ] Acute  [ ] Chronic [ ] Hypoxic  [ ] Hypercarbic [ ] Other  [ ] Other organ failure     LABS:                        12.8   6.91  )-----------( 111      ( 03 Feb 2020 06:32 )             40.0   02-03    138  |  99  |  22  ----------------------------<  103<H>  3.3<L>   |  24  |  0.94    Ca    8.9      03 Feb 2020 06:32          RADIOLOGY & ADDITIONAL STUDIES:    PROTEIN CALORIE MALNUTRITION: [ ] mild [ ] moderate [ ] severe  [ ] underweight [ ] morbid obesity    https://www.andeal.org/vault/2440/web/files/ONC/Table_Clinical%20Characteristics%20to%20Document%20Malnutrition-White%20JV%20et%20al%167338.pdf    Height (cm): 152.4 (01-23-20 @ 13:10)  Weight (kg): 74 (01-23-20 @ 13:10)  BMI (kg/m2): 31.9 (01-23-20 @ 13:10)    [ ] PPSV2 < or = 30% [ ] significant weight loss [ ] poor nutritional intake [ ] anasarca Prealbumin, Serum: 14 mg/dL (01-30-20 @ 09:34)    Artificial Nutrition [ ]     REFERRALS:   [ ]Chaplaincy  [ ] Hospice  [ ]Child Life  [ ]Social Work  [ ]Case management [ ]Holistic Therapy [ ] Physical Therapy [ ] Dietary   Goals of Care Document: GAP TEAM PALLIATIVE CARE UNIT PROGRESS NOTE:      [  ] Patient on hospice program.    INDICATION FOR PALLIATIVE CARE UNIT SERVICES:  Acute ischemic LMCA infarct with hemorraghic conversion - likely cardioembolic in setting of Afib. PCU admission for symptom management agitation and dyspnea, ongoing GOC conversations     INTERVAL HPI/OVERNIGHT EVENTS:  No acute events overnight   Alert today and conversation with family  No episodes of agitation during the day  GOC conversation held with family, still experiencing guilt about mother's nutritional status, but do not want aggressive measure such as PEG placement, or NGT. May want to repeat S/S eval now that the patient is more alert.     DNR on chart: Yes    Allergies  Allergy Status Unknown  Intolerances    MEDICATIONS  (STANDING):  ARIPiprazole 2 milliGRAM(s) Oral daily  aspirin Suppository 300 milliGRAM(s) Rectal daily  atorvastatin 80 milliGRAM(s) Oral at bedtime  enoxaparin Injectable 30 milliGRAM(s) SubCutaneous daily  ergocalciferol 41393 Unit(s) Oral every week  hydrALAZINE Injectable 10 milliGRAM(s) IV Push every 6 hours  levothyroxine Injectable 62.5 MICROGram(s) IV Push at bedtime  nitroglycerin    2% Ointment 0.5 Inch(s) Transdermal daily  OLANZapine Injectable 5 milliGRAM(s) IntraMuscular once  valproate sodium IVPB 500 milliGRAM(s) IV Intermittent two times a day    MEDICATIONS  (PRN):  acetaminophen  Suppository .. 650 milliGRAM(s) Rectal every 6 hours PRN Mild Pain (1 - 3)  glycopyrrolate Injectable 0.4 milliGRAM(s) IV Push every 4 hours PRN oropharyngeal secretions.  levalbuterol Inhalation 0.63 milliGRAM(s) Inhalation every 6 hours PRN Shortness of breath/Wheezing  LORazepam   Injectable 0.5 milliGRAM(s) IV Push every 1 hour PRN Agitation/Anxiety  morphine  - Injectable 0.5 milliGRAM(s) IV Push every 1 hour PRN Pain  morphine  - Injectable 0.5 milliGRAM(s) IV Push every 1 hour PRN Dyspnea    ITEMS UNCHECKED ARE NOT PRESENT    PRESENT SYMPTOMS: [x]Unable to obtain due to poor mentation   Source if other than patient:  [x]Family   [ ]Team     Pain: [ ] yes [x] no  QOL impact -   Location -                    Aggravating factors -  Quality -  Radiation -  Timing-  Severity (0-10 scale):  Minimal acceptable level (0-10 scale):     Dyspnea:                           [ ]Mild [ ]Moderate [ ]Severe  Anxiety:                             [ ]Mild [ ]Moderate [ ]Severe  Fatigue:                             [ ]Mild [ ]Moderate [ ]Severe  Nausea:                             [ ]Mild [ ]Moderate [ ]Severe  Loss of appetite:              [ ]Mild [ ]Moderate [ ]Severe  Constipation:                    [ ]Mild [ ]Moderate [ ]Severe    PAINAD Score: 1    [email or web address].[email or web address]/[email or web address] (Ctrl +  left click to view)  		  Other Symptoms:  [x]All other review of systems negative     Palliative Performance Status Version 2:  20%         http://[email or web address]/files/news/palliative_performance_scale_[email or web address].pdf  PHYSICAL EXAM:  Vital Signs Last 24 Hrs  T(C): 36.9 (04 Feb 2020 08:22), Max: 36.9 (04 Feb 2020 08:22)  T(F): 98.5 (04 Feb 2020 08:22), Max: 98.5 (04 Feb 2020 08:22)  HR: 82 (04 Feb 2020 08:22) (74 - 82)  BP: 118/61 (04 Feb 2020 08:22) (118/61 - 156/87)  BP(mean): --  RR: 16 (04 Feb 2020 08:22) (16 - 19)  SpO2: 97% (04 Feb 2020 08:22) (95% - 98%) I&O's Summary    03 Feb 2020 07:01  -  04 Feb 2020 07:00  --------------------------------------------------------  IN: 0 mL / OUT: 200 mL / NET: -200 mL    GENERAL:  [x]Alert  [ ]Oriented x   [ ]Lethargic  [ ]Cachexia  [ ]Unarousable  [ ]Verbal  [ ]Non-Verbal  Behavioral:   [ ] Anxiety  [ ] Delirium [ ] Agitation [ ] Other  HEENT:  [ ]Normal   [x]Dry mouth   [ ]ET Tube/Trach  [ ]Oral lesions  PULMONARY:   [x]Clear [ ]Tachypnea  [ ]Audible excessive secretions   [ ]Rhonchi        [ ]Right [ ]Left [ ]Bilateral  [ ]Crackles        [ ]Right [ ]Left [ ]Bilateral  [ ]Wheezing     [ ]Right [ ]Left [ ]Bilateral  [ Diminished BS [ ]Right [ ]Left [ ]Bilateral    CARDIOVASCULAR:    [x]Regular [ ]Irregular [ ]Tachy  [ ]Hung [ ]Murmur [ ]Other  GASTROINTESTINAL:  [x]Soft  [ ]Distended   [x]+BS  [x]Non tender [ ]Tender  [ ]PEG [ ]OGT/ NGT   Last BM:       GENITOURINARY:  [ ]Normal [ ] Incontinent   [ ]Oliguria/Anuria   [x]Siddiqi  MUSCULOSKELETAL:   [ ]Normal   [x]Weakness  [ ]Bed/Wheelchair bound [ ]Edema  NEUROLOGIC:   [ ]No focal deficits  [ ] Cognitive impairment  [x] Dysphagia [x]Dysarthria [ ] Paresis [x]Other - Facial droop   SKIN:   [x]Normal  [ ]Rash     [ ]Pressure ulcer(s)  [ ]y [ ]n  Present on admission      CRITICAL CARE:  [ ] Shock Present  [ ]Septic [ ]Cardiogenic [ ]Neurologic [ ]Hypovolemic  [ ]  Vasopressors [ ]  Inotropes   [ ] Respiratory failure present [ ] Mechanical Ventilation [ ] Non-invasive ventilatory support [ ] High-Flow  [ ] Acute  [ ] Chronic [ ] Hypoxic  [ ] Hypercarbic [ ] Other  [ ] Other organ failure     LABS:                        12.8   6.91  )-----------( 111      ( 03 Feb 2020 06:32 )             40.0   02-03    138  |  99  |  22  ----------------------------<  103<H>  3.3<L>   |  24  |  0.94    Ca    8.9      03 Feb 2020 06:32    PROTEIN CALORIE MALNUTRITION: [ ] mild [ ] moderate [ ] severe  [ ] underweight [ ] morbid obesity    https://www.andeal.org/vault/2440/web/files/ONC/Table_Clinical%20Characteristics%20to%20Document%20Malnutrition-White%20JV%20et%20al%202012.pdf    Height (cm): 152.4 (01-23-20 @ 13:10)  Weight (kg): 74 (01-23-20 @ 13:10)  BMI (kg/m2): 31.9 (01-23-20 @ 13:10)    [x] PPSV2 < or = 30% [ ] significant weight loss [ ] poor nutritional intake [ ] anasarca Prealbumin, Serum: 14 mg/dL (01-30-20 @ 09:34)    Artificial Nutrition [ ]     REFERRALS:   [ ]Chaplaincy  [ ] Hospice  [ ]Child Life  [ ]Social Work  [ ]Case management [ ]Holistic Therapy [ ] Physical Therapy [ ] Dietary   Goals of Care Document: GAP TEAM PALLIATIVE CARE UNIT PROGRESS NOTE:      [  ] Patient on hospice program.    INDICATION FOR PALLIATIVE CARE UNIT SERVICES:  Acute ischemic LMCA infarct with hemorraghic conversion - likely cardioembolic in setting of Afib. PCU admission for symptom management agitation and dyspnea, ongoing GOC conversations     INTERVAL HPI/OVERNIGHT EVENTS:  No acute events overnight   Alert today and conversation with family  No episodes of agitation during the day  GOC conversation held with family, still experiencing guilt about mother's nutritional status, but do not want aggressive measure such as PEG placement, or NGT. May want to repeat S/S eval now that the patient is more alert.     DNR on chart: Yes    Allergies  Allergy Status Unknown  Intolerances    MEDICATIONS  (STANDING):  ARIPiprazole 2 milliGRAM(s) Oral daily  aspirin Suppository 300 milliGRAM(s) Rectal daily  atorvastatin 80 milliGRAM(s) Oral at bedtime  enoxaparin Injectable 30 milliGRAM(s) SubCutaneous daily  ergocalciferol 71829 Unit(s) Oral every week  hydrALAZINE Injectable 10 milliGRAM(s) IV Push every 6 hours  levothyroxine Injectable 62.5 MICROGram(s) IV Push at bedtime  nitroglycerin    2% Ointment 0.5 Inch(s) Transdermal daily  OLANZapine Injectable 5 milliGRAM(s) IntraMuscular once  valproate sodium IVPB 500 milliGRAM(s) IV Intermittent two times a day    MEDICATIONS  (PRN):  acetaminophen  Suppository .. 650 milliGRAM(s) Rectal every 6 hours PRN Mild Pain (1 - 3)  glycopyrrolate Injectable 0.4 milliGRAM(s) IV Push every 4 hours PRN oropharyngeal secretions.  levalbuterol Inhalation 0.63 milliGRAM(s) Inhalation every 6 hours PRN Shortness of breath/Wheezing  LORazepam   Injectable 0.5 milliGRAM(s) IV Push every 1 hour PRN Agitation/Anxiety  morphine  - Injectable 0.5 milliGRAM(s) IV Push every 1 hour PRN Pain  morphine  - Injectable 0.5 milliGRAM(s) IV Push every 1 hour PRN Dyspnea    ITEMS UNCHECKED ARE NOT PRESENT    PRESENT SYMPTOMS: [x]Unable to obtain due to poor mentation   Source if other than patient:  [x]Family   [ ]Team     Pain: [ ] yes [x] no  QOL impact -   Location -                    Aggravating factors -  Quality -  Radiation -  Timing-  Severity (0-10 scale):  Minimal acceptable level (0-10 scale):     Dyspnea:                           [ ]Mild [ ]Moderate [ ]Severe  Anxiety:                             [ ]Mild [ ]Moderate [ ]Severe  Fatigue:                             [ ]Mild [ ]Moderate [ ]Severe  Nausea:                             [ ]Mild [ ]Moderate [ ]Severe  Loss of appetite:              [ ]Mild [ ]Moderate [ ]Severe  Constipation:                    [ ]Mild [ ]Moderate [ ]Severe    PAINAD Score: 1    [email or web address]/painad.pdf (Ctrl +  left click to view)  		  Other Symptoms:  [x]All other review of systems negative     Palliative Performance Status Version 2:  20%         http://[email or web address]/files/news/palliative_performance_scale_[email or web address].pdf  PHYSICAL EXAM:  Vital Signs Last 24 Hrs  T(C): 36.9 (04 Feb 2020 08:22), Max: 36.9 (04 Feb 2020 08:22)  T(F): 98.5 (04 Feb 2020 08:22), Max: 98.5 (04 Feb 2020 08:22)  HR: 82 (04 Feb 2020 08:22) (74 - 82)  BP: 118/61 (04 Feb 2020 08:22) (118/61 - 156/87)  BP(mean): --  RR: 16 (04 Feb 2020 08:22) (16 - 19)  SpO2: 97% (04 Feb 2020 08:22) (95% - 98%) I&O's Summary    03 Feb 2020 07:01  -  04 Feb 2020 07:00  --------------------------------------------------------  IN: 0 mL / OUT: 200 mL / NET: -200 mL    GENERAL: Native language Hallie - family states she is confused and would not be able to converse with a .  [x]Alert  [ ]Oriented x   [ ]Lethargic  [ ]Cachexia  [ ]Unarousable  [x ]Verbal  but difficult to understand [ ]Non-Verbal  Behavioral:   [ ] Anxiety  [ ] Delirium [ ] Agitation [ ] Other  HEENT:  [ ]Normal   [x]Dry mouth   [ ]ET Tube/Trach  [ ]Oral lesions  PULMONARY:   [x]Clear [ ]Tachypnea  [ ]Audible excessive secretions   [ ]Rhonchi        [ ]Right [ ]Left [ ]Bilateral  [ ]Crackles        [ ]Right [ ]Left [ ]Bilateral  [ ]Wheezing     [ ]Right [ ]Left [ ]Bilateral  [ Diminished BS [ ]Right [ ]Left [ ]Bilateral    CARDIOVASCULAR:    [x]Regular [ ]Irregular [ ]Tachy  [ ]Hung [ ]Murmur [ ]Other +S1 +S2   GASTROINTESTINAL:  [x]Soft  [ ]Distended   [x]+BS  [x]Non tender [ ]Tender  [ ]PEG [ ]OGT/ NGT   Last BM:   fecal incontinence 2/4/20     GENITOURINARY:  [ ]Normal [x ] Incontinent   [ ]Oliguria/Anuria   [x]Siddiqi  MUSCULOSKELETAL:   [ ]Normal   [x]Weakness  [ ]Bed/Wheelchair bound [ ]Edema  NEUROLOGIC:   [ ]No focal deficits  [x ] Cognitive impairment  [x] Dysphagia [x]Dysarthria [ ] Paresis [x]Other - Facial droop   SKIN:   [x]Normal  [ ]Rash     [ ]Pressure ulcer(s)  [ ]y [ ]n  Present on admission      CRITICAL CARE:  [ ] Shock Present  [ ]Septic [ ]Cardiogenic [ ]Neurologic [ ]Hypovolemic  [ ]  Vasopressors [ ]  Inotropes   [ ] Respiratory failure present [ ] Mechanical Ventilation [ ] Non-invasive ventilatory support [ ] High-Flow  [ ] Acute  [ ] Chronic [ ] Hypoxic  [ ] Hypercarbic [ ] Other  [ ] Other organ failure     LABS:                        12.8   6.91  )-----------( 111      ( 03 Feb 2020 06:32 )             40.0   02-03    138  |  99  |  22  ----------------------------<  103<H>  3.3<L>   |  24  |  0.94    Ca    8.9      03 Feb 2020 06:32    RADIOLOGY AND OTHER STUDIES: None new.     PROTEIN CALORIE MALNUTRITION: [ ] mild [ ] moderate [ ] severe  [ ] underweight [ ] morbid obesity    https://www.andeal.org/vault/9330/web/files/ONC/Table_Clinical%20Characteristics%20to%20Document%20Malnutrition-White%20JV%20et%20al%202012.pdf    Height (cm): 152.4 (01-23-20 @ 13:10)  Weight (kg): 74 (01-23-20 @ 13:10)  BMI (kg/m2): 31.9 (01-23-20 @ 13:10)    [x] PPSV2 < or = 30% [ ] significant weight loss [ ] poor nutritional intake [ ] anasarca  Prealbumin, Serum: 14 mg/dL (01-30-20 @ 09:34)    Artificial Nutrition [ ]     REFERRALS:   x[ ]Chaplaincy  [ ] Hospice  [ ]Child Life  [ x]Social Work  [ ]Case management [ ]Holistic Therapy [ ] Physical Therapy [ ] Dietary   Goals of Care Document:

## 2020-02-04 NOTE — PROGRESS NOTE ADULT - PROBLEM SELECTOR PLAN 1
Likely related to acute CVA, and prior history of advanced dementia, prior CVAs   - Consciousness and alertness is waxing and waning  - Depacon 500 mg BID for agitation  - Ativan 0.5mg Q1hr prn for agitation - no prns required overnight

## 2020-02-04 NOTE — PROGRESS NOTE ADULT - PROBLEM SELECTOR PLAN 3
Progressive Dysphagia this hospital course likely exacerbated by AMS and acute CVA, failed speech and swallow eval 2/3.   -GOC: HCP does not want PEG, which are aligned with patient's goals  -Will observe patient's alertness this week and consider repeating s/s eval

## 2020-02-04 NOTE — PROGRESS NOTE ADULT - ATTENDING COMMENTS
I have personally seen and examined this patient and agree with the above assessment and plan, which I have reviewed and edited where appropriate.     GOC: Symptom management in the setting of CVA and advanced dementia  Symptoms: Delirium  Disposition: Ongoing discussions regarding plan.  Family states that due to their own health concerns it would be impossible for them to take care of her at home and there are no resources for private hire.  Hospice evaluation.

## 2020-02-04 NOTE — PROGRESS NOTE ADULT - PROBLEM SELECTOR PLAN 6
GOC: DNR, DNI, no PEG  Dispo: Unclear as family would like to keep patient in PCU until demise, however understand that this is not a long term solution. Will refer to social work for options.

## 2020-02-04 NOTE — PROGRESS NOTE ADULT - ASSESSMENT
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 Transferred to PCU for symptoms management (Agitation and dyspnea) and continued GOC conversations

## 2020-02-05 NOTE — PROGRESS NOTE ADULT - PROBLEM SELECTOR PLAN 1
Likely related to acute CVA, and prior history of advanced dementia, prior CVAs   - Consciousness and alertness is waxing and waning  - Depacon 500 mg BID for agitation  - Required 2 doses of prn for agitation   - Ativan 0.5mg Q1hr prn for agitation - no prns required overnight Likely related to acute CVA, and prior history of advanced dementia, prior CVAs   - Consciousness and alertness is waxing and waning  - Required 2 doses of prn for agitation   - Ativan 0.5mg Q1hr prn for agitation - no prns required overnight  Monitor for constipation, urinary retention, pain, hunger, thirst, etc.  Promote sleep wake cycle and reorientation as indicated.

## 2020-02-05 NOTE — PROGRESS NOTE ADULT - PROBLEM SELECTOR PLAN 6
GOC: DNR, DNI, no PEG  Dispo: Unclear as family would like to keep patient in PCU until demise, however understand that this is not a long term placement. Family cannot care for patient at home. Will refer to social work for options. Met with family at bedside.  Significant existential distress over patient inability to eat.  Provided education surrounding nutritional needs at the end of life.

## 2020-02-05 NOTE — CHART NOTE - NSCHARTNOTEFT_GEN_A_CORE
Upon Nutritional Assessment by the Registered Dietitian your patient was determined to meet criteria / has evidence of the following diagnosis/diagnoses:          [ x]  Mild Protein Calorie Malnutrition        [ ]  Moderate Protein Calorie Malnutrition        [ ] Severe Protein Calorie Malnutrition        [ ] Unspecified Protein Calorie Malnutrition        [ ] Underweight / BMI <19        [ ] Morbid Obesity / BMI > 40      Findings as based on:  [ x] Comprehensive nutrition assessment: Pt meeting <50% estimated needs for <1 week   [ ] Nutrition Focused Physical Exam  [ ] Other:       Nutrition Plan/Recommendations:  Pt currently receiving pleasure feeds dysphagia 1 diet with honey consistency liquids, no plan for tube feeding at this time.         PROVIDER Section:     By signing this assessment you are acknowledging and agree with the diagnosis/diagnoses assigned by the Registered Dietitian    Comments:

## 2020-02-05 NOTE — PROGRESS NOTE ADULT - SUBJECTIVE AND OBJECTIVE BOX
GAP TEAM PALLIATIVE CARE UNIT PROGRESS NOTE:      [  ] Patient on hospice program.    INDICATION FOR PALLIATIVE CARE UNIT SERVICES:    INTERVAL HPI/OVERNIGHT EVENTS:    DNR on chart: Yes    Allergies    Allergy Status Unknown    Intolerances    MEDICATIONS  (STANDING):  ARIPiprazole 2 milliGRAM(s) Oral daily  aspirin Suppository 300 milliGRAM(s) Rectal daily  atorvastatin 80 milliGRAM(s) Oral at bedtime  enoxaparin Injectable 30 milliGRAM(s) SubCutaneous daily  ergocalciferol 83228 Unit(s) Oral every week  hydrALAZINE Injectable 10 milliGRAM(s) IV Push every 6 hours  levothyroxine Injectable 62.5 MICROGram(s) IV Push at bedtime  nitroglycerin    2% Ointment 0.5 Inch(s) Transdermal daily  valproate sodium IVPB 500 milliGRAM(s) IV Intermittent two times a day    MEDICATIONS  (PRN):  acetaminophen  Suppository .. 650 milliGRAM(s) Rectal every 6 hours PRN Mild Pain (1 - 3)  glycopyrrolate Injectable 0.4 milliGRAM(s) IV Push every 4 hours PRN oropharyngeal secretions.  levalbuterol Inhalation 0.63 milliGRAM(s) Inhalation every 6 hours PRN Shortness of breath/Wheezing  LORazepam   Injectable 0.5 milliGRAM(s) IV Push every 1 hour PRN Agitation/Anxiety  morphine  - Injectable 0.5 milliGRAM(s) IV Push every 1 hour PRN Pain  morphine  - Injectable 0.5 milliGRAM(s) IV Push every 1 hour PRN Dyspnea    ITEMS UNCHECKED ARE NOT PRESENT    PRESENT SYMPTOMS: [ ]Unable to obtain due to poor mentation   Source if other than patient:  [ ]Family   [ ]Team     Pain: [ ] yes [ ] no  QOL impact -   Location -                    Aggravating factors -  Quality -  Radiation -  Timing-  Severity (0-10 scale):  Minimal acceptable level (0-10 scale):     Dyspnea:                           [ ]Mild [ ]Moderate [ ]Severe  Anxiety:                             [ ]Mild [ ]Moderate [ ]Severe  Fatigue:                             [ ]Mild [ ]Moderate [ ]Severe  Nausea:                             [ ]Mild [ ]Moderate [ ]Severe  Loss of appetite:              [ ]Mild [ ]Moderate [ ]Severe  Constipation:                    [ ]Mild [ ]Moderate [ ]Severe    PAINAD Score:    [email or web address]/cog/painad.pdf (Ctrl +  left click to view)  		  Other Symptoms:  [ ]All other review of systems negative     Palliative Performance Status Version 2:         %         http://npcrc.org/files/news/palliative_performance_scale_[email or web address].pdf  PHYSICAL EXAM:  Vital Signs Last 24 Hrs  T(C): 36.4 (05 Feb 2020 08:22), Max: 36.4 (05 Feb 2020 08:22)  T(F): 97.6 (05 Feb 2020 08:22), Max: 97.6 (05 Feb 2020 08:22)  HR: 62 (05 Feb 2020 08:22) (62 - 84)  BP: 139/77 (05 Feb 2020 08:22) (122/68 - 139/77)  BP(mean): --  RR: 18 (05 Feb 2020 08:22) (18 - 18)  SpO2: 99% (05 Feb 2020 08:22) (99% - 99%) I&O's Summary    04 Feb 2020 07:01  -  05 Feb 2020 07:00  --------------------------------------------------------  IN: 0 mL / OUT: 150 mL / NET: -150 mL    GENERAL:  [ ]Alert  [ ]Oriented x   [ ]Lethargic  [ ]Cachexia  [ ]Unarousable  [ ]Verbal  [ ]Non-Verbal  Behavioral:   [ ] Anxiety  [ ] Delirium [ ] Agitation [ ] Other  HEENT:  [ ]Normal   [ ]Dry mouth   [ ]ET Tube/Trach  [ ]Oral lesions  PULMONARY:   [ ]Clear [ ]Tachypnea  [ ]Audible excessive secretions   [ ]Rhonchi        [ ]Right [ ]Left [ ]Bilateral  [ ]Crackles        [ ]Right [ ]Left [ ]Bilateral  [ ]Wheezing     [ ]Right [ ]Left [ ]Bilateral  [ ]Diminshed BS [ ]Right [ ]Left [ ]Bilateral    CARDIOVASCULAR:    [ ]Regular [ ]Irregular [ ]Tachy  [ ]Hung [ ]Murmur [ ]Other  GASTROINTESTINAL:  [ ]Soft  [ ]Distended   [ ]+BS  [ ]Non tender [ ]Tender  [ ]PEG [ ]OGT/ NGT   Last BM:       GENITOURINARY:  [ ]Normal [ ] Incontinent   [ ]Oliguria/Anuria   [ ]Siddiqi  MUSCULOSKELETAL:   [ ]Normal   [ ]Weakness  [ ]Bed/Wheelchair bound [ ]Edema  NEUROLOGIC:   [ ]No focal deficits  [ ] Cognitive impairment  [ ] Dysphagia [ ]Dysarthria [ ] Paresis [ ]Other   SKIN:   [ ]Normal  [ ]Rash     [ ]Pressure ulcer(s)  [ ]y [ ]n  Present on admission      CRITICAL CARE:  [ ] Shock Present  [ ]Septic [ ]Cardiogenic [ ]Neurologic [ ]Hypovolemic  [ ]  Vasopressors [ ]  Inotropes   [ ] Respiratory failure present [ ] Mechanical Ventilation [ ] Non-invasive ventilatory support [ ] High-Flow  [ ] Acute  [ ] Chronic [ ] Hypoxic  [ ] Hypercarbic [ ] Other  [ ] Other organ failure     LABS:            RADIOLOGY & ADDITIONAL STUDIES:    PROTEIN CALORIE MALNUTRITION: [ ] mild [ ] moderate [ ] severe  [ ] underweight [ ] morbid obesity    https://www.andeal.org/vault/2440/web/files/ONC/Table_Clinical%20Characteristics%20to%20Document%20Malnutrition-White%20JV%20et%20al%741082.pdf    Height (cm): 152.4 (01-23-20 @ 13:10)  Weight (kg): 74 (01-23-20 @ 13:10)  BMI (kg/m2): 31.9 (01-23-20 @ 13:10)    [ ] PPSV2 < or = 30% [ ] significant weight loss [ ] poor nutritional intake [ ] anasarca Prealbumin, Serum: 14 mg/dL (01-30-20 @ 09:34)    Artificial Nutrition [ ]     REFERRALS:   [ ]Chaplaincy  [ ] Hospice  [ ]Child Life  [ ]Social Work  [ ]Case management [ ]Holistic Therapy [ ] Physical Therapy [ ] Dietary   Goals of Care Document: GAP TEAM PALLIATIVE CARE UNIT PROGRESS NOTE:      [  ] Patient on hospice program.    INDICATION FOR PALLIATIVE CARE UNIT SERVICES:  Metabolic encephalopathy in the setting of acute CVA in a patient with advanced dementia, PCU admission for symptom management (agitation and dyspnea), GOC ongoing.     INTERVAL HPI/OVERNIGHT EVENTS:  Required two doses of prn ativan overnight.     DNR on chart: Yes    Allergies  Allergy Status Unknown  Intolerances    MEDICATIONS  (STANDING):  ARIPiprazole 2 milliGRAM(s) Oral daily  aspirin Suppository 300 milliGRAM(s) Rectal daily  atorvastatin 80 milliGRAM(s) Oral at bedtime  enoxaparin Injectable 30 milliGRAM(s) SubCutaneous daily  ergocalciferol 32951 Unit(s) Oral every week  hydrALAZINE Injectable 10 milliGRAM(s) IV Push every 6 hours  levothyroxine Injectable 62.5 MICROGram(s) IV Push at bedtime  nitroglycerin    2% Ointment 0.5 Inch(s) Transdermal daily  valproate sodium IVPB 500 milliGRAM(s) IV Intermittent two times a day    MEDICATIONS  (PRN):  acetaminophen  Suppository .. 650 milliGRAM(s) Rectal every 6 hours PRN Mild Pain (1 - 3)  glycopyrrolate Injectable 0.4 milliGRAM(s) IV Push every 4 hours PRN oropharyngeal secretions.  levalbuterol Inhalation 0.63 milliGRAM(s) Inhalation every 6 hours PRN Shortness of breath/Wheezing  LORazepam   Injectable 0.5 milliGRAM(s) IV Push every 1 hour PRN Agitation/Anxiety  morphine  - Injectable 0.5 milliGRAM(s) IV Push every 1 hour PRN Pain  morphine  - Injectable 0.5 milliGRAM(s) IV Push every 1 hour PRN Dyspnea    ITEMS UNCHECKED ARE NOT PRESENT    PRESENT SYMPTOMS: [x]Unable to obtain due to poor mentation   Source if other than patient:  [ ]Family   [x]Team     Pain: [ ] yes [ ] no  QOL impact -   Location -                    Aggravating factors -  Quality -  Radiation -  Timing-  Severity (0-10 scale):  Minimal acceptable level (0-10 scale):     Dyspnea:                           [ ]Mild [ ]Moderate [ ]Severe  Anxiety:                             [ ]Mild [ ]Moderate [ ]Severe  Fatigue:                             [ ]Mild [ ]Moderate [ ]Severe  Nausea:                             [ ]Mild [ ]Moderate [ ]Severe  Loss of appetite:              [ ]Mild [ ]Moderate [ ]Severe  Constipation:                    [ ]Mild [ ]Moderate [ ]Severe    PAINAD Score:    [email or web address].[email or web address]/cog/painad.pdf (Ctrl +  left click to view)  		  Other Symptoms:  [ ]All other review of systems negative     Palliative Performance Status Version 2:         %         [email or web address]/[email or web address]_performance_[email or web address]_[email or web address].pdf  PHYSICAL EXAM:  Vital Signs Last 24 Hrs  T(C): 36.4 (05 Feb 2020 08:22), Max: 36.4 (05 Feb 2020 08:22)  T(F): 97.6 (05 Feb 2020 08:22), Max: 97.6 (05 Feb 2020 08:22)  HR: 62 (05 Feb 2020 08:22) (62 - 84)  BP: 139/77 (05 Feb 2020 08:22) (122/68 - 139/77)  BP(mean): --  RR: 18 (05 Feb 2020 08:22) (18 - 18)  SpO2: 99% (05 Feb 2020 08:22) (99% - 99%) I&O's Summary    04 Feb 2020 07:01  -  05 Feb 2020 07:00  --------------------------------------------------------  IN: 0 mL / OUT: 150 mL / NET: -150 mL    GENERAL:  [ ]Alert  [ ]Oriented x   [ ]Lethargic  [ ]Cachexia  [ ]Unarousable  [ ]Verbal  [ ]Non-Verbal  Behavioral:   [ ] Anxiety  [ ] Delirium [ ] Agitation [ ] Other  HEENT:  [ ]Normal   [ ]Dry mouth   [ ]ET Tube/Trach  [ ]Oral lesions  PULMONARY:   [ ]Clear [ ]Tachypnea  [ ]Audible excessive secretions   [ ]Rhonchi        [ ]Right [ ]Left [ ]Bilateral  [ ]Crackles        [ ]Right [ ]Left [ ]Bilateral  [ ]Wheezing     [ ]Right [ ]Left [ ]Bilateral  [ ]Diminshed BS [ ]Right [ ]Left [ ]Bilateral    CARDIOVASCULAR:    [ ]Regular [ ]Irregular [ ]Tachy  [ ]Hung [ ]Murmur [ ]Other  GASTROINTESTINAL:  [ ]Soft  [ ]Distended   [ ]+BS  [ ]Non tender [ ]Tender  [ ]PEG [ ]OGT/ NGT   Last BM:       GENITOURINARY:  [ ]Normal [ ] Incontinent   [ ]Oliguria/Anuria   [ ]Siddiqi  MUSCULOSKELETAL:   [ ]Normal   [ ]Weakness  [ ]Bed/Wheelchair bound [ ]Edema  NEUROLOGIC:   [ ]No focal deficits  [ ] Cognitive impairment  [ ] Dysphagia [ ]Dysarthria [ ] Paresis [ ]Other   SKIN:   [ ]Normal  [ ]Rash     [ ]Pressure ulcer(s)  [ ]y [ ]n  Present on admission      CRITICAL CARE:  [ ] Shock Present  [ ]Septic [ ]Cardiogenic [ ]Neurologic [ ]Hypovolemic  [ ]  Vasopressors [ ]  Inotropes   [ ] Respiratory failure present [ ] Mechanical Ventilation [ ] Non-invasive ventilatory support [ ] High-Flow  [ ] Acute  [ ] Chronic [ ] Hypoxic  [ ] Hypercarbic [ ] Other  [ ] Other organ failure     LABS:    PROTEIN CALORIE MALNUTRITION:     https://www.andeal.org/vault/2440/web/files/ONC/Table_Clinical%20Characteristics%20to%20Document%20Malnutrition-White%20JV%20et%20al%287277.pdf    Height (cm): 152.4 (01-23-20 @ 13:10)  Weight (kg): 74 (01-23-20 @ 13:10)  BMI (kg/m2): 31.9 (01-23-20 @ 13:10)    [x] PPSV2 < or = 30% [ ] significant weight loss [ ] poor nutritional intake [ ] anasarca Prealbumin, Serum: 14 mg/dL (01-30-20 @ 09:34)    Artificial Nutrition [ ]     REFERRALS:   [ ]Chaplaincy  [ ] Hospice  [ ]Child Life  [ ]Social Work  [ ]Case management [ ]Holistic Therapy [ ] Physical Therapy [ ] Dietary   Goals of Care Document: GAP TEAM PALLIATIVE CARE UNIT PROGRESS NOTE:      [  ] Patient on hospice program.    INDICATION FOR PALLIATIVE CARE UNIT SERVICES:  Metabolic encephalopathy in the setting of acute CVA in a patient with advanced dementia, PCU admission for symptom management (agitation and dyspnea), GOC ongoing.     INTERVAL HPI / OVERNIGHT EVENTS:  Required two doses of prn ativan overnight.   Seems lethargic today     DNR on chart: Yes    Allergies  Allergy Status Unknown  Intolerances    MEDICATIONS  (STANDING):  ARIPiprazole 2 milliGRAM(s) Oral daily  aspirin Suppository 300 milliGRAM(s) Rectal daily  atorvastatin 80 milliGRAM(s) Oral at bedtime  enoxaparin Injectable 30 milliGRAM(s) SubCutaneous daily  ergocalciferol 57064 Unit(s) Oral every week  hydrALAZINE Injectable 10 milliGRAM(s) IV Push every 6 hours  levothyroxine Injectable 62.5 MICROGram(s) IV Push at bedtime  nitroglycerin    2% Ointment 0.5 Inch(s) Transdermal daily  valproate sodium IVPB 500 milliGRAM(s) IV Intermittent two times a day    MEDICATIONS  (PRN):  acetaminophen  Suppository .. 650 milliGRAM(s) Rectal every 6 hours PRN Mild Pain (1 - 3)  glycopyrrolate Injectable 0.4 milliGRAM(s) IV Push every 4 hours PRN oropharyngeal secretions.  levalbuterol Inhalation 0.63 milliGRAM(s) Inhalation every 6 hours PRN Shortness of breath/Wheezing  LORazepam   Injectable 0.5 milliGRAM(s) IV Push every 1 hour PRN Agitation/Anxiety  morphine  - Injectable 0.5 milliGRAM(s) IV Push every 1 hour PRN Pain  morphine  - Injectable 0.5 milliGRAM(s) IV Push every 1 hour PRN Dyspnea    ITEMS UNCHECKED ARE NOT PRESENT    PRESENT SYMPTOMS: [x]Unable to obtain due to poor mentation   Source if other than patient:  [ ]Family   [x]Team     Pain: [ ] yes [x] no  QOL impact -   Location -                    Aggravating factors -  Quality -  Radiation -  Timing-  Severity (0-10 scale):  Minimal acceptable level (0-10 scale):     Dyspnea:                           [ ]Mild [ ]Moderate [ ]Severe  Anxiety:                             [ ]Mild [ ]Moderate [ ]Severe  Fatigue:                             [ ]Mild [ ]Moderate [ ]Severe  Nausea:                            [ ]Mild [ ]Moderate [ ]Severe  Loss of appetite:               [ ]Mild [x] Moderate [ ]Severe  Constipation:                     [ ]Mild [ ]Moderate [ ]Severe    PAINAD Score: 1    [email or web address]/cog/painad.[email or web address] (Ctrl +  left click to view)  		  Other Symptoms:  [x]All other review of systems negative     Palliative Performance Status Version 2:  20%         [email or web address]/files/[email or web address]_performance_[email or web address]_[email or web address]    PHYSICAL EXAM:  Vital Signs Last 24 Hrs  T(C): 36.4 (05 Feb 2020 08:22), Max: 36.4 (05 Feb 2020 08:22)  T(F): 97.6 (05 Feb 2020 08:22), Max: 97.6 (05 Feb 2020 08:22)  HR: 62 (05 Feb 2020 08:22) (62 - 84)  BP: 139/77 (05 Feb 2020 08:22) (122/68 - 139/77)  RR: 18 (05 Feb 2020 08:22) (18 - 18)  SpO2: 99% (05 Feb 2020 08:22) (99% - 99%) I&O's Summary    04 Feb 2020 07:01  -  05 Feb 2020 07:00  --------------------------------------------------------  IN: 0 mL / OUT: 150 mL / NET: -150 mL    GENERAL:  [ ]Alert  [ ]Oriented x   [x]Lethargic  [x]Cachexia  [ ]Unarousable  [ ]Verbal  [ ]Non-Verbal  Behavioral:   [ ] Anxiety  [x] Delirium [x] Agitation [ ] Other  HEENT:  [ ]Normal   [x]Dry mouth   [ ]ET Tube/Trach  [ ]Oral lesions  PULMONARY:   [x]Clear [ ]Tachypnea  [ ]Audible excessive secretions   [ ]Rhonchi        [ ]Right [ ]Left [ ]Bilateral  [ ]Crackles        [ ]Right [ ]Left [ ]Bilateral  [ ]Wheezing     [ ]Right [ ]Left [ ]Bilateral  [ ]Diminshed BS [ ]Right [ ]Left [ ]Bilateral    CARDIOVASCULAR:    [x]Regular [ ]Irregular [ ]Tachy  [ ]Hung [ ]Murmur [ ]Other  GASTROINTESTINAL:  [x]Soft  [ ]Distended   [x]+BS  [x]Non tender [ ]Tender  [ ]PEG [ ]OGT/ NGT   Last BM: Today    GENITOURINARY:  [ ]Normal [ ] Incontinent   [ ]Oliguria/Anuria   [x]Siddiqi  MUSCULOSKELETAL:   [ ]Normal   [x]Weakness  [ ]Bed/Wheelchair bound [ ]Edema  NEUROLOGIC:   [ ]No focal deficits  [x] Cognitive impairment  [ ] Dysphagia [ ]Dysarthria [ ] Paresis [ ]Other   SKIN:   [x]Normal  [ ]Rash     [ ]Pressure ulcer(s)  [ ]y [ ]n  Present on admission      CRITICAL CARE:  [ ] Shock Present  [ ]Septic [ ]Cardiogenic [ ]Neurologic [ ]Hypovolemic  [ ]  Vasopressors [ ]  Inotropes   [ ] Respiratory failure present [ ] Mechanical Ventilation [ ] Non-invasive ventilatory support [ ] High-Flow  [ ] Acute  [ ] Chronic [ ] Hypoxic  [ ] Hypercarbic [ ] Other  [ ] Other organ failure     PROTEIN CALORIE MALNUTRITION:     https://www.andeal.org/vault/2440/web/files/ONC/Table_Clinical%20Characteristics%20to%20Document%20Malnutrition-White%20JV%20et%20al%872141.pdf    Height (cm): 152.4 (01-23-20 @ 13:10)  Weight (kg): 74 (01-23-20 @ 13:10)  BMI (kg/m2): 31.9 (01-23-20 @ 13:10)    [x] PPSV2 < or = 30% [ ] significant weight loss [ ] poor nutritional intake [ ] anasarca Prealbumin, Serum: 14 mg/dL (01-30-20 @ 09:34)    Artificial Nutrition [ ]     REFERRALS:   [ ]Chaplaincy  [ ] Hospice  [ ]Child Life  [ ]Social Work  [ ]Case management [ ]Holistic Therapy [ ] Physical Therapy [ ] Dietary   Goals of Care Document: GAP TEAM PALLIATIVE CARE UNIT PROGRESS NOTE:      [  ] Patient on hospice program.    INDICATION FOR PALLIATIVE CARE UNIT SERVICES:  Metabolic encephalopathy in the setting of acute CVA in a patient with advanced dementia, PCU admission for symptom management (agitation and dyspnea), GOC ongoing.     INTERVAL HPI / OVERNIGHT EVENTS:  Required two doses of prn ativan overnight.   Seems lethargic today     DNR on chart: Yes    Allergies  Allergy Status Unknown  Intolerances    MEDICATIONS  (STANDING):  ARIPiprazole 2 milliGRAM(s) Oral daily  aspirin Suppository 300 milliGRAM(s) Rectal daily  atorvastatin 80 milliGRAM(s) Oral at bedtime  enoxaparin Injectable 30 milliGRAM(s) SubCutaneous daily  ergocalciferol 13476 Unit(s) Oral every week  hydrALAZINE Injectable 10 milliGRAM(s) IV Push every 6 hours  levothyroxine Injectable 62.5 MICROGram(s) IV Push at bedtime  nitroglycerin    2% Ointment 0.5 Inch(s) Transdermal daily  valproate sodium IVPB 500 milliGRAM(s) IV Intermittent two times a day    MEDICATIONS  (PRN):  acetaminophen  Suppository .. 650 milliGRAM(s) Rectal every 6 hours PRN Mild Pain (1 - 3)  glycopyrrolate Injectable 0.4 milliGRAM(s) IV Push every 4 hours PRN oropharyngeal secretions.  levalbuterol Inhalation 0.63 milliGRAM(s) Inhalation every 6 hours PRN Shortness of breath/Wheezing  LORazepam   Injectable 0.5 milliGRAM(s) IV Push every 1 hour PRN Agitation/Anxiety  morphine  - Injectable 0.5 milliGRAM(s) IV Push every 1 hour PRN Pain  morphine  - Injectable 0.5 milliGRAM(s) IV Push every 1 hour PRN Dyspnea    ITEMS UNCHECKED ARE NOT PRESENT    PRESENT SYMPTOMS: [x]Unable to obtain due to poor mentation   Source if other than patient:  [ ]Family   [x]Team     Pain: [ ] yes [x] no  QOL impact -   Location -                    Aggravating factors -  Quality -  Radiation -  Timing-  Severity (0-10 scale):  Minimal acceptable level (0-10 scale):     Dyspnea:                           [ ]Mild [ ]Moderate [ ]Severe  Anxiety:                             [ ]Mild [ ]Moderate [ ]Severe  Fatigue:                             [ ]Mild [ ]Moderate [ ]Severe  Nausea:                            [ ]Mild [ ]Moderate [ ]Severe  Loss of appetite:               [ ]Mild [x] Moderate [ ]Severe  Constipation:                     [ ]Mild [ ]Moderate [ ]Severe    PAINAD Score: 1    [email or web address].[email or web address]/cog/painad.[email or web address] (Ctrl +  left click to view)  		  Other Symptoms:  [x]All other review of systems negative     Palliative Performance Status Version 2:  20%         [email or web address]/news/[email or web address]_performance_scale_[email or web address].pdf    PHYSICAL EXAM:  Vital Signs Last 24 Hrs  T(C): 36.4 (05 Feb 2020 08:22), Max: 36.4 (05 Feb 2020 08:22)  T(F): 97.6 (05 Feb 2020 08:22), Max: 97.6 (05 Feb 2020 08:22)  HR: 62 (05 Feb 2020 08:22) (62 - 84)  BP: 139/77 (05 Feb 2020 08:22) (122/68 - 139/77)  RR: 18 (05 Feb 2020 08:22) (18 - 18)  SpO2: 99% (05 Feb 2020 08:22) (99% - 99%) I&O's Summary    04 Feb 2020 07:01  -  05 Feb 2020 07:00  --------------------------------------------------------  IN: 0 mL / OUT: 150 mL / NET: -150 mL    GENERAL:  [ ]Alert  [ ]Oriented x   [x]Lethargic  [x]Cachexia  [ ]Unarousable  [x ]Verbal minimal  [ ]Non-Verbal  Behavioral:   [ ] Anxiety  [x] Delirium [x] Agitation [ ] Other  HEENT:  [ ]Normal   [x]Dry mouth   [ ]ET Tube/Trach  [ ]Oral lesions  PULMONARY:   [x]Clear [ ]Tachypnea  [ ]Audible excessive secretions   [ ]Rhonchi        [ ]Right [ ]Left [ ]Bilateral  [ ]Crackles        [ ]Right [ ]Left [ ]Bilateral  [ ]Wheezing     [ ]Right [ ]Left [ ]Bilateral  [ ]Diminshed BS [ ]Right [ ]Left [ ]Bilateral    CARDIOVASCULAR:    [x]Regular [ ]Irregular [ ]Tachy  [ ]Hung [ ]Murmur [ ]Other  GASTROINTESTINAL:  [x]Soft  [ ]Distended   [x]+BS  [x]Non tender [ ]Tender  [ ]PEG [ ]OGT/ NGT   Last BM: Today    GENITOURINARY:  [ ]Normal x[ ] Incontinent   [ ]Oliguria/Anuria   [x]Siddiqi for skin protection/comfort  MUSCULOSKELETAL:   [ ]Normal   [x]Weakness  [ ]Bed/Wheelchair bound [ ]Edema  NEUROLOGIC:   [ ]No focal deficits  [x] Cognitive impairment  [x ] Dysphagia [ ]Dysarthria [ ] Paresis [ ]Other   SKIN:   [x]Normal  [ ]Rash     [ ]Pressure ulcer(s)  [ ]y [ ]n  Present on admission      CRITICAL CARE:  [ ] Shock Present  [ ]Septic [ ]Cardiogenic [ ]Neurologic [ ]Hypovolemic  [ ]  Vasopressors [ ]  Inotropes   [ ] Respiratory failure present [ ] Mechanical Ventilation [ ] Non-invasive ventilatory support [ ] High-Flow  [ ] Acute  [ ] Chronic [ ] Hypoxic  [ ] Hypercarbic [ ] Other  [x ] Other organ failure  brain    PROTEIN CALORIE MALNUTRITION:     https://www.andeal.org/vault/2440/web/files/ONC/Table_Clinical%20Characteristics%20to%20Document%20Malnutrition-White%20JV%20et%20al%408034.pdf    Height (cm): 152.4 (01-23-20 @ 13:10)  Weight (kg): 74 (01-23-20 @ 13:10)  BMI (kg/m2): 31.9 (01-23-20 @ 13:10)    [x] PPSV2 < or = 30% [ ] significant weight loss [x ] poor nutritional intake [ ] anasarca Prealbumin, Serum: 14 mg/dL (01-30-20 @ 09:34)    Artificial Nutrition [ ]     REFERRALS:   [ ]Chaplaincy  [ ] Hospice  [ ]Child Life  [ ]Social Work  [ ]Case management [ ]Holistic Therapy [ ] Physical Therapy [ ] Dietary   Goals of Care Document:

## 2020-02-05 NOTE — CHART NOTE - NSCHARTNOTEFT_GEN_A_CORE
Nutrition Follow Up Note  Patient seen for: Nutrition follow up. Pt is a 89 year old female with metabolic encephalopathy due to acute CVA, advanced dementia, progressively worsening dysphagia S/P swallow evaluations 1/29-recommended NPO, family does not wish to pursue enteral nutrition at this time, initiated on pleasure feeds 1/31. Pt transferred to PCU.     Source: RN, PCA-Pt with advanced dementia, unable to respond to questions, inappropriate to speak with family at this time    Diet : Dysphagia 1 diet with honey consistency liquids     Patient reports: Per PCA pt has been unable to swallow foods or liquids, pt will open her mouth to accept foods however will just hold food/drink in her mouth. RD observed attempt to feed yogurt and honey consistency liquid-needed to be wiped out of mouth. family providing mouth swabs now. No reported N+V, last BM noted 2/4.       Weight: no new wt    Pertinent Medications: MEDICATIONS  (STANDING):  ARIPiprazole 2 milliGRAM(s) Oral daily  aspirin Suppository 300 milliGRAM(s) Rectal daily  atorvastatin 80 milliGRAM(s) Oral at bedtime  enoxaparin Injectable 30 milliGRAM(s) SubCutaneous daily  ergocalciferol 73294 Unit(s) Oral every week  hydrALAZINE Injectable 10 milliGRAM(s) IV Push every 6 hours  levothyroxine Injectable 62.5 MICROGram(s) IV Push at bedtime  nitroglycerin    2% Ointment 0.5 Inch(s) Transdermal daily  valproate sodium IVPB 500 milliGRAM(s) IV Intermittent two times a day    MEDICATIONS  (PRN):  acetaminophen  Suppository .. 650 milliGRAM(s) Rectal every 6 hours PRN Mild Pain (1 - 3)  glycopyrrolate Injectable 0.4 milliGRAM(s) IV Push every 4 hours PRN oropharyngeal secretions.  levalbuterol Inhalation 0.63 milliGRAM(s) Inhalation every 6 hours PRN Shortness of breath/Wheezing  LORazepam   Injectable 0.5 milliGRAM(s) IV Push every 1 hour PRN Agitation/Anxiety  morphine  - Injectable 0.5 milliGRAM(s) IV Push every 1 hour PRN Pain  morphine  - Injectable 0.5 milliGRAM(s) IV Push every 1 hour PRN Dyspnea    Pertinent Labs: done since 2/3  Finger Sticks: none      Skin per nursing documentation: no pressure injury   Edema: 2+ generalized edema    Estimated Needs:   [ x] no change since previous assessment  [ ] recalculated:     Previous Nutrition Diagnosis: Swallowing difficulty  Nutrition Diagnosis is: ongoing, pt receiving pleasure feeds    New Nutrition Diagnosis:  Related to:    As evidenced by:      Interventions:     Recommend  1) Diet texture deferred to medical team-currently pleasure feeds dysphagia 1 diet with honey consistency liquids  2) Obtain/honor food preferences as requested    Monitoring and Evaluation:     Continue to monitor Nutritional intake, Tolerance to diet prescription, weights, labs, skin integrity    RD remains available upon request and will follow up per protocol Nutrition Follow Up Note  Patient seen for: Nutrition follow up. Pt is a 89 year old female with metabolic encephalopathy due to acute CVA, advanced dementia, progressively worsening dysphagia S/P swallow evaluations 1/29-recommended NPO, family does not wish to pursue enteral nutrition at this time, initiated on pleasure feeds 1/31. Pt transferred to PCU.     Source: RN, PCA-Pt with advanced dementia, unable to respond to questions, inappropriate to speak with family at this time    Diet : Dysphagia 1 diet with honey consistency liquids     Patient reports: Per PCA pt has been unable to swallow foods or liquids, pt will open her mouth to accept foods however will just hold food/drink in her mouth. RD observed attempt to feed yogurt and honey consistency liquid-needed to be wiped out of mouth. family providing mouth swabs now. No reported N+V, last BM noted 2/4. Pt previously NPO x ~9 days, advanced to pleasure feeds 1/31      Weight: no new wt    Pertinent Medications: MEDICATIONS  (STANDING):  ARIPiprazole 2 milliGRAM(s) Oral daily  aspirin Suppository 300 milliGRAM(s) Rectal daily  atorvastatin 80 milliGRAM(s) Oral at bedtime  enoxaparin Injectable 30 milliGRAM(s) SubCutaneous daily  ergocalciferol 90161 Unit(s) Oral every week  hydrALAZINE Injectable 10 milliGRAM(s) IV Push every 6 hours  levothyroxine Injectable 62.5 MICROGram(s) IV Push at bedtime  nitroglycerin    2% Ointment 0.5 Inch(s) Transdermal daily  valproate sodium IVPB 500 milliGRAM(s) IV Intermittent two times a day    MEDICATIONS  (PRN):  acetaminophen  Suppository .. 650 milliGRAM(s) Rectal every 6 hours PRN Mild Pain (1 - 3)  glycopyrrolate Injectable 0.4 milliGRAM(s) IV Push every 4 hours PRN oropharyngeal secretions.  levalbuterol Inhalation 0.63 milliGRAM(s) Inhalation every 6 hours PRN Shortness of breath/Wheezing  LORazepam   Injectable 0.5 milliGRAM(s) IV Push every 1 hour PRN Agitation/Anxiety  morphine  - Injectable 0.5 milliGRAM(s) IV Push every 1 hour PRN Pain  morphine  - Injectable 0.5 milliGRAM(s) IV Push every 1 hour PRN Dyspnea    Pertinent Labs: done since 2/3  Finger Sticks: none      Skin per nursing documentation: no pressure injury   Edema: 2+ generalized edema    Estimated Needs:   [ x] no change since previous assessment  [ ] recalculated:     Previous Nutrition Diagnosis: Swallowing difficulty  Nutrition Diagnosis is: ongoing, pt receiving pleasure feeds    New Nutrition Diagnosis: Mild malnutrition  Related to: swallowing difficulty    As evidenced by: Pt NPO for >7 days, meeting <50% estimated needs     Interventions:     Recommend  1) Diet texture deferred to medical team-currently pleasure feeds dysphagia 1 diet with honey consistency liquids  2) Obtain/honor food preferences as requested  3) Malnutrition alert placed in chart     Monitoring and Evaluation:     Continue to monitor Nutritional intake, Tolerance to diet prescription, weights, labs, skin integrity    RD remains available upon request and will follow up per protocol Nutrition Follow Up Note  Patient seen for: Nutrition follow up. Pt is a 89 year old female with metabolic encephalopathy due to acute CVA, advanced dementia, progressively worsening dysphagia S/P swallow evaluations 1/29-recommended NPO, family does not wish to pursue enteral nutrition at this time, initiated on pleasure feeds 1/31. Pt transferred to PCU. Per discussion with MD possible plan for follow up swallow evaluation pending pt's mental status and level of alertness per family request.     Source: RN, PCA-Pt with advanced dementia, unable to respond to questions, inappropriate to speak with family at this time    Diet : Dysphagia 1 diet with honey consistency liquids     Patient reports: Per PCA pt has been unable to swallow foods or liquids, pt will open her mouth to accept foods however will just hold food/drink in her mouth. RD observed attempt to feed yogurt and honey consistency liquid-needed to be wiped out of mouth. family providing mouth swabs now. No reported N+V, last BM noted 2/4. Pt previously NPO x ~9 days, advanced to pleasure feeds 1/31      Weight: no new wt    Pertinent Medications: MEDICATIONS  (STANDING):  ARIPiprazole 2 milliGRAM(s) Oral daily  aspirin Suppository 300 milliGRAM(s) Rectal daily  atorvastatin 80 milliGRAM(s) Oral at bedtime  enoxaparin Injectable 30 milliGRAM(s) SubCutaneous daily  ergocalciferol 99438 Unit(s) Oral every week  hydrALAZINE Injectable 10 milliGRAM(s) IV Push every 6 hours  levothyroxine Injectable 62.5 MICROGram(s) IV Push at bedtime  nitroglycerin    2% Ointment 0.5 Inch(s) Transdermal daily  valproate sodium IVPB 500 milliGRAM(s) IV Intermittent two times a day    MEDICATIONS  (PRN):  acetaminophen  Suppository .. 650 milliGRAM(s) Rectal every 6 hours PRN Mild Pain (1 - 3)  glycopyrrolate Injectable 0.4 milliGRAM(s) IV Push every 4 hours PRN oropharyngeal secretions.  levalbuterol Inhalation 0.63 milliGRAM(s) Inhalation every 6 hours PRN Shortness of breath/Wheezing  LORazepam   Injectable 0.5 milliGRAM(s) IV Push every 1 hour PRN Agitation/Anxiety  morphine  - Injectable 0.5 milliGRAM(s) IV Push every 1 hour PRN Pain  morphine  - Injectable 0.5 milliGRAM(s) IV Push every 1 hour PRN Dyspnea    Pertinent Labs: done since 2/3  Finger Sticks: none      Skin per nursing documentation: no pressure injury   Edema: 2+ generalized edema    Estimated Needs:   [ x] no change since previous assessment  [ ] recalculated:     Previous Nutrition Diagnosis: Swallowing difficulty  Nutrition Diagnosis is: ongoing, pt receiving pleasure feeds    New Nutrition Diagnosis: Mild malnutrition  Related to: swallowing difficulty    As evidenced by: Pt NPO for >7 days, meeting <50% estimated needs     Interventions:     Recommend  1) Diet texture deferred to medical team-currently pleasure feeds dysphagia 1 diet with honey consistency liquids  2) Obtain/honor food preferences as requested  3) Malnutrition alert placed in chart     Monitoring and Evaluation:     Continue to monitor Nutritional intake, Tolerance to diet prescription, weights, labs, skin integrity    RD remains available upon request and will follow up per protocol Nutrition Follow Up Note  Patient seen for: Nutrition follow up. Pt is a 89 year old female with metabolic encephalopathy due to acute CVA, advanced dementia, progressively worsening dysphagia S/P swallow evaluations 1/29-recommended NPO, family does not wish to pursue enteral nutrition at this time, initiated on pleasure feeds 1/31. Pt transferred to PCU. Per discussion with MD possible plan for follow up swallow evaluation pending pt's mental status and level of alertness per family request.     Source: RN, PCA-Pt with advanced dementia, unable to respond to questions, inappropriate to speak with family at this time    Diet : Dysphagia 1 diet with honey consistency liquids     Patient reports: Per PCA pt has been unable to swallow foods or liquids, pt will open her mouth to accept foods however will just hold food/drink in her mouth. RD observed attempt to feed yogurt and honey consistency liquid-needed to be wiped out of mouth. family providing mouth swabs now. No reported N+V, last BM noted 2/4. Pt previously NPO x ~9 days, advanced to pleasure feeds 1/31      Weight: no new wt    Pertinent Medications: MEDICATIONS  (STANDING):  ARIPiprazole 2 milliGRAM(s) Oral daily  aspirin Suppository 300 milliGRAM(s) Rectal daily  atorvastatin 80 milliGRAM(s) Oral at bedtime  enoxaparin Injectable 30 milliGRAM(s) SubCutaneous daily  ergocalciferol 71883 Unit(s) Oral every week  hydrALAZINE Injectable 10 milliGRAM(s) IV Push every 6 hours  levothyroxine Injectable 62.5 MICROGram(s) IV Push at bedtime  nitroglycerin    2% Ointment 0.5 Inch(s) Transdermal daily  valproate sodium IVPB 500 milliGRAM(s) IV Intermittent two times a day    MEDICATIONS  (PRN):  acetaminophen  Suppository .. 650 milliGRAM(s) Rectal every 6 hours PRN Mild Pain (1 - 3)  glycopyrrolate Injectable 0.4 milliGRAM(s) IV Push every 4 hours PRN oropharyngeal secretions.  levalbuterol Inhalation 0.63 milliGRAM(s) Inhalation every 6 hours PRN Shortness of breath/Wheezing  LORazepam   Injectable 0.5 milliGRAM(s) IV Push every 1 hour PRN Agitation/Anxiety  morphine  - Injectable 0.5 milliGRAM(s) IV Push every 1 hour PRN Pain  morphine  - Injectable 0.5 milliGRAM(s) IV Push every 1 hour PRN Dyspnea    Pertinent Labs: done since 2/3  Finger Sticks: none      Skin per nursing documentation: no pressure injury   Edema: 2+ generalized edema    Estimated Needs:   [ x] no change since previous assessment  [ ] recalculated:     Previous Nutrition Diagnosis: Swallowing difficulty  Nutrition Diagnosis is: ongoing, pt receiving pleasure feeds    New Nutrition Diagnosis: Mild malnutrition  Related to: swallowing difficulty    As evidenced by: meeting <50% estimated needs for >7 days     Interventions:     Recommend  1) Diet texture deferred to medical team-currently pleasure feeds dysphagia 1 diet with honey consistency liquids  2) Obtain/honor food preferences as requested  3) Malnutrition alert placed in chart     Monitoring and Evaluation:     Continue to monitor Nutritional intake, Tolerance to diet prescription, weights, labs, skin integrity    RD remains available upon request and will follow up per protocol

## 2020-02-05 NOTE — PROGRESS NOTE ADULT - ATTENDING COMMENTS
I have personally seen and examined this patient and agree with the above assessment and plan, which I have reviewed and edited where appropriate.     GOC: Symptom directed care in the setting of advanced dementia and CVA  Symptoms: Delirium   Disposition: Uncontrolled delirium requiring medication protocol for patient comfort and safety.  Hospice referral.    Family aware of plan.  Support offered again over distress regarding nutrition.

## 2020-02-05 NOTE — PROGRESS NOTE ADULT - PROBLEM SELECTOR PLAN 3
Progressive Dysphagia this hospital course likely exacerbated by AMS and acute CVA, failed speech and swallow eval 2/3.   -GOC: HCP does not want PEG, which are aligned with patient's goals  -Will observe patient's alertness this week and consider repeating s/s eval tomorrow

## 2020-02-06 NOTE — PROGRESS NOTE ADULT - PROBLEM SELECTOR PLAN 6
GOC: DNR, DNI, no PEG  Dispo: Unclear as family would like to keep patient in PCU until demise, however understand that this is not a long term placement. Family cannot care for patient at home. GOC conversations ongoing Continued GOC discussions and DC planning with family.  Remains with significant delirium

## 2020-02-06 NOTE — PROGRESS NOTE ADULT - PROBLEM SELECTOR PLAN 1
Likely related to acute CVA, and prior history of advanced dementia, prior CVAs   - Consciousness and alertness is waxing and waning  - Depacon 500 mg BID for agitation  - Required 3 doses of prn for agitation   - Ativan 0.5mg Q1hr prn for agitation  - Consider haldol 0.5mg prn IV Q6hr prn for agitation Likely related to acute CVA, and prior history of advanced dementia, prior CVAs   - Consciousness and alertness is waxing and waning  - Required 3 doses of ativan prn for agitation with poor effect,   - responded to Haldol

## 2020-02-06 NOTE — PROGRESS NOTE ADULT - SUBJECTIVE AND OBJECTIVE BOX
GAP TEAM PALLIATIVE CARE UNIT PROGRESS NOTE:      [  ] Patient on hospice program.    INDICATION FOR PALLIATIVE CARE UNIT SERVICES:  Metabolic encephalopathy in the setting of acute CVA in a patient with advanced dementia, PCU admission for symptom management (agitation and dyspnea), GOC ongoing.     INTERVAL HPI/OVERNIGHT EVENTS:  Family and nursing staff attempted to feed patient yesterday without success, patient would not swallow and food would pool in her mouth which eventually had to be suctioned.     DNR on chart: Yes    Allergies  Allergy Status Unknown  Intolerances    MEDICATIONS  (STANDING):  hydrALAZINE Injectable 10 milliGRAM(s) IV Push every 6 hours  levothyroxine Injectable 62.5 MICROGram(s) IV Push at bedtime  nitroglycerin    2% Ointment 0.5 Inch(s) Transdermal daily  valproate sodium IVPB 500 milliGRAM(s) IV Intermittent two times a day    MEDICATIONS  (PRN):  acetaminophen  Suppository .. 650 milliGRAM(s) Rectal every 6 hours PRN Mild Pain (1 - 3)  glycopyrrolate Injectable 0.4 milliGRAM(s) IV Push every 4 hours PRN oropharyngeal secretions.  LORazepam   Injectable 0.5 milliGRAM(s) IV Push every 1 hour PRN Agitation/Anxiety  morphine  - Injectable 0.5 milliGRAM(s) IV Push every 1 hour PRN Pain  morphine  - Injectable 0.5 milliGRAM(s) IV Push every 1 hour PRN Dyspnea    ITEMS UNCHECKED ARE NOT PRESENT    PRESENT SYMPTOMS: [x]Unable to obtain due to poor mentation   Source if other than patient:  [ ]Family   [x]Team     Pain: [ ] yes [ ] no  QOL impact -   Location -                    Aggravating factors -  Quality -  Radiation -  Timing-  Severity (0-10 scale):  Minimal acceptable level (0-10 scale):     Dyspnea:                           [ ]Mild [ ]Moderate [ ]Severe  Anxiety:                             [ ]Mild [ ]Moderate [ ]Severe  Fatigue:                             [ ]Mild [ ]Moderate [ ]Severe  Nausea:                             [ ]Mild [ ]Moderate [ ]Severe  Loss of appetite:              [ ]Mild [ ]Moderate [ ]Severe  Constipation:                    [ ]Mild [ ]Moderate [ ]Severe    PAINAD Score: 2    [email or web address].[place].[facility]/cog/painad.pdf (Ctrl +  left click to view)  		  Other Symptoms: Agitation   [x]All other review of systems negative     Palliative Performance Status Version 2:   20%         http://npcrc.org/files/news/palliative_performance_scale_[email or web address].pdf    PHYSICAL EXAM:  Vital Signs Last 24 Hrs  T(C): 34.7 (06 Feb 2020 08:29), Max: 36.4 (06 Feb 2020 05:15)  T(F): 94.5 (06 Feb 2020 08:29), Max: 97.5 (06 Feb 2020 05:15)  HR: 76 (06 Feb 2020 08:29) (73 - 84)  BP: 167/82 (06 Feb 2020 08:29) (76/41 - 167/82)  RR: 20 (06 Feb 2020 08:29) (18 - 20)  SpO2: 96% (06 Feb 2020 08:29) (96% - 98%) I&O's Summary    05 Feb 2020 07:01  -  06 Feb 2020 07:00  --------------------------------------------------------  IN: 0 mL / OUT: 400 mL / NET: -400 mL    GENERAL:  [ ]Alert  [ ]Oriented x   [x]Lethargic  [ ]Cachexia  [ ]Unarousable  [ ]Verbal  [ ]Non-Verbal  Behavioral:   [ ] Anxiety  [ ] Delirium [x] Agitation [ ] Other  HEENT:  [ ]Normal   [x]Dry mouth   [ ]ET Tube/Trach  [ ]Oral lesions  PULMONARY:   [x]Clear [ ]Tachypnea  [ ]Audible excessive secretions   [ ]Rhonchi        [ ]Right [ ]Left [ ]Bilateral  [ ]Crackles        [ ]Right [ ]Left [ ]Bilateral  [ ]Wheezing     [ ]Right [ ]Left [ ]Bilateral  [ ]Diminshed BS [ ]Right [ ]Left [ ]Bilateral    CARDIOVASCULAR:    [ ]Regular [x]Irregular [ ]Tachy  [ ]Hung [ ]Murmur [ ]Other  GASTROINTESTINAL:  [x]Soft  [ ]Distended   [x]+BS  [x]Non tender [ ]Tender  [ ]PEG [ ]OGT/ NGT   Last BM:       GENITOURINARY:  [ ]Normal [ ] Incontinent   [ ]Oliguria/Anuria   [ ]Siddiqi  MUSCULOSKELETAL:   [ ]Normal   [x]Weakness  [ ]Bed/Wheelchair bound [ ]Edema  NEUROLOGIC:   [ ]No focal deficits  [x] Cognitive impairment  [x] Dysphagia [x]Dysarthria [x] Paresis [ ]Other   SKIN:   [x]Normal  [ ]Rash     [ ]Pressure ulcer(s)  [ ]y [ ]n  Present on admission      CRITICAL CARE:  [ ] Shock Present  [ ]Septic [ ]Cardiogenic [ ]Neurologic [ ]Hypovolemic  [ ]  Vasopressors [ ]  Inotropes   [ ] Respiratory failure present [ ] Mechanical Ventilation [ ] Non-invasive ventilatory support [ ] High-Flow  [ ] Acute  [ ] Chronic [ ] Hypoxic  [ ] Hypercarbic [ ] Other  [ ] Other organ failure     LABS:  No blood draws   PROTEIN CALORIE MALNUTRITION:  https://www.andeal.org/vault/2440/web/files/ONC/Table_Clinical%20Characteristics%20to%20Document%20Malnutrition-White%20JV%20et%20al%069649.pdf    Height (cm): 152.4 (01-23-20 @ 13:10)  Weight (kg): 74 (01-23-20 @ 13:10)  BMI (kg/m2): 31.9 (01-23-20 @ 13:10)    [x] PPSV2 < or = 30% [ ] significant weight loss [ ] poor nutritional intake [ ] anasarca Prealbumin, Serum: 14 mg/dL (01-30-20 @ 09:34)    Artificial Nutrition [ ]     REFERRALS:   [ ]Chaplaincy  [ ] Hospice  [ ]Child Life  [ ]Social Work  [ ]Case management [ ]Holistic Therapy [ ] Physical Therapy [ ] Dietary   Goals of Care Document: GAP TEAM PALLIATIVE CARE UNIT PROGRESS NOTE:      [  ] Patient on hospice program.    INDICATION FOR PALLIATIVE CARE UNIT SERVICES:  Metabolic encephalopathy in the setting of acute CVA in a patient with advanced dementia, PCU admission for symptom management (agitation and dyspnea), GOC ongoing.     INTERVAL HPI/OVERNIGHT EVENTS:  Family and nursing staff attempted to feed patient yesterday without success, patient would not swallow and food would pool in her mouth which eventually had to be suctioned.   Agitated overnight and this morning requiring 3 prn ativan doses. Given Haldol with good effect.     DNR on chart: Yes    Allergies  Allergy Status Unknown  Intolerances    MEDICATIONS  (STANDING):  hydrALAZINE Injectable 10 milliGRAM(s) IV Push every 6 hours  levothyroxine Injectable 62.5 MICROGram(s) IV Push at bedtime  nitroglycerin    2% Ointment 0.5 Inch(s) Transdermal daily  valproate sodium IVPB 500 milliGRAM(s) IV Intermittent two times a day    MEDICATIONS  (PRN):  acetaminophen  Suppository .. 650 milliGRAM(s) Rectal every 6 hours PRN Mild Pain (1 - 3)  glycopyrrolate Injectable 0.4 milliGRAM(s) IV Push every 4 hours PRN oropharyngeal secretions.  LORazepam   Injectable 0.5 milliGRAM(s) IV Push every 1 hour PRN Agitation/Anxiety  morphine  - Injectable 0.5 milliGRAM(s) IV Push every 1 hour PRN Pain  morphine  - Injectable 0.5 milliGRAM(s) IV Push every 1 hour PRN Dyspnea    ITEMS UNCHECKED ARE NOT PRESENT    PRESENT SYMPTOMS: [x]Unable to obtain due to poor mentation   Source if other than patient:  [ ]Family   [x]Team     Pain: [ ] yes [x] no  QOL impact -   Location -                    Aggravating factors -  Quality -  Radiation -  Timing-  Severity (0-10 scale):  Minimal acceptable level (0-10 scale):     Dyspnea:                           [ ]Mild [ ]Moderate [ ]Severe  Anxiety:                             [ ]Mild [ ]Moderate [ ]Severe  Fatigue:                             [ ]Mild [ ]Moderate [ ]Severe  Nausea:                             [ ]Mild [ ]Moderate [ ]Severe  Loss of appetite:              [ ]Mild [x]Moderate [ ]Severe  Constipation:                    [ ]Mild [ ]Moderate [ ]Severe    PAINAD Score: 2    http://geriatrictoolkit.[place].[facility]/cog/painad.pdf (Ctrl +  left click to view)  		  Other Symptoms: Agitation   [x]All other review of systems negative     Palliative Performance Status Version 2:   20%         [email or web address]/files/news/palliative_performance_scale_[email or web address].pdf    PHYSICAL EXAM:  Vital Signs Last 24 Hrs  T(C): 34.7 (06 Feb 2020 08:29), Max: 36.4 (06 Feb 2020 05:15)  T(F): 94.5 (06 Feb 2020 08:29), Max: 97.5 (06 Feb 2020 05:15)  HR: 76 (06 Feb 2020 08:29) (73 - 84)  BP: 167/82 (06 Feb 2020 08:29) (76/41 - 167/82)  RR: 20 (06 Feb 2020 08:29) (18 - 20)  SpO2: 96% (06 Feb 2020 08:29) (96% - 98%) I&O's Summary    05 Feb 2020 07:01  -  06 Feb 2020 07:00  --------------------------------------------------------  IN: 0 mL / OUT: 400 mL / NET: -400 mL    GENERAL:  [ ]Alert  [ ]Oriented x   [x]Lethargic  [ ]Cachexia  [ ]Unarousable  [ ]Verbal  [ ]Non-Verbal  Behavioral:   [ ] Anxiety  [ ] Delirium [x] Agitation [ ] Other  HEENT:  [ ]Normal   [x]Dry mouth   [ ]ET Tube/Trach  [ ]Oral lesions  PULMONARY:   [x]Clear [ ]Tachypnea  [ ]Audible excessive secretions   [ ]Rhonchi        [ ]Right [ ]Left [ ]Bilateral  [ ]Crackles        [ ]Right [ ]Left [ ]Bilateral  [ ]Wheezing     [ ]Right [ ]Left [ ]Bilateral  [ ]Diminshed BS [ ]Right [ ]Left [ ]Bilateral    CARDIOVASCULAR:    [ ]Regular [x]Irregular [ ]Tachy  [ ]Hung [ ]Murmur [ ]Other  GASTROINTESTINAL:  [x]Soft  [ ]Distended   [x]+BS  [x]Non tender [ ]Tender  [ ]PEG [ ]OGT/ NGT   Last BM:       GENITOURINARY:  [ ]Normal [ ] Incontinent   [ ]Oliguria/Anuria   [ ]Siddiqi  MUSCULOSKELETAL:   [ ]Normal   [x]Weakness  [ ]Bed/Wheelchair bound [ ]Edema  NEUROLOGIC:   [ ]No focal deficits  [x] Cognitive impairment  [x] Dysphagia [x]Dysarthria [x] Paresis [ ]Other   SKIN:   [x]Normal  [ ]Rash     [ ]Pressure ulcer(s)  [ ]y [ ]n  Present on admission      CRITICAL CARE:  [ ] Shock Present  [ ]Septic [ ]Cardiogenic [ ]Neurologic [ ]Hypovolemic  [ ]  Vasopressors [ ]  Inotropes   [ ] Respiratory failure present [ ] Mechanical Ventilation [ ] Non-invasive ventilatory support [ ] High-Flow  [ ] Acute  [ ] Chronic [ ] Hypoxic  [ ] Hypercarbic [ ] Other  [ ] Other organ failure     LABS:  No blood draws   PROTEIN CALORIE MALNUTRITION:  https://www.andeal.org/vault/2440/web/files/ONC/Table_Clinical%20Characteristics%20to%20Document%20Malnutrition-White%20JV%20et%20al%899372.pdf    Height (cm): 152.4 (01-23-20 @ 13:10)  Weight (kg): 74 (01-23-20 @ 13:10)  BMI (kg/m2): 31.9 (01-23-20 @ 13:10)    [x] PPSV2 < or = 30% [ ] significant weight loss [ ] poor nutritional intake [ ] anasarca Prealbumin, Serum: 14 mg/dL (01-30-20 @ 09:34)    Artificial Nutrition [ ]     REFERRALS:   [ ]Chaplaincy  [ ] Hospice  [ ]Child Life  [ ]Social Work  [ ]Case management [ ]Holistic Therapy [ ] Physical Therapy [ ] Dietary   Goals of Care Document: GAP TEAM PALLIATIVE CARE UNIT PROGRESS NOTE:      [  ] Patient on hospice program.    INDICATION FOR PALLIATIVE CARE UNIT SERVICES:  Delirium in setting of acute CVA in a patient with advanced dementia, PCU admission for symptom management (agitation and dyspnea), GOC ongoing.     INTERVAL HPI/OVERNIGHT EVENTS:  Family and nursing staff attempted to feed patient yesterday without success, patient would not swallow and food would pool in her mouth which eventually had to be suctioned.   Agitated overnight and this morning requiring 3 prn ativan doses. Given Haldol with good effect.     DNR on chart: Yes    Allergies  Allergy Status Unknown  Intolerances    MEDICATIONS  (STANDING):  hydrALAZINE Injectable 10 milliGRAM(s) IV Push every 6 hours  levothyroxine Injectable 62.5 MICROGram(s) IV Push at bedtime  nitroglycerin    2% Ointment 0.5 Inch(s) Transdermal daily  valproate sodium IVPB 500 milliGRAM(s) IV Intermittent two times a day    MEDICATIONS  (PRN):  acetaminophen  Suppository .. 650 milliGRAM(s) Rectal every 6 hours PRN Mild Pain (1 - 3)  glycopyrrolate Injectable 0.4 milliGRAM(s) IV Push every 4 hours PRN oropharyngeal secretions.  LORazepam   Injectable 0.5 milliGRAM(s) IV Push every 1 hour PRN Agitation/Anxiety  morphine  - Injectable 0.5 milliGRAM(s) IV Push every 1 hour PRN Pain  morphine  - Injectable 0.5 milliGRAM(s) IV Push every 1 hour PRN Dyspnea    ITEMS UNCHECKED ARE NOT PRESENT    PRESENT SYMPTOMS: [x]Unable to obtain due to poor mentation   Source if other than patient:  [ ]Family   [x]Team     Pain: [ ] yes [x] no  QOL impact -   Location -                    Aggravating factors -  Quality -  Radiation -  Timing-  Severity (0-10 scale):  Minimal acceptable level (0-10 scale):     Dyspnea:                           [ ]Mild [ ]Moderate [ ]Severe  Anxiety:                             [ ]Mild [ ]Moderate [ ]Severe  Fatigue:                             [ ]Mild [ ]Moderate [ ]Severe  Nausea:                             [ ]Mild [ ]Moderate [ ]Severe  Loss of appetite:              [ ]Mild [x]Moderate [ ]Severe  Constipation:                    [ ]Mild [ ]Moderate [ ]Severe    PAINAD Score: 2    http://geriatrictoolkit.[place].[facility]/cog/painad.pdf (Ctrl +  left click to view)  		  Other Symptoms: Agitation   [x]All other review of systems negative     Palliative Performance Status Version 2:   20%         [email or web address]/files/news/[email or web address]_performance_scale_[email or web address].pdf    PHYSICAL EXAM:  Vital Signs Last 24 Hrs  T(C): 34.7 (06 Feb 2020 08:29), Max: 36.4 (06 Feb 2020 05:15)  T(F): 94.5 (06 Feb 2020 08:29), Max: 97.5 (06 Feb 2020 05:15)  HR: 76 (06 Feb 2020 08:29) (73 - 84)  BP: 167/82 (06 Feb 2020 08:29) (76/41 - 167/82)  RR: 20 (06 Feb 2020 08:29) (18 - 20)  SpO2: 96% (06 Feb 2020 08:29) (96% - 98%) I&O's Summary    05 Feb 2020 07:01  -  06 Feb 2020 07:00  --------------------------------------------------------  IN: 0 mL / OUT: 400 mL / NET: -400 mL    GENERAL:  [ ]Alert  [ ]Oriented x   [x]Lethargic  [ ]Cachexia  [ ]Unarousable  [ ]Verbal  [x ]Non-Verbal  Behavioral:   [ ] Anxiety  [ ] Delirium [x] Agitation [ ] Other  HEENT:  [ ]Normal   [x]Dry mouth   [ ]ET Tube/Trach  [ ]Oral lesions  PULMONARY:   [x]Clear [ ]Tachypnea  [ ]Audible excessive secretions   [ ]Rhonchi        [ ]Right [ ]Left [ ]Bilateral  [ ]Crackles        [ ]Right [ ]Left [ ]Bilateral  [ ]Wheezing     [ ]Right [ ]Left [ ]Bilateral  [ ]Diminshed BS [ ]Right [ ]Left [ ]Bilateral    CARDIOVASCULAR:    [ ]Regular [x]Irregular [ ]Tachy  [ ]Hung [ ]Murmur [ ]Other  GASTROINTESTINAL:  [x]Soft  [ ]Distended   [x]+BS  [x]Non tender [ ]Tender  [ ]PEG [ ]OGT/ NGT   Last BM:   fecal incontinence    GENITOURINARY:  [ ]Normal [ ] Incontinent   [ ]Oliguria/Anuria   [ ]Siddiqi  MUSCULOSKELETAL:   [ ]Normal   [x]Weakness  [ ]Bed/Wheelchair bound [ ]Edema  NEUROLOGIC:   [ ]No focal deficits  [x] Cognitive impairment  [x] Dysphagia [x]Dysarthria [x] Paresis [ ]Other   SKIN:   [x]Normal  [ ]Rash     [ ]Pressure ulcer(s)  [ ]y [ ]n  Present on admission      CRITICAL CARE:  [ ] Shock Present  [ ]Septic [ ]Cardiogenic [ ]Neurologic [ ]Hypovolemic  [ ]  Vasopressors [ ]  Inotropes   [ ] Respiratory failure present [ ] Mechanical Ventilation [ ] Non-invasive ventilatory support [ ] High-Flow  [ ] Acute  [ ] Chronic [ ] Hypoxic  [ ] Hypercarbic [ ] Other  [x ] Other organ failure  brain    LABS:  No blood draws   PROTEIN CALORIE MALNUTRITION:  https://www.[email or web address]/vault/2440/web/files/ONC/Table_Clinical%20Characteristics%20to%20Document%20Malnutrition-White%20JV%20et%20al%789694.pdf    Height (cm): 152.4 (01-23-20 @ 13:10)  Weight (kg): 74 (01-23-20 @ 13:10)  BMI (kg/m2): 31.9 (01-23-20 @ 13:10)    [x] PPSV2 < or = 30% [ ] significant weight loss [ ] poor nutritional intake [ ] anasarca Prealbumin, Serum: 14 mg/dL (01-30-20 @ 09:34)    Artificial Nutrition [ ]     REFERRALS:   [ ]Chaplaincy  [ ] Hospice  [ ]Child Life  [ ]Social Work  [ ]Case management [ ]Holistic Therapy [ ] Physical Therapy [ ] Dietary   Goals of Care Document:

## 2020-02-06 NOTE — PROGRESS NOTE ADULT - PROBLEM SELECTOR PLAN 2
Acute LMCA ischemic stroke with hemorrhagic conversion- likely cardioembolic given hx of Afib. Acute LMCA ischemic stroke with hemorrhagic conversion-

## 2020-02-06 NOTE — PROGRESS NOTE ADULT - ATTENDING COMMENTS
I have personally seen and examined this patient and agree with the above assessment and plan, which I have reviewed and edited where appropriate.     GOC: Symptom directed care in the setting of dementia and CVA  Symptoms: Delirium, clinical condition changing - active dying process present  Disposition: Patient with acute medical need for IV medication for delirium.  Not likely to survive to discharge.      Family at bedside,  educated as to what to expect.  Questions answered.  Emotional support provided.

## 2020-02-06 NOTE — PROGRESS NOTE ADULT - PROBLEM SELECTOR PLAN 3
Progressive Dysphagia this hospital course likely exacerbated by AMS and acute CVA, failed speech and swallow eval 2/3.   -GOC: HCP does not want PEG, which are aligned with patient's goals  -Unlikely that patient will pass a repeat s/s eval at this time, will defer for now

## 2020-02-06 NOTE — PROGRESS NOTE ADULT - ASSESSMENT
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

## 2020-02-07 NOTE — PROGRESS NOTE ADULT - SUBJECTIVE AND OBJECTIVE BOX
GAP TEAM PALLIATIVE CARE UNIT PROGRESS NOTE:      [  ] Patient on hospice program.    INDICATION FOR PALLIATIVE CARE UNIT SERVICES:  Delirium in setting of acute CVA in a patient with advanced dementia, PCU admission for symptom management (agitation and dyspnea), GOC ongoing.     INTERVAL HPI/OVERNIGHT EVENTS: No acute events overnight. Patient was seen and examined at bedside. Patient is obtunded, does not respond to verbal stimuli this morning. Patient received Haldol 0.5 mg IVP x2 prn agitation and Ativan 0.5 mg x3 prn in last 24 hours. Patient's son and daughter are present bedside.    DNR on chart: Yes    Allergies  Allergy Status Unknown  Intolerances    MEDICATIONS  (STANDING):  hydrALAZINE Injectable 10 milliGRAM(s) IV Push every 6 hours  levothyroxine Injectable 62.5 MICROGram(s) IV Push at bedtime  nitroglycerin    2% Ointment 0.5 Inch(s) Transdermal daily  valproate sodium IVPB 500 milliGRAM(s) IV Intermittent two times a day    MEDICATIONS  (PRN):  acetaminophen  Suppository .. 650 milliGRAM(s) Rectal every 6 hours PRN Mild Pain (1 - 3)  glycopyrrolate Injectable 0.4 milliGRAM(s) IV Push every 4 hours PRN oropharyngeal secretions.  haloperidol    Injectable 0.5 milliGRAM(s) IV Push every 6 hours PRN Agitation  LORazepam   Injectable 0.5 milliGRAM(s) IV Push every 1 hour PRN Agitation/Anxiety  morphine  - Injectable 0.5 milliGRAM(s) IV Push every 1 hour PRN Pain  morphine  - Injectable 0.5 milliGRAM(s) IV Push every 1 hour PRN Dyspnea    ITEMS UNCHECKED ARE NOT PRESENT    PRESENT SYMPTOMS: [x]Unable to obtain due to poor mentation   Source if other than patient:  [ ]Family   [x]Team     Pain: [ ] yes [x] no  QOL impact -   Location -                    Aggravating factors -  Quality -  Radiation -  Timing-  Severity (0-10 scale):  Minimal acceptable level (0-10 scale):     Dyspnea:                           [ ]Mild [ ]Moderate [ ]Severe  Anxiety:                             [ ]Mild [x]Moderate [ ]Severe  Fatigue:                             [ ]Mild [ ]Moderate [x]Severe  Nausea:                             [ ]Mild [ ]Moderate [ ]Severe  Loss of appetite:              [ ]Mild [x]Moderate [x]Severe  Constipation:                    [ ]Mild [ ]Moderate [ ]Severe    PAINAD Score: 2, occasional grimacing    http://geriatrictoolkit.[email or web address]/cog/painad.pdf (Ctrl +  left click to view)  		  Other Symptoms: Agitation   [x]All other review of systems negative     Palliative Performance Status Version 2:   10%         [email or web address]/[email or web address]_performance_scale_[email or web address].pdf    PHYSICAL EXAM:  Vital Signs Last 24 Hrs  T(C): 36.3 (07 Feb 2020 08:36), Max: 36.3 (07 Feb 2020 08:36)  T(F): 97.3 (07 Feb 2020 08:36), Max: 97.3 (07 Feb 2020 08:36)  HR: 93 (07 Feb 2020 11:54) (73 - 93)  BP: 137/103 (07 Feb 2020 11:54) (128/85 - 157/91)  BP(mean): --  RR: 20 (07 Feb 2020 08:36) (20 - 20)  SpO2: 97% (07 Feb 2020 08:36) (97% - 97%)    GENERAL:  [ ]Alert  [ ]Oriented x   [x]Lethargic  [ ]Cachexia  [x]Unarousable  [ ]Verbal  [x ]Non-Verbal  Behavioral:   [ ] Anxiety  [ ] Delirium [x] Agitation [ ] Other  HEENT:  [ ]Normal   [x]Dry mouth   [ ]ET Tube/Trach  [ ]Oral lesions  PULMONARY:   [x]Clear [x]Tachypnea  [ ]Audible excessive secretions   [ ]Rhonchi        [ ]Right [ ]Left [ ]Bilateral  [ ]Crackles        [ ]Right [ ]Left [ ]Bilateral  [ ]Wheezing     [ ]Right [ ]Left [ ]Bilateral  [x]Diminshed BS [ ]Right [ ]Left [x]Bilateral    CARDIOVASCULAR:    [ ]Regular [x]Irregular [ ]Tachy  [ ]Hung [ ]Murmur [ ]Other  GASTROINTESTINAL:  [x]Soft  [ ]Distended   [x]+BS  [x]Non tender [ ]Tender  [ ]PEG [ ]OGT/ NGT   Last BM:   fecal incontinence  GENITOURINARY:  [ ]Normal [x] Incontinent   [ ]Oliguria/Anuria   [x]Siddiqi  MUSCULOSKELETAL:   [ ]Normal   [x]Weakness  [x]Bed/Wheelchair bound [ ]Edema  NEUROLOGIC:   [ ]No focal deficits  [x] Cognitive impairment  [x] Dysphagia [x]Dysarthria [x] Paresis [ ]Other   SKIN:   [x]Normal  [ ]Rash     [ ]Pressure ulcer(s)  [ ]y [ ]n  Present on admission      CRITICAL CARE:  [ ] Shock Present  [ ]Septic [ ]Cardiogenic [ ]Neurologic [ ]Hypovolemic  [ ]  Vasopressors [ ]  Inotropes   [ ] Respiratory failure present [ ] Mechanical Ventilation [ ] Non-invasive ventilatory support [ ] High-Flow  [ ] Acute  [ ] Chronic [ ] Hypoxic  [ ] Hypercarbic [ ] Other  [x ] Other organ failure; brain    LABS:  No blood draws   PROTEIN CALORIE MALNUTRITION:  https://[email or web address]/vault/[email or web address]/web/files/ONC/Table_Clinical%20Characteristics%20to%20Document%20Malnutrition-White%20JV%20et%20al%110120.pdf    Height (cm): 152.4 (01-23-20 @ 13:10)  Weight (kg): 74 (01-23-20 @ 13:10)  BMI (kg/m2): 31.9 (01-23-20 @ 13:10)    [x] PPSV2 < or = 30% [ ] significant weight loss [x] poor nutritional intake [ ] anasarca Prealbumin, Serum: 14 mg/dL (01-30-20 @ 09:34)    Artificial Nutrition [ ]     REFERRALS:   [ ]Chaplaincy  [ ] Hospice  [ ]Child Life  [x]Social Work  [ ]Case management [ ]Holistic Therapy [ ] Physical Therapy [ ] Dietary

## 2020-02-07 NOTE — CHART NOTE - NSCHARTNOTEFT_GEN_A_CORE
Nutrition Follow Up     As per RN, pt inappropriate for nutrition follow up at this time.     RD remains available.   Roma Gleason MS RD CDN [facility],  #[phone]

## 2020-02-07 NOTE — PROGRESS NOTE ADULT - PROBLEM SELECTOR PLAN 6
Continued GOC discussions and DC planning with family. Patient requiring both ativan and haldol for control of agitation. Emotional support provided.

## 2020-02-07 NOTE — PROGRESS NOTE ADULT - PROBLEM SELECTOR PLAN 1
Likely related to acute CVA, and prior history of advanced dementia, prior CVAs. Consciousness and alertness is waxing and waning, mostly obtunded today. Required Ativan 0.5 mg IV x2 prn agitation and Haldol 0.5 mg IV x2 prn.    -Haldol 0.5 mg IV q6hr prn agitation  -Ativan 0.5 mg IV q1hr prn refractory agitation

## 2020-02-07 NOTE — PROGRESS NOTE ADULT - ASSESSMENT
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 Patient was transferred to PCU for symptom management, mainly agitation and anticipatory management of symptoms. Patient continues to decline, no PO intake, and requiring more frequent medication with haldol and ativan for agitation. 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 Patient was transferred to PCU for symptom management, mainly agitation and anticipatory management of symptoms. Patient continues to decline, no PO intake, and requiring more frequent medication with haldol and ativan for agitation.

## 2020-02-07 NOTE — PROGRESS NOTE ADULT - ATTENDING COMMENTS
I have personally seen and examined this patient and agree with the above assessment and plan, which I have reviewed and edited where appropriate.     GOC: Symptom directed care in the setting of dementia and CVA  Symptoms: Delirium, clinical condition changing - active dying process present  Disposition: Patient with acute medical need for IV medication for delirium which is likely terminal delirium.    Family at bedside,  educated as to what to expect.  Questions answered.  Emotional support provided.

## 2020-02-08 NOTE — PROGRESS NOTE ADULT - ASSESSMENT
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 Patient was transferred to PCU for symptom management, mainly agitation and anticipatory management of symptoms. Patient continues to decline, no PO intake, and requiring more frequent medication with haldol and ativan for agitation. Started on Haldol ATC.

## 2020-02-08 NOTE — PROGRESS NOTE ADULT - ATTENDING COMMENTS
I have personally seen and examined this patient and agree with the above assessment and plan, which I have reviewed and edited where appropriate.     GOC: Symptom directed care in the setting of dementia and CVA  Symptoms: Delirium, clinical condition changing - active dying process present  Disposition: Patient with acute medical need for IV medication for delirium which is likely terminal delirium.    Family at bedside,  educated as to what to expect.  Questions answered.  Emotional support provided. I have personally seen and examined this patient and agree with the above assessment and plan, which I have reviewed and edited where appropriate.     Start haldol atc and prn.  Try to avoid benzo for concern of worsening agitation

## 2020-02-08 NOTE — PROGRESS NOTE ADULT - PROBLEM SELECTOR PLAN 3
Progressive Dysphagia this hospital course likely exacerbated by AMS and acute CVA, failed speech and swallow eval 2/3. GOC: HCP does not want PEG, which are aligned with patient's goals. Unlikely that patient will pass a repeat s/s eval at this time, will defer.

## 2020-02-08 NOTE — PROGRESS NOTE ADULT - PROBLEM SELECTOR PLAN 1
Likely related to acute CVA, and prior history of advanced dementia, prior CVAs. Consciousness and alertness is waxing and waning, mostly obtunded today. Required Ativan 0.5 mg IV x2 prn agitation and Haldol 0.5 mg IV x3 prn.    -Haldol 0.5 mg IV q8hr ATC  -Ativan 0.5 mg IV q1hr prn refractory agitation

## 2020-02-08 NOTE — PROGRESS NOTE ADULT - PROBLEM SELECTOR PLAN 6
Continued GOC discussions and DC planning with family. Patient requires Haldol IV and Ativan IV for increasing agitation. Emotional support provided.

## 2020-02-08 NOTE — PROGRESS NOTE ADULT - SUBJECTIVE AND OBJECTIVE BOX
GAP TEAM PALLIATIVE CARE UNIT PROGRESS NOTE:      [  ] Patient on hospice program.    INDICATION FOR PALLIATIVE CARE UNIT SERVICES:  Delirium in setting of acute CVA in a patient with advanced dementia, PCU admission for symptom management (agitation and dyspnea), GOC ongoing.     INTERVAL HPI/OVERNIGHT EVENTS: No acute events overnight. Patient was seen and examined at bedside. Patient is obtunded, does not respond to verbal stimuli this morning. Patient received Haldol 0.5 mg IVP x2 prn agitation and Ativan 0.5 mg x3 prn in last 24 hours. Patient started on Haldol 0.5 mg IV ATC for agitation.     DNR on chart: Yes    Allergies  Allergy Status Unknown  Intolerances    MEDICATIONS  (STANDING):  haloperidol    Injectable 0.5 milliGRAM(s) IV Push every 8 hours  hydrALAZINE Injectable 10 milliGRAM(s) IV Push every 6 hours  levothyroxine Injectable 62.5 MICROGram(s) IV Push at bedtime  nitroglycerin    2% Ointment 0.5 Inch(s) Transdermal daily  valproate sodium IVPB 500 milliGRAM(s) IV Intermittent two times a day    MEDICATIONS  (PRN):  acetaminophen  Suppository .. 650 milliGRAM(s) Rectal every 6 hours PRN Mild Pain (1 - 3)  glycopyrrolate Injectable 0.4 milliGRAM(s) IV Push every 4 hours PRN oropharyngeal secretions.  LORazepam   Injectable 0.5 milliGRAM(s) IV Push every 1 hour PRN Agitation/Anxiety  morphine  - Injectable 0.5 milliGRAM(s) IV Push every 1 hour PRN Pain  morphine  - Injectable 0.5 milliGRAM(s) IV Push every 1 hour PRN Dyspnea      ITEMS UNCHECKED ARE NOT PRESENT    PRESENT SYMPTOMS: [x]Unable to obtain due to poor mentation   Source if other than patient:  [ ]Family   [x]Team     Pain: [ ] yes [x] no  QOL impact -   Location -                    Aggravating factors -  Quality -  Radiation -  Timing-  Severity (0-10 scale):  Minimal acceptable level (0-10 scale):     Dyspnea:                           [ ]Mild [ ]Moderate [ ]Severe  Anxiety:                             [ ]Mild [x]Moderate [ ]Severe  Fatigue:                             [ ]Mild [ ]Moderate [x]Severe  Nausea:                             [ ]Mild [ ]Moderate [ ]Severe  Loss of appetite:              [ ]Mild [x]Moderate [x]Severe  Constipation:                    [ ]Mild [ ]Moderate [ ]Severe    PAINAD Score: 0    [email or web address] (Ctrl +  left click to view)  		  Other Symptoms: Agitation   [x]All other review of systems negative     Palliative Performance Status Version 2:   10%         [email or web address]    PHYSICAL EXAM:  Vital Signs Last 24 Hrs  T(C): 36.4 (08 Feb 2020 08:07), Max: 36.4 (08 Feb 2020 08:07)  T(F): 97.5 (08 Feb 2020 08:07), Max: 97.5 (08 Feb 2020 08:07)  HR: 113 (08 Feb 2020 08:07) (108 - 113)  BP: 144/79 (08 Feb 2020 08:07) (144/79 - 167/103)  BP(mean): --  RR: 18 (08 Feb 2020 08:07) (18 - 18)  SpO2: 98% (08 Feb 2020 08:07) (98% - 98%)    GENERAL: fragile elderly female  [ ]Alert  [ ]Oriented x   [x]Lethargic  [ ]Cachexia  [x]Unarousable  [ ]Verbal  [x ]Non-Verbal  Behavioral:   [ ] Anxiety  [ ] Delirium [x] Agitation [ ] Other  HEENT:  [ ]Normal   [x]Dry mouth   [ ]ET Tube/Trach  [ ]Oral lesions  PULMONARY:   [x]Clear [x]Tachypnea  [ ]Audible excessive secretions   [ ]Rhonchi        [ ]Right [ ]Left [ ]Bilateral  [ ]Crackles        [ ]Right [ ]Left [ ]Bilateral  [ ]Wheezing     [ ]Right [ ]Left [ ]Bilateral  [x]Diminshed BS [ ]Right [ ]Left [x]Bilateral    CARDIOVASCULAR:    [ ]Regular [x]Irregular [ ]Tachy  [ ]Hung [ ]Murmur [ ]Other  GASTROINTESTINAL:  [x]Soft  [ ]Distended   [x]+BS  [x]Non tender [ ]Tender  [ ]PEG [ ]OGT/ NGT   Last BM:   fecal incontinence  GENITOURINARY:  [ ]Normal [x] Incontinent   [ ]Oliguria/Anuria   [x]Siddiqi  MUSCULOSKELETAL:   [ ]Normal   [x]Weakness  [x]Bed/Wheelchair bound [ ]Edema  NEUROLOGIC:   [ ]No focal deficits  [x] Cognitive impairment  [x] Dysphagia [x]Dysarthria [x] Paresis [ ]Other   SKIN:   [x]Normal  [ ]Rash     [ ]Pressure ulcer(s)  [ ]y [ ]n  Present on admission      CRITICAL CARE:  [ ] Shock Present  [ ]Septic [ ]Cardiogenic [ ]Neurologic [ ]Hypovolemic  [ ]  Vasopressors [ ]  Inotropes   [ ] Respiratory failure present [ ] Mechanical Ventilation [ ] Non-invasive ventilatory support [ ] High-Flow  [ ] Acute  [ ] Chronic [ ] Hypoxic  [ ] Hypercarbic [ ] Other  [x ] Other organ failure; brain    LABS:  No blood draws   PROTEIN CALORIE MALNUTRITION:  https://[email or web address]/vault/2440/web/files/ONC/Table_Clinical%20Characteristics%20to%20Document%20Malnutrition-White%20JV%20et%[email or web address].pdf    Height (cm): 152.4 (01-23-20 @ 13:10)  Weight (kg): 74 (01-23-20 @ 13:10)  BMI (kg/m2): 31.9 (01-23-20 @ 13:10)    [x] PPSV2 < or = 30% [ ] significant weight loss [x] poor nutritional intake [ ] anasarca Prealbumin, Serum: 14 mg/dL (01-30-20 @ 09:34)    Artificial Nutrition [ ]     REFERRALS:   [ ]Chaplaincy  [ ] Hospice  [ ]Child Life  [x]Social Work  [ ]Case management [ ]Holistic Therapy [ ] Physical Therapy [ ] Dietary

## 2020-02-09 NOTE — PROGRESS NOTE ADULT - NSHPATTENDINGPLANDISCUSS_GEN_ALL_CORE
team
cardiology fellow; patient seen and examined.       I was physically present for the key portions of the evaluation and management (E/M) service provided.    I agree with the above history, physical, and plan which I have reviewed and edited where appropriate.
Kim Leggett, NP
Family
family, team
family, team

## 2020-02-09 NOTE — PROGRESS NOTE ADULT - PROBLEM SELECTOR PLAN 1
Likely related to acute CVA, and prior history of advanced dementia, prior CVAs. Consciousness and alertness is waxing and waning, mostly obtunded today. Agitation well controlled with Haldol 0.5 mg IV q8hr ATC.    -C/w Haldol 0.5 mg IV q8hr ATC  -Ativan 0.5 mg IV q1hr prn refractory agitation

## 2020-02-09 NOTE — PROGRESS NOTE ADULT - PROBLEM SELECTOR PLAN 6
Continued GOC discussions and DC planning with family. Patient requires Haldol IV ATC for agitation. Will need to readdress Hospice referral, family asked for time to think about Hospice over the weekend.

## 2020-02-09 NOTE — PROGRESS NOTE ADULT - ASSESSMENT
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 Patient was transferred to PCU for symptom management, mainly agitation and anticipatory management of symptoms. Patient continues to decline, no PO intake, and requiring Haldol ATC for control of agitation. Family to decide if they would like to pursue a Hospice Referral.

## 2020-02-09 NOTE — PROGRESS NOTE ADULT - ATTENDING COMMENTS
I have personally seen and examined this patient and agree with the above assessment and plan, which I have reviewed and edited where appropriate.     Start haldol atc and prn.  Try to avoid benzo for concern of worsening agitation I have personally seen and examined this patient and agree with the above assessment and plan, which I have reviewed and edited where appropriate.     continue haldol atc and prn.  Try to avoid benzo for concern of worsening agitation.  hospice eval in am

## 2020-02-09 NOTE — PROGRESS NOTE ADULT - SUBJECTIVE AND OBJECTIVE BOX
GAP TEAM PALLIATIVE CARE UNIT PROGRESS NOTE:      [  ] Patient on hospice program.    INDICATION FOR PALLIATIVE CARE UNIT SERVICES:  Delirium in setting of acute CVA in a patient with advanced dementia, PCU admission for symptom management (agitation and dyspnea), GOC ongoing.     INTERVAL HPI/OVERNIGHT EVENTS: No acute events overnight. Patient was seen and examined at bedside. Patient is obtunded, does not respond to verbal stimuli this morning. Patient responded well to Haldol 0.5 mg IV ATC for agitation. No PRNs required in last 24 hours.     DNR on chart: Yes    Allergies  Allergy Status Unknown  Intolerances    MEDICATIONS  (STANDING):  haloperidol    Injectable 0.5 milliGRAM(s) IV Push every 8 hours  hydrALAZINE Injectable 10 milliGRAM(s) IV Push every 6 hours  levothyroxine Injectable 62.5 MICROGram(s) IV Push at bedtime  nitroglycerin    2% Ointment 0.5 Inch(s) Transdermal daily  valproate sodium IVPB 500 milliGRAM(s) IV Intermittent two times a day    MEDICATIONS  (PRN):  acetaminophen  Suppository .. 650 milliGRAM(s) Rectal every 6 hours PRN Mild Pain (1 - 3)  glycopyrrolate Injectable 0.4 milliGRAM(s) IV Push every 4 hours PRN oropharyngeal secretions.  LORazepam   Injectable 0.5 milliGRAM(s) IV Push every 1 hour PRN Agitation/Anxiety  morphine  - Injectable 0.5 milliGRAM(s) IV Push every 1 hour PRN Pain  morphine  - Injectable 0.5 milliGRAM(s) IV Push every 1 hour PRN Dyspnea    ITEMS UNCHECKED ARE NOT PRESENT    PRESENT SYMPTOMS: [x]Unable to obtain due to poor mentation   Source if other than patient:  [ ]Family   [x]Team     Pain: [ ] yes [x] no  QOL impact -   Location -                    Aggravating factors -  Quality -  Radiation -  Timing-  Severity (0-10 scale):  Minimal acceptable level (0-10 scale):     Dyspnea:                           [ ]Mild [ ]Moderate [ ]Severe  Anxiety:                             [ ]Mild [x]Moderate [ ]Severe  Fatigue:                             [ ]Mild [ ]Moderate [x]Severe  Nausea:                             [ ]Mild [ ]Moderate [ ]Severe  Loss of appetite:              [ ]Mild [x]Moderate [x]Severe  Constipation:                    [ ]Mild [ ]Moderate [ ]Severe    PAINAD Score: 0    [email or web address] (Ctrl +  left click to view)  		  Other Symptoms: Agitation   [x]All other review of systems negative     Palliative Performance Status Version 2:   10%         http://npcrc.org/files/news/palliative_performance_scale_[email or web address].pdf    PHYSICAL EXAM:  Vital Signs Last 24 Hrs  T(C): 36.3 (09 Feb 2020 08:12), Max: 36.3 (09 Feb 2020 08:12)  T(F): 97.4 (09 Feb 2020 08:12), Max: 97.4 (09 Feb 2020 08:12)  HR: 105 (09 Feb 2020 08:12) (90 - 105)  BP: 160/71 (09 Feb 2020 08:12) (135/67 - 160/71)  BP(mean): --  RR: 18 (09 Feb 2020 08:12) (18 - 18)  SpO2: 96% (09 Feb 2020 08:12) (95% - 96%)    GENERAL: fragile elderly female  [ ]Alert  [ ]Oriented x   [x]Lethargic  [ ]Cachexia  [x]Unarousable  [ ]Verbal  [x ]Non-Verbal  Behavioral: calm  [ ] Anxiety  [ ] Delirium [] Agitation [ ] Other  HEENT:  [ ]Normal   [x]Dry mouth   [ ]ET Tube/Trach  [ ]Oral lesions  PULMONARY:   [x]Clear []Tachypnea  [x]Audible excessive secretions   [ ]Rhonchi        [ ]Right [ ]Left [ ]Bilateral  [ ]Crackles        [ ]Right [ ]Left [ ]Bilateral  [ ]Wheezing     [ ]Right [ ]Left [ ]Bilateral  [x]Diminshed BS [ ]Right [ ]Left [x]Bilateral    CARDIOVASCULAR:    [ ]Regular [x]Irregular [ ]Tachy  [ ]Hung [ ]Murmur [ ]Other  GASTROINTESTINAL:  [x]Soft  [ ]Distended   [x]+BS  [x]Non tender [ ]Tender  [ ]PEG [ ]OGT/ NGT   Last BM: 2/8/2020  fecal incontinence  GENITOURINARY:  [ ]Normal [x] Incontinent   [ ]Oliguria/Anuria   [x]Siddiqi  MUSCULOSKELETAL:   [ ]Normal   [x]Weakness  [x]Bed/Wheelchair bound [ ]Edema  NEUROLOGIC:   [ ]No focal deficits  [x] Cognitive impairment  [x] Dysphagia [x]Dysarthria [x] Paresis [ ]Other   SKIN:   [x]Normal  [ ]Rash     [ ]Pressure ulcer(s)  [ ]y [ ]n  Present on admission      CRITICAL CARE:  [ ] Shock Present  [ ]Septic [ ]Cardiogenic [ ]Neurologic [ ]Hypovolemic  [ ]  Vasopressors [ ]  Inotropes   [ ] Respiratory failure present [ ] Mechanical Ventilation [ ] Non-invasive ventilatory support [ ] High-Flow  [ ] Acute  [ ] Chronic [ ] Hypoxic  [ ] Hypercarbic [ ] Other  [x ] Other organ failure; brain    LABS:  No blood draws   PROTEIN CALORIE MALNUTRITION:  https://www.andeal.org/vault/2440/web/files/ONC/Table_Clinical%20Characteristics%20to%20Document%20Malnutrition-White%20JV%20et%20al%938445.pdf    Height (cm): 152.4 (01-23-20 @ 13:10)  Weight (kg): 74 (01-23-20 @ 13:10)  BMI (kg/m2): 31.9 (01-23-20 @ 13:10)    [x] PPSV2 < or = 30% [ ] significant weight loss [x] poor nutritional intake [ ] anasarca Prealbumin, Serum: 14 mg/dL (01-30-20 @ 09:34)    Artificial Nutrition [ ]     REFERRALS:   [ ]Chaplaincy  [ ] Hospice  [ ]Child Life  [x]Social Work  [ ]Case management [ ]Holistic Therapy [ ] Physical Therapy [ ] Dietary

## 2020-02-10 NOTE — PROGRESS NOTE ADULT - SUBJECTIVE AND OBJECTIVE BOX
GAP TEAM PALLIATIVE CARE UNIT PROGRESS NOTE:      [  ] Patient on hospice program.    INDICATION FOR PALLIATIVE CARE UNIT SERVICES: Delirium in setting of acute CVA in a patient with advanced dementia, PCU admission for symptom management (agitation and dyspnea), GOC ongoing.     INTERVAL HPI/OVERNIGHT EVENTS: Patient seen and examined at bedside, Lethargic,  breathing heavy, Received Morphine 0.5 mg IVP .   02 sats was 83 this Morning on RA, started O2 Nasal canula 2-3 liters.  BP: 104/51, repeat BP  was 136/56. PT/ OT  following , but patient unable to participate today.   Family meeting scheduled for Wednesday @ 11 Am for  ? hospice  placement        DNR on chart: Yes    Allergies    Allergy Status Unknown    Intolerances    MEDICATIONS  (STANDING):  haloperidol    Injectable 0.5 milliGRAM(s) IV Push every 8 hours  hydrALAZINE Injectable 10 milliGRAM(s) IV Push every 6 hours  levothyroxine Injectable 62.5 MICROGram(s) IV Push at bedtime  nitroglycerin    2% Ointment 0.5 Inch(s) Transdermal daily  valproate sodium IVPB 500 milliGRAM(s) IV Intermittent two times a day    MEDICATIONS  (PRN):  acetaminophen  Suppository .. 650 milliGRAM(s) Rectal every 6 hours PRN Mild Pain (1 - 3)  glycopyrrolate Injectable 0.4 milliGRAM(s) IV Push every 4 hours PRN oropharyngeal secretions.  LORazepam   Injectable 0.5 milliGRAM(s) IV Push every 1 hour PRN Agitation/Anxiety  morphine  - Injectable 0.5 milliGRAM(s) IV Push every 1 hour PRN Pain  morphine  - Injectable 0.5 milliGRAM(s) IV Push every 1 hour PRN Dyspnea    ITEMS UNCHECKED ARE NOT PRESENT    PRESENT SYMPTOMS: [ ]Unable to obtain due to poor mentation   Source if other than patient:  [ ]Family   [ ]Team     Pain: [ ] yes [x] no  QOL impact -   Location -                    Aggravating factors -  Quality -  Radiation -  Timing-  Severity (0-10 scale):  Minimal acceptable level (0-10 scale):     Dyspnea:                           [ ]Mild [ ]Moderate [ ]Severe  Anxiety:                             [ ]Mild [x]Moderate [ ]Severe  Fatigue:                             [ ]Mild [ ]Moderate [x]Severe  Nausea:                             [ ]Mild [ ]Moderate [ ]Severe  Loss of appetite:              [ ]Mild [x]Moderate [x]Severe  Constipation:                    [ ]Mild [ ]Moderate [ ]Severe    PAINAD Score: 0    [email or web address]/[email or web address] (Ctrl +  left click to view)  		  Other Symptoms: Agitation   [x]All other review of systems negative     Palliative Performance Status Version 2:   10%         [email or web address]      PHYSICAL EXAM:  Vital Signs Last 24 Hrs  T(C): 36.9 (10 Feb 2020 07:43), Max: 36.9 (10 Feb 2020 07:43)  T(F): 98.5 (10 Feb 2020 07:43), Max: 98.5 (10 Feb 2020 07:43)  HR: 83 (10 Feb 2020 13:05) (83 - 120)  BP: 151/55 (10 Feb 2020 13:05) (104/51 - 151/55)  BP(mean): --  RR: 24 (10 Feb 2020 07:43) (24 - 24)  SpO2: 94% (10 Feb 2020 13:05) (83% - 94%) I&O's Summary    09 Feb 2020 07:01  -  10 Feb 2020 07:00  --------------------------------------------------------  IN: 0 mL / OUT: 200 mL / NET: -200 mL      GENERAL: fragile elderly female  [ ]Alert  [ ]Oriented x   [x]Lethargic  [ ]Cachexia  [x]Unarousable  [ ]Verbal  [x ]Non-Verbal  Behavioral: calm  [ ] Anxiety  [ ] Delirium [] Agitation [ ] Other  HEENT:  [ ]Normal   [x]Dry mouth   [ ]ET Tube/Trach  [ ]Oral lesions  PULMONARY:   [x]Clear []Tachypnea  [x]Audible excessive secretions   [ ]Rhonchi        [ ]Right [ ]Left [ ]Bilateral  [ ]Crackles        [ ]Right [ ]Left [ ]Bilateral  [ ]Wheezing     [ ]Right [ ]Left [ ]Bilateral  [x]Diminshed BS [ ]Right [ ]Left [x]Bilateral    CARDIOVASCULAR:    [ ]Regular [x]Irregular [ ]Tachy  [ ]Hung [ ]Murmur [ ]Other  GASTROINTESTINAL:  [x]Soft  [ ]Distended   [x]+BS  [x]Non tender [ ]Tender  [ ]PEG [ ]OGT/ NGT   Last BM: 2/8/2020  fecal incontinence  GENITOURINARY:  [ ]Normal [x] Incontinent   [ ]Oliguria/Anuria   [x]Siddiqi  MUSCULOSKELETAL:   [ ]Normal   [x]Weakness  [x]Bed/Wheelchair bound [ ]Edema  NEUROLOGIC:   [ ]No focal deficits  [x] Cognitive impairment  [x] Dysphagia [x]Dysarthria [x] Paresis [ ]Other   SKIN:   [x]Normal  [ ]Rash     [ ]Pressure ulcer(s)  [ ]y [ ]n  Present on admission      CRITICAL CARE:  [ ] Shock Present  [ ]Septic [ ]Cardiogenic [ ]Neurologic [ ]Hypovolemic  [ ]  Vasopressors [ ]  Inotropes   [ ] Respiratory failure present [ ] Mechanical Ventilation [ ] Non-invasive ventilatory support [ ] High-Flow  [ ] Acute  [ ] Chronic [ ] Hypoxic  [ ] Hypercarbic [ ] Other  [x ] Other organ failure; brain    LABS: None     RADIOLOGY & ADDITIONAL STUDIES: None     PROTEIN CALORIE MALNUTRITION:  https://www.andeal.org/vault/2440/web/files/ONC/Table_Clinical%20Characteristics%20to%20Document%20Malnutrition-White%20JV%20et%20al%725981.pdf    Height (cm): 152.4 (01-23-20 @ 13:10)  Weight (kg): 74 (01-23-20 @ 13:10)  BMI (kg/m2): 31.9 (01-23-20 @ 13:10)      PROTEIN CALORIE MALNUTRITION:   [x ] PPSV2 < or = 30% [ ] significant weight loss [ ] poor nutritional intake [ ] anasarca [ ] catabolic state   Prealbumin, Serum: 14 mg/dL (01-30-20 @ 09:34)   Artificial Nutrition [ ]     REFERRALS:   [ ]Chaplaincy  [ ] Hospice  [ ]Child Life  [ x]Social Work  [ ]Case management [ ]Holistic Therapy [ ] Physical Therapy [ ] Dietary   Care Coordination Document: Progress Notes - Care Coordination [C. Provider] (02-10-20 @ 13:57)                                       Progress Notes    PROGRESS NOTE  Date & Time of Note   2020-02-10 01:43    Notes    Notes: Palliative Care :      JAH was in communication with patient's son regarding setting up a family  meeting to discuss goals of care and discharge planning. Patient's son reported  that they could be here on Wed. 2/12/20 at 11am. LMSW confirmed date and time.  LMSW informed medical team of above. Social Work will continue to remain  available and collaborate with interdisciplinary team.       Electronically signed by:  Acacia Olson LMSW  Electronically signed on:  2020-02-10  13:57        Goals of Care Document:

## 2020-02-10 NOTE — PROGRESS NOTE ADULT - PROBLEM SELECTOR PLAN 1
Likely related to acute CVA, and prior history of advanced dementia, prior CVAs. Consciousness and alertness is waxing and waning,  Agitation well controlled with Haldol 0.5 mg IV q8hr ATC.    -C/w Haldol 0.5 mg IV q8hr ATC  -Ativan 0.5 mg IV q1hr prn refractory agitation

## 2020-02-10 NOTE — PROGRESS NOTE ADULT - ATTENDING COMMENTS
No agitation  Looks comfortable  No acute issues  Transient hypotension  Forthcoming family meeting on Wednesday

## 2020-02-10 NOTE — PROGRESS NOTE ADULT - ASSESSMENT
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 Patient was transferred to PCU for symptom management, mainly agitation and anticipatory management of symptoms. Patient continues to decline, no PO intake, and requiring Haldol ATC for control of agitation. Family to decide if they would like to pursue a Hospice Referral Family meeting scheduled and planned for Wednesday 2/12/20 at 11 am

## 2020-02-11 NOTE — PROGRESS NOTE ADULT - PROBLEM SELECTOR PLAN 1
Likely related to acute CVA, and prior history of advanced dementia, prior CVAs. Overall, minimally to unresponsive at this time with intermittent periods of agitation. Agitation fairly well controlled with Haldol 0.5 mg IV q 8hr ATC.    -C/w Haldol 0.5 mg IV q8hr ATC  -Ativan 0.5 mg IV q1hr prn refractory agitation  - Remains on Depacon IV for seizure prophylaxis

## 2020-02-11 NOTE — PROVIDER CONTACT NOTE (OTHER) - ACTION/TREATMENT ORDERED:
NP Chambers came to see pt and spoke to daughter. Continue warm packs and comfort care. Continue to monitor and assess.

## 2020-02-11 NOTE — PROVIDER CONTACT NOTE (OTHER) - SITUATION
Notice swelling on the left side of the neck.
Patient on Cardene drip and to be transported to CT scan.
Pt agitated, combative not cooperating. Pt refusing VS. Trying to climb out of bed throwing punches and kicking.

## 2020-02-11 NOTE — CHART NOTE - NSCHARTNOTEFT_GEN_A_CORE
Per discussion with RN pt is not appropriate for nutrition follow up at this time, diet discontinued 2/7, RD to remain available as needed.    Glenna Kelley R.D., [facility], Pager #[phone]

## 2020-02-11 NOTE — PROVIDER CONTACT NOTE (OTHER) - BACKGROUND
PMH: HTN, Dementia, prior stroke.   Dx: AMS L MCA infart w/evolving hemorragic stroke
Patient with consecutive blood pressure within defined parameters.
DX R cerebral hemorrhage.

## 2020-02-11 NOTE — PROGRESS NOTE ADULT - ASSESSMENT
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 Patient was transferred to PCU for symptom management, mainly agitation and anticipatory management of symptoms. Patient continues to decline, no PO intake, and requiring Haldol ATC for control of agitation. Family to decide if they would like to pursue a Hospice Referral Family meeting scheduled and planned for Wednesday 2/12/20 at 11 am.    Morphine prn x 1 in the past 24 hours. Patient remains with wet cough. Robinul in place prn. Unable to tolerate anything by mouth at this time.

## 2020-02-11 NOTE — PROVIDER CONTACT NOTE (OTHER) - REASON
Notice swelling on the left side of the neck.
Patient to be transported to CT scan
Pt agitated, combative not cooperating

## 2020-02-11 NOTE — PROGRESS NOTE ADULT - SUBJECTIVE AND OBJECTIVE BOX
GAP TEAM PALLIATIVE CARE UNIT PROGRESS NOTE:      [  ] Patient on hospice program.    INDICATION FOR PALLIATIVE CARE UNIT SERVICES: Delirium in setting of acute CVA in a patient with advanced dementia, PCU admission for symptom management (agitation and dyspnea), GOC ongoing.     INTERVAL HPI/OVERNIGHT EVENTS: Morphine prn x 1 in the past 24 hours. Patient has coarse lung sounds and wet cough. Haldol remains in place ATC. Minimally responsive on exam. Robinul remains in place prn. Planned family meeting for tomorrow to discuss transition to inpatient hospice facility.    DNR on chart: Yes    Allergies    Allergy Status Unknown    Intolerances    MEDICATIONS  (STANDING):  haloperidol    Injectable 0.5 milliGRAM(s) IV Push every 8 hours  hydrALAZINE Injectable 10 milliGRAM(s) IV Push every 6 hours  levothyroxine Injectable 62.5 MICROGram(s) IV Push at bedtime  nitroglycerin    2% Ointment 0.5 Inch(s) Transdermal daily  valproate sodium IVPB 500 milliGRAM(s) IV Intermittent two times a day    MEDICATIONS  (PRN):  acetaminophen  Suppository .. 650 milliGRAM(s) Rectal every 6 hours PRN Mild Pain (1 - 3)  glycopyrrolate Injectable 0.4 milliGRAM(s) IV Push every 4 hours PRN oropharyngeal secretions.  LORazepam   Injectable 0.5 milliGRAM(s) IV Push every 1 hour PRN Agitation/Anxiety  morphine  - Injectable 0.5 milliGRAM(s) IV Push every 1 hour PRN Pain  morphine  - Injectable 0.5 milliGRAM(s) IV Push every 1 hour PRN Dyspnea      ITEMS UNCHECKED ARE NOT PRESENT    PRESENT SYMPTOMS: [ ]Unable to obtain due to poor mentation   Source if other than patient:  [ ]Family   [ ]Team     Pain: [ ] yes [x] no  QOL impact -   Location -                    Aggravating factors -  Quality -  Radiation -  Timing-  Severity (0-10 scale):  Minimal acceptable level (0-10 scale):     Dyspnea:                           [ ]Mild [ ]Moderate [ ]Severe  Anxiety:                             [ ]Mild [x]Moderate [ ]Severe  Fatigue:                             [ ]Mild [ ]Moderate [ ]Severe  Nausea:                             [ ]Mild [ ]Moderate [ ]Severe  Loss of appetite:              [ ]Mild [x]Moderate [ ]Severe  Constipation:                    [ ]Mild [ ]Moderate [ ]Severe    PAINAD Score: 2    [email or web address].[email or web address].[email or web address]/cog/painad.pdf (Ctrl +  left click to view)  		  Other Symptoms: Agitation   [x]All other review of systems negative     Palliative Performance Status Version 2:   10%         http://npcrc.org/files/news/pallia      PHYSICAL EXAM:  Vital Signs Last 24 Hrs  T(C): 36.4 (11 Feb 2020 08:00), Max: 36.4 (11 Feb 2020 08:00)  T(F): 97.5 (11 Feb 2020 08:00), Max: 97.5 (11 Feb 2020 08:00)  HR: 109 (11 Feb 2020 08:00) (83 - 109)  BP: 141/58 (11 Feb 2020 08:00) (122/75 - 151/55)  BP(mean): --  RR: 20 (11 Feb 2020 08:00) (20 - 20)  SpO2: 88% (11 Feb 2020 08:00) (88% - 94%)    09 Feb 2020 07:01  -  10 Feb 2020 07:00  --------------------------------------------------------  IN: 0 mL / OUT: 200 mL / NET: -200 mL      GENERAL: fragile elderly female  [ ]Alert  [ ]Oriented x   [x]Lethargic  [ ]Cachexia  [x]Unarousable  [ ]Verbal  [x ]Non-Verbal  Behavioral:   [ ] Anxiety  [ ] Delirium [x] Agitation-occasional [ ] Other  HEENT:  [ ]Normal   [x]Dry mouth   [ ]ET Tube/Trach  [ ]Oral lesions  PULMONARY:   [ ]Clear []Tachypnea  [x]Audible excessive secretions   [ x]Rhonchi        [ ]Right [ ]Left [x ]Bilateral  [ ]Crackles        [ ]Right [ ]Left [ ]Bilateral  [ ]Wheezing     [ ]Right [ ]Left [ ]Bilateral  [x]Diminshed BS [ ]Right [ ]Left [x]Bilateral    CARDIOVASCULAR:    [ ]Regular [x]Irregular [x ]Tachy  [ ]Hung [ ]Murmur [ ]Other  GASTROINTESTINAL:  [x]Soft  [ ]Distended   [x]+BS  [x]Non tender [ ]Tender  [ ]PEG [ ]OGT/ NGT   Last BM: 2/10/2020  fecal incontinence  GENITOURINARY:  [ ]Normal [x] Incontinent   [ ]Oliguria/Anuria   [x]Siddiqi  MUSCULOSKELETAL:   [ ]Normal   [x]Weakness  [x]Bed/Wheelchair bound [ ]Edema  NEUROLOGIC:   [ ]No focal deficits  [x] Cognitive impairment  [x] Dysphagia [x]Dysarthria [x] Paresis [ ]Other   SKIN:   [x]Normal  [ ]Rash     [ ]Pressure ulcer(s)  [ ]y [ ]n  Present on admission      CRITICAL CARE:  [ ] Shock Present  [ ]Septic [ ]Cardiogenic [ ]Neurologic [ ]Hypovolemic  [ ]  Vasopressors [ ]  Inotropes   [ ] Respiratory failure present [ ] Mechanical Ventilation [ ] Non-invasive ventilatory support [ ] High-Flow  [ ] Acute  [ ] Chronic [ ] Hypoxic  [ ] Hypercarbic [ ] Other  [x ] Other organ failure; brain    LABS: None     RADIOLOGY & ADDITIONAL STUDIES: None     PROTEIN CALORIE MALNUTRITION:  https://www.andeal.org/vault/2440/web/files/ONC/Table_Clinical%20Characteristics%20to%20Document%20Malnutrition-White%20JV%20et%[email or web address].pdf    Height (cm): 152.4 (01-23-20 @ 13:10)  Weight (kg): 74 (01-23-20 @ 13:10)  BMI (kg/m2): 31.9 (01-23-20 @ 13:10)      PROTEIN CALORIE MALNUTRITION:   [x ] PPSV2 < or = 30% [ ] significant weight loss [ ] poor nutritional intake [ ] anasarca [ ] catabolic state   Prealbumin, Serum: 14 mg/dL (01-30-20 @ 09:34)   Artificial Nutrition [ ]     REFERRALS:   [ ]Chaplaincy  [ ] Hospice  [ ]Child Life  [ x]Social Work  [ ]Case management [ ]Holistic Therapy [ ] Physical Therapy [ ] Dietary   Care Coordination Document: Progress Notes - Care Coordination [C. Provider] (02-10-20 @ 13:57)                                       Progress Notes    PROGRESS NOTE  Date & Time of Note   2020-02-10 01:43    Notes    Notes: Palliative Care :      JAH was in communication with patient's son regarding setting up a family  meeting to discuss goals of care and discharge planning. Patient's son reported  that they could be here on Wed. 2/12/20 at 11am. LMSW confirmed date and time.  LMSW informed medical team of above. Social Work will continue to remain  available and collaborate with interdisciplinary team.       Electronically signed by:  Acacia Olson LMSW  Electronically signed on:  2020-02-10  13:57        Goals of Care Document: GAP TEAM PALLIATIVE CARE UNIT PROGRESS NOTE:      [  ] Patient on hospice program.    INDICATION FOR PALLIATIVE CARE UNIT SERVICES: Delirium in setting of acute CVA in a patient with advanced dementia, PCU admission for symptom management (agitation and dyspnea), GOC ongoing.     INTERVAL HPI/OVERNIGHT EVENTS: Morphine prn x 1 in the past 24 hours. Patient has coarse lung sounds and wet cough. Haldol remains in place ATC. Minimally responsive on exam. Robinul remains in place prn. Planned family meeting for tomorrow to discuss transition to inpatient hospice facility.    DNR on chart: Yes    Allergies    Allergy Status Unknown    Intolerances    MEDICATIONS  (STANDING):  haloperidol    Injectable 0.5 milliGRAM(s) IV Push every 8 hours  hydrALAZINE Injectable 10 milliGRAM(s) IV Push every 6 hours  levothyroxine Injectable 62.5 MICROGram(s) IV Push at bedtime  nitroglycerin    2% Ointment 0.5 Inch(s) Transdermal daily  valproate sodium IVPB 500 milliGRAM(s) IV Intermittent two times a day    MEDICATIONS  (PRN):  acetaminophen  Suppository .. 650 milliGRAM(s) Rectal every 6 hours PRN Mild Pain (1 - 3)  glycopyrrolate Injectable 0.4 milliGRAM(s) IV Push every 4 hours PRN oropharyngeal secretions.  LORazepam   Injectable 0.5 milliGRAM(s) IV Push every 1 hour PRN Agitation/Anxiety  morphine  - Injectable 0.5 milliGRAM(s) IV Push every 1 hour PRN Pain  morphine  - Injectable 0.5 milliGRAM(s) IV Push every 1 hour PRN Dyspnea      ITEMS UNCHECKED ARE NOT PRESENT    PRESENT SYMPTOMS: [ x]Unable to obtain due to poor mentation   Source if other than patient:  [ ]Family   [ ]Team     Pain: [ ] yes [] no  QOL impact -   Location -                    Aggravating factors -  Quality -  Radiation -  Timing-  Severity (0-10 scale):  Minimal acceptable level (0-10 scale):     Dyspnea:                           [ ]Mild [ ]Moderate [ ]Severe  Anxiety:                             [ ]Mild []Moderate [ ]Severe  Fatigue:                             [ ]Mild [ ]Moderate [ ]Severe  Nausea:                             [ ]Mild [ ]Moderate [ ]Severe  Loss of appetite:              [ ]Mild []Moderate [ ]Severe  Constipation:                    [ ]Mild [ ]Moderate [ ]Severe    PAINAD Score: 2    [email or web address].[email or web address].[facility]/cog/painad.pdf (Ctrl +  left click to view)  		  Other Symptoms:   []All other review of systems negative     Palliative Performance Status Version 2:   10%         http://npcrc.org/files/news/pallia      PHYSICAL EXAM:  Vital Signs Last 24 Hrs  T(C): 36.4 (11 Feb 2020 08:00), Max: 36.4 (11 Feb 2020 08:00)  T(F): 97.5 (11 Feb 2020 08:00), Max: 97.5 (11 Feb 2020 08:00)  HR: 109 (11 Feb 2020 08:00) (83 - 109)  BP: 141/58 (11 Feb 2020 08:00) (122/75 - 151/55)  BP(mean): --  RR: 20 (11 Feb 2020 08:00) (20 - 20)  SpO2: 88% (11 Feb 2020 08:00) (88% - 94%)    09 Feb 2020 07:01  -  10 Feb 2020 07:00  --------------------------------------------------------  IN: 0 mL / OUT: 200 mL / NET: -200 mL      GENERAL: fragile elderly female  [ ]Alert  [ ]Oriented x   [x]Lethargic  [ ]Cachexia  [x]Unarousable  [ ]Verbal  [x ]Non-Verbal  Behavioral:   [ ] Anxiety  [ ] Delirium [x] Agitation-occasional [ ] Other  HEENT:  [ ]Normal   [x]Dry mouth   [ ]ET Tube/Trach  [ ]Oral lesions  PULMONARY:   [ ]Clear []Tachypnea  [x]Audible excessive secretions   [ x]Rhonchi        [ ]Right [ ]Left [x ]Bilateral  [ ]Crackles        [ ]Right [ ]Left [ ]Bilateral  [ ]Wheezing     [ ]Right [ ]Left [ ]Bilateral  [x]Diminshed BS [ ]Right [ ]Left [x]Bilateral    CARDIOVASCULAR:    [ ]Regular [x]Irregular [x ]Tachy  [ ]Hung [ ]Murmur [ ]Other  GASTROINTESTINAL:  [x]Soft  [ ]Distended   [x]+BS  [x]Non tender [ ]Tender  [ ]PEG [ ]OGT/ NGT   Last BM: 2/10/2020  fecal incontinence  GENITOURINARY:  [ ]Normal [x] Incontinent   [ ]Oliguria/Anuria   [x]Siddiqi  MUSCULOSKELETAL:   [ ]Normal   [x]Weakness  [x]Bed/Wheelchair bound [ ]Edema  NEUROLOGIC:   [ ]No focal deficits  [x] Cognitive impairment  [x] Dysphagia [x]Dysarthria [x] Paresis [ ]Other   SKIN:   [x]Normal  [ ]Rash     [ ]Pressure ulcer(s)  [ ]y [ ]n  Present on admission      CRITICAL CARE:  [ ] Shock Present  [ ]Septic [ ]Cardiogenic [ ]Neurologic [ ]Hypovolemic  [ ]  Vasopressors [ ]  Inotropes   [ ] Respiratory failure present [ ] Mechanical Ventilation [ ] Non-invasive ventilatory support [ ] High-Flow  [ ] Acute  [ ] Chronic [ ] Hypoxic  [ ] Hypercarbic [ ] Other  [x ] Other organ failure; brain    LABS: None     RADIOLOGY & ADDITIONAL STUDIES: None     PROTEIN CALORIE MALNUTRITION: severe  https://www.andeal.org/vault/2440/web/files/ONC/Table_Clinical%20Characteristics%20to%20Document%20Malnutrition-White%20JV%20et%20al%936212.pdf    Height (cm): 152.4 (01-23-20 @ 13:10)  Weight (kg): 74 (01-23-20 @ 13:10)  BMI (kg/m2): 31.9 (01-23-20 @ 13:10)      PROTEIN CALORIE MALNUTRITION:   [x ] PPSV2 < or = 30% [ ] significant weight loss [ x] poor nutritional intake [ ] anasarca [ ] catabolic state   Prealbumin, Serum: 14 mg/dL (01-30-20 @ 09:34)   Artificial Nutrition [ ]     REFERRALS:   [ ]Chaplaincy  [ ] Hospice  [ ]Child Life  [ x]Social Work  [ ]Case management [ ]Holistic Therapy [ ] Physical Therapy [ ] Dietary   Care Coordination Document: Progress Notes - Care Coordination [C. Provider] (02-10-20 @ 13:57)                                       Progress Notes    PROGRESS NOTE  Date & Time of Note   2020-02-10 01:43    Notes    Notes: Palliative Care :      JAH was in communication with patient's son regarding setting up a family  meeting to discuss goals of care and discharge planning. Patient's son reported  that they could be here on Wed. 2/12/20 at 11am. LMSW confirmed date and time.  LMSW informed medical team of above. Social Work will continue to remain  available and collaborate with interdisciplinary team.       Electronically signed by:  Acacia Olson LMSW  Electronically signed on:  2020-02-10  13:57        Goals of Care Document:

## 2020-02-11 NOTE — PROVIDER CONTACT NOTE (OTHER) - RECOMMENDATIONS
MD Borja will assess PT, continue to monitor
If can come see patient . Warm packs. Continue to monitor and assess.

## 2020-02-11 NOTE — PROGRESS NOTE ADULT - PROBLEM SELECTOR PLAN 3
Progressive Dysphagia this hospital course likely exacerbated by AMS and acute CVA, failed speech and swallow eval 2/3.   Current GOC: HCP does not want PEG, which are aligned with patient's goals.

## 2020-02-11 NOTE — PROGRESS NOTE ADULT - ATTENDING COMMENTS
Patient seen and examined and agree with the above documentation with the following additions:   Patient appears comfortable on examination. VSS; will d/c nitropaste and reorder clonidine patch. monitor BP.   Required x1 dose morphine over last 24 hours. Remains on ATC haldol and IV AEDs. Last BM 2/10.  follow up planned with family 2/12 to discuss hospice transition. patient is appropriate for inpatient hospice due to need for IV AEDs, and management of agitation requiring ATC antipsychotics. Will also add glycopyrrolate for management of secretions. rest of management as documented above.

## 2020-02-11 NOTE — PROVIDER CONTACT NOTE (OTHER) - ASSESSMENT
As per daughter notice swelling on the left side of neck.  Hard to touch and red. Patient seems in no distress.
No acute distress noted.
Unable to assess pt due to behavior and not cooperating

## 2020-02-11 NOTE — PROGRESS NOTE ADULT - PROBLEM SELECTOR PLAN 6
Continued GOC discussions with family. Planned family meeting for tomorrow 2/12/20 at 11 am. MOLST with DNR/DNI present in the chart.

## 2020-02-12 NOTE — PROGRESS NOTE ADULT - PROBLEM SELECTOR PLAN 6
Continued GOC discussions with family. Planned family meeting for tomorrow 2/12/20 at 11 am. MOLST with DNR/DNI present in the chart. PPSV2 10%. needs nursing care with all ADL's. Bedbound.

## 2020-02-12 NOTE — PROGRESS NOTE ADULT - SUBJECTIVE AND OBJECTIVE BOX
GAP TEAM PALLIATIVE CARE UNIT PROGRESS NOTE:      [  ] Patient on hospice program.    INDICATION FOR PALLIATIVE CARE UNIT SERVICES: Delirium in setting of acute CVA in a patient with advanced dementia, PCU admission for symptom management (agitation and dyspnea), GOC ongoing.     INTERVAL HPI/OVERNIGHT EVENTS:     DNR on chart: Yes    Allergies    Allergy Status Unknown    Intolerances    MEDICATIONS  (STANDING):  cloNIDine Patch 0.1 mG/24Hr(s) 1 patch Transdermal every 7 days  haloperidol    Injectable 0.5 milliGRAM(s) IV Push every 8 hours  hydrALAZINE Injectable 10 milliGRAM(s) IV Push every 6 hours  levothyroxine Injectable 62.5 MICROGram(s) IV Push at bedtime  valproate sodium IVPB 500 milliGRAM(s) IV Intermittent two times a day    MEDICATIONS  (PRN):  acetaminophen  Suppository .. 650 milliGRAM(s) Rectal every 6 hours PRN Mild Pain (1 - 3)  glycopyrrolate Injectable 0.4 milliGRAM(s) IV Push every 4 hours PRN oropharyngeal secretions.  LORazepam   Injectable 0.5 milliGRAM(s) IV Push every 1 hour PRN Agitation/Anxiety  morphine  - Injectable 0.5 milliGRAM(s) IV Push every 1 hour PRN Pain  morphine  - Injectable 0.5 milliGRAM(s) IV Push every 1 hour PRN Dyspnea      ITEMS UNCHECKED ARE NOT PRESENT    PRESENT SYMPTOMS: [ x]Unable to obtain due to poor mentation   Source if other than patient:  [ ]Family   [ ]Team     Pain: [ ] yes [] no  QOL impact -   Location -                    Aggravating factors -  Quality -  Radiation -  Timing-  Severity (0-10 scale):  Minimal acceptable level (0-10 scale):     Dyspnea:                           [ ]Mild [ ]Moderate [ ]Severe  Anxiety:                             [ ]Mild []Moderate [ ]Severe  Fatigue:                             [ ]Mild [ ]Moderate [ ]Severe  Nausea:                             [ ]Mild [ ]Moderate [ ]Severe  Loss of appetite:              [ ]Mild []Moderate [ ]Severe  Constipation:                    [ ]Mild [ ]Moderate [ ]Severe    PAINAD Score: 2    [email or web address]/cog/painad.pdf (Ctrl +  left click to view)  		  Other Symptoms:   []All other review of systems negative     Palliative Performance Status Version 2:   10%         [email or web address]      PHYSICAL EXAM:  Vital Signs Last 24 Hrs  T(C): 36.4 (11 Feb 2020 08:00), Max: 36.4 (11 Feb 2020 08:00)  T(F): 97.5 (11 Feb 2020 08:00), Max: 97.5 (11 Feb 2020 08:00)  HR: 109 (11 Feb 2020 08:00) (83 - 109)  BP: 141/58 (11 Feb 2020 08:00) (122/75 - 151/55)  BP(mean): --  RR: 20 (11 Feb 2020 08:00) (20 - 20)  SpO2: 88% (11 Feb 2020 08:00) (88% - 94%)    09 Feb 2020 07:01  -  10 Feb 2020 07:00  --------------------------------------------------------  IN: 0 mL / OUT: 200 mL / NET: -200 mL      GENERAL: fragile elderly female  [ ]Alert  [ ]Oriented x   [x]Lethargic  [ ]Cachexia  [x]Unarousable  [ ]Verbal  [x ]Non-Verbal  Behavioral:   [ ] Anxiety  [ ] Delirium [x] Agitation-occasional [ ] Other  HEENT:  [ ]Normal   [x]Dry mouth   [ ]ET Tube/Trach  [ ]Oral lesions  PULMONARY:   [ ]Clear []Tachypnea  [x]Audible excessive secretions   [ x]Rhonchi        [ ]Right [ ]Left [x ]Bilateral  [ ]Crackles        [ ]Right [ ]Left [ ]Bilateral  [ ]Wheezing     [ ]Right [ ]Left [ ]Bilateral  [x]Diminshed BS [ ]Right [ ]Left [x]Bilateral    CARDIOVASCULAR:    [ ]Regular [x]Irregular [x ]Tachy  [ ]Hung [ ]Murmur [ ]Other  GASTROINTESTINAL:  [x]Soft  [ ]Distended   [x]+BS  [x]Non tender [ ]Tender  [ ]PEG [ ]OGT/ NGT   Last BM: 2/10/2020  fecal incontinence  GENITOURINARY:  [ ]Normal [x] Incontinent   [ ]Oliguria/Anuria   [x]Siddiqi  MUSCULOSKELETAL:   [ ]Normal   [x]Weakness  [x]Bed/Wheelchair bound [ ]Edema  NEUROLOGIC:   [ ]No focal deficits  [x] Cognitive impairment  [x] Dysphagia [x]Dysarthria [x] Paresis [ ]Other   SKIN:   [x]Normal  [ ]Rash     [ ]Pressure ulcer(s)  [ ]y [ ]n  Present on admission      CRITICAL CARE:  [ ] Shock Present  [ ]Septic [ ]Cardiogenic [ ]Neurologic [ ]Hypovolemic  [ ]  Vasopressors [ ]  Inotropes   [ ] Respiratory failure present [ ] Mechanical Ventilation [ ] Non-invasive ventilatory support [ ] High-Flow  [ ] Acute  [ ] Chronic [ ] Hypoxic  [ ] Hypercarbic [ ] Other  [x ] Other organ failure; brain    LABS: None     RADIOLOGY & ADDITIONAL STUDIES: None     PROTEIN CALORIE MALNUTRITION: severe  https://[email or web address].org/vault/2440/web/files/ONC/Table_Clinical%20Characteristics%20to%20Document%20Malnutrition-White%20JV%20et%20al%099625.pdf    Height (cm): 152.4 (01-23-20 @ 13:10)  Weight (kg): 74 (01-23-20 @ 13:10)  BMI (kg/m2): 31.9 (01-23-20 @ 13:10)      PROTEIN CALORIE MALNUTRITION:   [x ] PPSV2 < or = 30% [ ] significant weight loss [ x] poor nutritional intake [ ] anasarca [ ] catabolic state   Prealbumin, Serum: 14 mg/dL (01-30-20 @ 09:34)   Artificial Nutrition [ ]     REFERRALS:   [ ]Chaplaincy  [ ] Hospice  [ ]Child Life  [ x]Social Work  [ ]Case management [ ]Holistic Therapy [ ] Physical Therapy [ ] Dietary   Care Coordination Document: Progress Notes - Care Coordination [C. Provider] (02-10-20 @ 13:57)                                       Progress Notes    PROGRESS NOTE  Date & Time of Note   2020-02-10 01:43    Notes    Notes: Palliative Care :      JAH was in communication with patient's son regarding setting up a family  meeting to discuss goals of care and discharge planning. Patient's son reported  that they could be here on Wed. 2/12/20 at 11am. LMSW confirmed date and time.  LMSW informed medical team of above. Social Work will continue to remain  available and collaborate with interdisciplinary team.       Electronically signed by:  Acacia Olson LMSW  Electronically signed on:  2020-02-10  13:57        Goals of Care Document: GAP TEAM PALLIATIVE CARE UNIT PROGRESS NOTE:      [  ] Patient on hospice program.    INDICATION FOR PALLIATIVE CARE UNIT SERVICES: Delirium in setting of acute CVA in a patient with advanced dementia, PCU admission for symptom management (agitation and dyspnea), GOC ongoing.     INTERVAL HPI/OVERNIGHT EVENTS: Morphine prn x 1 and Robinul prn x 1 in the past 24 hours. Noted to have erythema and swelling more pronounced on right side of neck suspicious for sialidinitis likely 2/2 to increased purulent secretions. Doxycycline 100 mg ordered IVSS q 12 hours and Morphine 0.5 mg ordered ATC as patient appeared uncomfortable on assessment. PRN Morphine increased to 1 mg. Awaiting family arrival to discuss possible transition to inpatient hospice facility.    DNR on chart: Yes    Allergies    Allergy Status Unknown    Intolerances    MEDICATIONS  (STANDING):  cloNIDine Patch 0.1 mG/24Hr(s) 1 patch Transdermal every 7 days  doxycycline IVPB 100 milliGRAM(s) IV Intermittent every 12 hours  hydrALAZINE Injectable 10 milliGRAM(s) IV Push every 6 hours  levothyroxine Injectable 62.5 MICROGram(s) IV Push at bedtime  morphine  - Injectable 0.5 milliGRAM(s) IV Push every 6 hours  valproate sodium IVPB 500 milliGRAM(s) IV Intermittent two times a day    MEDICATIONS  (PRN):  acetaminophen  Suppository .. 650 milliGRAM(s) Rectal every 6 hours PRN Mild Pain (1 - 3)  glycopyrrolate Injectable 0.4 milliGRAM(s) IV Push every 4 hours PRN oropharyngeal secretions.  LORazepam   Injectable 0.5 milliGRAM(s) IV Push every 1 hour PRN Agitation/Anxiety  morphine  - Injectable 1 milliGRAM(s) IV Push every 1 hour PRN Pain  morphine  - Injectable 1 milliGRAM(s) IV Push every 1 hour PRN Dyspnea        ITEMS UNCHECKED ARE NOT PRESENT    PRESENT SYMPTOMS: [ x]Unable to obtain due to poor mentation   Source if other than patient:  [ ]Family   [ ]Team     Pain: [ ] yes [] no  QOL impact -   Location -                    Aggravating factors -  Quality -  Radiation -  Timing-  Severity (0-10 scale):  Minimal acceptable level (0-10 scale):     Dyspnea:                           [ ]Mild [ ]Moderate [ ]Severe  Anxiety:                             [ ]Mild []Moderate [ ]Severe  Fatigue:                             [ ]Mild [ ]Moderate [ ]Severe  Nausea:                             [ ]Mild [ ]Moderate [ ]Severe  Loss of appetite:              [ ]Mild []Moderate [ ]Severe  Constipation:                    [ ]Mild [ ]Moderate [ ]Severe    PAINAD Score: 4    [email or web address].[email or web address].[email or web address]/cog/painad.pdf (Ctrl +  left click to view)  		  Other Symptoms:   []All other review of systems negative     Palliative Performance Status Version 2:   10%         [email or web address]      PHYSICAL EXAM:  Vital Signs Last 24 Hrs  T(C): 36.4 (12 Feb 2020 09:01), Max: 36.4 (12 Feb 2020 09:01)  T(F): 97.5 (12 Feb 2020 09:01), Max: 97.5 (12 Feb 2020 09:01)  HR: 86 (12 Feb 2020 09:01) (86 - 97)  BP: 145/70 (12 Feb 2020 09:01) (126/74 - 154/69)  BP(mean): --  RR: 20 (12 Feb 2020 09:01) (20 - 20)  SpO2: 98% (12 Feb 2020 09:01) (98% - 98%)    09 Feb 2020 07:01  -  10 Feb 2020 07:00  --------------------------------------------------------  IN: 0 mL / OUT: 200 mL / NET: -200 mL      GENERAL: fragile elderly female  [ ]Alert  [ ]Oriented x   [x]Lethargic  [ ]Cachexia  [x]Unarousable  [ ]Verbal  [x ]Non-Verbal  Behavioral:   [ ] Anxiety  [ ] Delirium [x] Agitation-occasional [ ] Other  HEENT:  [ ]Normal   [x]Dry mouth   [ ]ET Tube/Trach  [ ]Oral lesions  PULMONARY:   [ ]Clear []Tachypnea  [x]Audible excessive secretions (purulent)  [ x]Rhonchi        [ ]Right [ ]Left [x ]Bilateral  [ ]Crackles        [ ]Right [ ]Left [ ]Bilateral  [ ]Wheezing     [ ]Right [ ]Left [ ]Bilateral  [x]Diminshed BS [ ]Right [ ]Left [x]Bilateral    CARDIOVASCULAR:    [ ]Regular [x]Irregular [x ]Tachy  [ ]Hung [ ]Murmur [ ]Other  GASTROINTESTINAL:  [x]Soft  [ ]Distended   [x]+BS  [x]Non tender [ ]Tender  [ ]PEG [ ]OGT/ NGT   Last BM: 2/12/2020  fecal incontinence  GENITOURINARY:  [ ]Normal [x] Incontinent   [ ]Oliguria/Anuria   [x]Siddiqi  MUSCULOSKELETAL:   [ ]Normal   [x]Weakness  [x]Bed/Wheelchair bound [ ]Edema  NEUROLOGIC:   [ ]No focal deficits  [x] Cognitive impairment  [x] Dysphagia [x]Dysarthria [x] Paresis [ ]Other   SKIN:   [x]Normal  [ ]Rash     [ ]Pressure ulcer(s)  [ ]y [ ]n  Present on admission  [x]sialidinits present (swollen, tender and with erythema)    CRITICAL CARE:  [ ] Shock Present  [ ]Septic [ ]Cardiogenic [ ]Neurologic [ ]Hypovolemic  [ ]  Vasopressors [ ]  Inotropes   [ ] Respiratory failure present [ ] Mechanical Ventilation [ ] Non-invasive ventilatory support [ ] High-Flow  [ ] Acute  [ ] Chronic [ ] Hypoxic  [ ] Hypercarbic [ ] Other  [x ] Other organ failure; brain    LABS: None     RADIOLOGY & ADDITIONAL STUDIES: None     PROTEIN CALORIE MALNUTRITION: severe  https://[email or web address].org/vault/2440/web/files/ONC/Table_Clinical%20Characteristics%20to%20Document%20Malnutrition-White%20JV%20et%20al%314822.pdf    Height (cm): 152.4 (01-23-20 @ 13:10)  Weight (kg): 74 (01-23-20 @ 13:10)  BMI (kg/m2): 31.9 (01-23-20 @ 13:10)      PROTEIN CALORIE MALNUTRITION:   [x ] PPSV2 < or = 30% [ ] significant weight loss [ x] poor nutritional intake [ ] anasarca [ ] catabolic state   Prealbumin, Serum: 14 mg/dL (01-30-20 @ 09:34)   Artificial Nutrition [ ]     REFERRALS:   [ ]Chaplaincy  [ ] Hospice  [ ]Child Life  [ x]Social Work  [ ]Case management [ ]Holistic Therapy [ ] Physical Therapy [ ] Dietary   Care Coordination Document: Progress Notes - Care Coordination [C. Provider] (02-10-20 @ 13:57)                                       Progress Notes    PROGRESS NOTE  Date & Time of Note   2020-02-10 01:43    Notes    Notes: Palliative Care :      LMSW was in communication with patient's son regarding setting up a family  meeting to discuss goals of care and discharge planning. Patient's son reported  that they could be here on Wed. 2/12/20 at 11am. LMSW confirmed date and time.  LMSW informed medical team of above. Social Work will continue to remain  available and collaborate with interdisciplinary team.       Electronically signed by:  Acacia Olson LMSW  Electronically signed on:  2020-02-10  13:57        Goals of Care Document: GAP TEAM PALLIATIVE CARE UNIT PROGRESS NOTE:      [  ] Patient on hospice program.    INDICATION FOR PALLIATIVE CARE UNIT SERVICES: Delirium in setting of acute CVA in a patient with advanced dementia, PCU admission for symptom management (agitation and dyspnea), GOC ongoing.     INTERVAL HPI/OVERNIGHT EVENTS: Morphine prn x 1 and Robinul prn x 1 in the past 24 hours. Noted to have erythema and swelling more pronounced on right side of neck suspicious for sialidinitis likely 2/2 to increased purulent secretions. Doxycycline 100 mg ordered IVSS q 12 hours and Morphine 0.5 mg ordered ATC as patient appeared uncomfortable on assessment. PRN Morphine increased to 1 mg. Awaiting family arrival to discuss possible transition to inpatient hospice facility.    DNR on chart: Yes    Allergies    Allergy Status Unknown    Intolerances    MEDICATIONS  (STANDING):  cloNIDine Patch 0.1 mG/24Hr(s) 1 patch Transdermal every 7 days  doxycycline IVPB 100 milliGRAM(s) IV Intermittent every 12 hours  hydrALAZINE Injectable 10 milliGRAM(s) IV Push every 6 hours  levothyroxine Injectable 62.5 MICROGram(s) IV Push at bedtime  morphine  - Injectable 0.5 milliGRAM(s) IV Push every 6 hours  valproate sodium IVPB 500 milliGRAM(s) IV Intermittent two times a day    MEDICATIONS  (PRN):  acetaminophen  Suppository .. 650 milliGRAM(s) Rectal every 6 hours PRN Mild Pain (1 - 3)  glycopyrrolate Injectable 0.4 milliGRAM(s) IV Push every 4 hours PRN oropharyngeal secretions.  LORazepam   Injectable 0.5 milliGRAM(s) IV Push every 1 hour PRN Agitation/Anxiety  morphine  - Injectable 1 milliGRAM(s) IV Push every 1 hour PRN Pain  morphine  - Injectable 1 milliGRAM(s) IV Push every 1 hour PRN Dyspnea    ITEMS UNCHECKED ARE NOT PRESENT    PRESENT SYMPTOMS: [ x]Unable to obtain due to poor mentation   Source if other than patient:  [ ]Family   [ ]Team     Pain: [ ] yes [] no  QOL impact -   Location -                    Aggravating factors -  Quality -  Radiation -  Timing-  Severity (0-10 scale):  Minimal acceptable level (0-10 scale):     Dyspnea:                           [ ]Mild [ ]Moderate [ ]Severe  Anxiety:                             [ ]Mild []Moderate [ ]Severe  Fatigue:                             [ ]Mild [ ]Moderate [ ]Severe  Nausea:                             [ ]Mild [ ]Moderate [ ]Severe  Loss of appetite:              [ ]Mild []Moderate [ ]Severe  Constipation:                    [ ]Mild [ ]Moderate [ ]Severe    PAINAD Score: 4    [email or web address].[email or web address]/cog/painad.pdf (Ctrl +  left click to view)  		  Other Symptoms:   []All other review of systems negative     Palliative Performance Status Version 2:   10%         [email or web address]      PHYSICAL EXAM:  Vital Signs Last 24 Hrs  T(C): 36.4 (12 Feb 2020 09:01), Max: 36.4 (12 Feb 2020 09:01)  T(F): 97.5 (12 Feb 2020 09:01), Max: 97.5 (12 Feb 2020 09:01)  HR: 86 (12 Feb 2020 09:01) (86 - 97)  BP: 145/70 (12 Feb 2020 09:01) (126/74 - 154/69)  BP(mean): --  RR: 20 (12 Feb 2020 09:01) (20 - 20)  SpO2: 98% (12 Feb 2020 09:01) (98% - 98%)    09 Feb 2020 07:01  -  10 Feb 2020 07:00  --------------------------------------------------------  IN: 0 mL / OUT: 200 mL / NET: -200 mL      GENERAL: fragile elderly female  [ ]Alert  [ ]Oriented x   [x]Lethargic  [ ]Cachexia  [x]Unarousable  [ ]Verbal  [x ]Non-Verbal  Behavioral:   [ ] Anxiety  [ ] Delirium [x] Agitation-occasional [ ] Other  HEENT:  [ ]Normal   [x]Dry mouth   [ ]ET Tube/Trach  [ ]Oral lesions  PULMONARY:   [ ]Clear []Tachypnea  [x]Audible excessive secretions (purulent)  [ x]Rhonchi        [ ]Right [ ]Left [x ]Bilateral  [ ]Crackles        [ ]Right [ ]Left [ ]Bilateral  [ ]Wheezing     [ ]Right [ ]Left [ ]Bilateral  [x]Diminshed BS [ ]Right [ ]Left [x]Bilateral    CARDIOVASCULAR:    [ ]Regular [x]Irregular [x ]Tachy  [ ]Hung [ ]Murmur [ ]Other  GASTROINTESTINAL:  [x]Soft  [ ]Distended   [x]+BS  [x]Non tender [ ]Tender  [ ]PEG [ ]OGT/ NGT   Last BM: 2/12/2020  fecal incontinence  GENITOURINARY:  [ ]Normal [x] Incontinent   [ ]Oliguria/Anuria   [x]Siddiqi  MUSCULOSKELETAL:   [ ]Normal   [x]Weakness  [x]Bed/Wheelchair bound [ ]Edema  NEUROLOGIC:   [ ]No focal deficits  [x] Cognitive impairment  [x] Dysphagia [x]Dysarthria [x] Paresis [ ]Other   SKIN:   [x]Normal  [ ]Rash     [ ]Pressure ulcer(s)  [ ]y [ ]n  Present on admission  [x]sialidinits present (swollen, tender and with erythema)    CRITICAL CARE:  [ ] Shock Present  [ ]Septic [ ]Cardiogenic [ ]Neurologic [ ]Hypovolemic  [ ]  Vasopressors [ ]  Inotropes   [ ] Respiratory failure present [ ] Mechanical Ventilation [ ] Non-invasive ventilatory support [ ] High-Flow  [ ] Acute  [ ] Chronic [ ] Hypoxic  [ ] Hypercarbic [ ] Other  [x ] Other organ failure; brain    LABS: None     RADIOLOGY & ADDITIONAL STUDIES: None     PROTEIN CALORIE MALNUTRITION: severe  https://www.andeal.org/vault/2440/web/files/ONC/Table_Clinical%20Characteristics%20to%20Document%20Malnutrition-White%20JV%20et%20al%427738.pdf    Height (cm): 152.4 (01-23-20 @ 13:10)  Weight (kg): 74 (01-23-20 @ 13:10)  BMI (kg/m2): 31.9 (01-23-20 @ 13:10)      PROTEIN CALORIE MALNUTRITION:   [x ] PPSV2 < or = 30% [ ] significant weight loss [ x] poor nutritional intake [ ] anasarca [ ] catabolic state   Prealbumin, Serum: 14 mg/dL (01-30-20 @ 09:34)   Artificial Nutrition [ ]     REFERRALS:   [ ]Chaplaincy  [ ] Hospice  [ ]Child Life  [ x]Social Work  [ ]Case management [ ]Holistic Therapy [ ] Physical Therapy [ ] Dietary   Care Coordination Document: Progress Notes - Care Coordination [C. Provider] (02-10-20 @ 13:57)                                       Progress Notes    PROGRESS NOTE  Date & Time of Note   2020-02-10 01:43    Notes    Notes: Palliative Care :      LMSW was in communication with patient's son regarding setting up a family  meeting to discuss goals of care and discharge planning. Patient's son reported  that they could be here on Wed. 2/12/20 at 11am. LMSW confirmed date and time.  LMSW informed medical team of above. Social Work will continue to remain  available and collaborate with interdisciplinary team.       Electronically signed by:  Acacia Olson LMSW  Electronically signed on:  2020-02-10  13:57        Goals of Care Document: GAP TEAM PALLIATIVE CARE UNIT PROGRESS NOTE:      [  ] Patient on hospice program.    INDICATION FOR PALLIATIVE CARE UNIT SERVICES: Delirium in setting of acute CVA in a patient with advanced dementia, PCU admission for symptom management (agitation and dyspnea), GOC ongoing.     INTERVAL HPI/OVERNIGHT EVENTS: Morphine prn x 1 and Robinul prn x 1 in the past 24 hours. Noted to have erythema and swelling more pronounced on right side of neck suspicious for sialidinitis likely 2/2 to increased purulent secretions. Doxycycline 100 mg ordered IVSS q 12 hours and Morphine 0.5 mg ordered ATC as patient appeared uncomfortable on assessment. PRN Morphine increased to 1 mg. Awaiting family arrival to discuss possible transition to inpatient hospice facility.    DNR on chart: Yes    Allergies    Allergy Status Unknown    Intolerances    MEDICATIONS  (STANDING):  cloNIDine Patch 0.1 mG/24Hr(s) 1 patch Transdermal every 7 days  doxycycline IVPB 100 milliGRAM(s) IV Intermittent every 12 hours  hydrALAZINE Injectable 10 milliGRAM(s) IV Push every 6 hours  levothyroxine Injectable 62.5 MICROGram(s) IV Push at bedtime  morphine  - Injectable 0.5 milliGRAM(s) IV Push every 6 hours  valproate sodium IVPB 500 milliGRAM(s) IV Intermittent two times a day    MEDICATIONS  (PRN):  acetaminophen  Suppository .. 650 milliGRAM(s) Rectal every 6 hours PRN Mild Pain (1 - 3)  glycopyrrolate Injectable 0.4 milliGRAM(s) IV Push every 4 hours PRN oropharyngeal secretions.  LORazepam   Injectable 0.5 milliGRAM(s) IV Push every 1 hour PRN Agitation/Anxiety  morphine  - Injectable 1 milliGRAM(s) IV Push every 1 hour PRN Pain  morphine  - Injectable 1 milliGRAM(s) IV Push every 1 hour PRN Dyspnea    ITEMS UNCHECKED ARE NOT PRESENT    PRESENT SYMPTOMS: [ x]Unable to obtain due to poor mentation   Source if other than patient:  [ ]Family   [ ]Team     Pain: [ ] yes [] no  QOL impact -   Location -                    Aggravating factors -  Quality -  Radiation -  Timing-  Severity (0-10 scale):  Minimal acceptable level (0-10 scale):     Dyspnea:                           [ ]Mild [ ]Moderate [ ]Severe  Anxiety:                             [ ]Mild []Moderate [ ]Severe  Fatigue:                             [ ]Mild [ ]Moderate [ ]Severe  Nausea:                             [ ]Mild [ ]Moderate [ ]Severe  Loss of appetite:              [ ]Mild []Moderate [ ]Severe  Constipation:                    [ ]Mild [ ]Moderate [ ]Severe    PAINAD Score: 4    [email or web address]/cog/painad.pdf (Ctrl +  left click to view)  		  Other Symptoms:   []All other review of systems negative     Palliative Performance Status Version 2:   10%         [email or web address]      PHYSICAL EXAM:  Vital Signs Last 24 Hrs  T(C): 36.4 (12 Feb 2020 09:01), Max: 36.4 (12 Feb 2020 09:01)  T(F): 97.5 (12 Feb 2020 09:01), Max: 97.5 (12 Feb 2020 09:01)  HR: 86 (12 Feb 2020 09:01) (86 - 97)  BP: 145/70 (12 Feb 2020 09:01) (126/74 - 154/69)  BP(mean): --  RR: 20 (12 Feb 2020 09:01) (20 - 20)  SpO2: 98% (12 Feb 2020 09:01) (98% - 98%)    09 Feb 2020 07:01  -  10 Feb 2020 07:00  --------------------------------------------------------  IN: 0 mL / OUT: 200 mL / NET: -200 mL      GENERAL: fragile elderly female  [ ]Alert  [ ]Oriented x   [x]Lethargic  [ ]Cachexia  [x]Unarousable  [ ]Verbal  [x ]Non-Verbal  Behavioral:   [ ] Anxiety  [ ] Delirium [x] Agitation-occasional [ ] Other  HEENT: submandibular erythema/swelling, hard, tender to touch  [ ]Normal   [x]Dry mouth   [ ]ET Tube/Trach  [ ]Oral lesions  PULMONARY:   [ ]Clear []Tachypnea  [x]Audible excessive secretions (purulent)  [ x]Rhonchi        [ ]Right [ ]Left [x ]Bilateral  [ ]Crackles        [ ]Right [ ]Left [ ]Bilateral  [ ]Wheezing     [ ]Right [ ]Left [ ]Bilateral  [x]Diminshed BS [ ]Right [ ]Left [x]Bilateral    CARDIOVASCULAR:    [ ]Regular [x]Irregular [x ]Tachy  [ ]Hung [ ]Murmur [ ]Other  GASTROINTESTINAL:  [x]Soft  [ ]Distended   [x]+BS  [x]Non tender [ ]Tender  [ ]PEG [ ]OGT/ NGT   Last BM: 2/12/2020  fecal incontinence  GENITOURINARY:  [ ]Normal [x] Incontinent   [ ]Oliguria/Anuria   [x]Siddiqi  MUSCULOSKELETAL:   [ ]Normal   [x]Weakness  [x]Bed/Wheelchair bound [ ]Edema  NEUROLOGIC:   [ ]No focal deficits  [x] Cognitive impairment  [x] Dysphagia [x]Dysarthria [x] Paresis [ ]Other   SKIN:   [x]Normal  [ ]Rash     [ ]Pressure ulcer(s)  [ ]y [ ]n  Present on admission  [x]sialidinits present (swollen, tender and with erythema)    CRITICAL CARE:  [ ] Shock Present  [ ]Septic [ ]Cardiogenic [ ]Neurologic [ ]Hypovolemic  [ ]  Vasopressors [ ]  Inotropes   [ ] Respiratory failure present [ ] Mechanical Ventilation [ ] Non-invasive ventilatory support [ ] High-Flow  [ ] Acute  [ ] Chronic [ ] Hypoxic  [ ] Hypercarbic [ ] Other  [x ] Other organ failure; brain    LABS: None     RADIOLOGY & ADDITIONAL STUDIES: None     PROTEIN CALORIE MALNUTRITION: severe  https://[email or web address]/[email or web address]/web/files/ONC/Table_Clinical%20Characteristics%20to%20Document%20Malnutrition-White%20JV%20et%20al%202012.pdf    Height (cm): 152.4 (01-23-20 @ 13:10)  Weight (kg): 74 (01-23-20 @ 13:10)  BMI (kg/m2): 31.9 (01-23-20 @ 13:10)      PROTEIN CALORIE MALNUTRITION:   [x ] PPSV2 < or = 30% [ ] significant weight loss [ x] poor nutritional intake [ ] anasarca [ ] catabolic state   Prealbumin, Serum: 14 mg/dL (01-30-20 @ 09:34)   Artificial Nutrition [ ]     REFERRALS:   [ ]Chaplaincy  [ ] Hospice  [ ]Child Life  [ x]Social Work  [ ]Case management [ ]Holistic Therapy [ ] Physical Therapy [ ] Dietary   Care Coordination Document: Progress Notes - Care Coordination [C. Provider] (02-10-20 @ 13:57)                                       Progress Notes    PROGRESS NOTE  Date & Time of Note   2020-02-10 01:43    Notes    Notes: Palliative Care :      LMSW was in communication with patient's son regarding setting up a family  meeting to discuss goals of care and discharge planning. Patient's son reported  that they could be here on Wed. 2/12/20 at 11am. LMSW confirmed date and time.  LMSW informed medical team of above. Social Work will continue to remain  available and collaborate with interdisciplinary team.       Electronically signed by:  Acacia Olson LMSW  Electronically signed on:  2020-02-10  13:57        Goals of Care Document:

## 2020-02-12 NOTE — PROGRESS NOTE ADULT - PROBLEM SELECTOR PLAN 7
Continued GOC discussions with family. Planned family meeting for tomorrow 2/12/20 at 11 am. MOLST with DNR/DNI present in the chart. Continued GOC discussions with family. Patient a good candidate for an inpatient hospice transition. Family to arrive later today and will discuss. Symptoms being actively managed with Morphine ATC and prn and Ativan prn.     MOLST in chart with DNR/DNI.

## 2020-02-12 NOTE — PROGRESS NOTE ADULT - ATTENDING COMMENTS
Patient seen and examined and agree with the above documentation with the following additions:   Patient now with erythema, induration in submandibular region consistent with salivary gland obstruction- sialadenitis vs. parotidis. Patient with increasing pain/agitation to palpation of area. Will change pain management to ATC morphine. Add warm compresses prn for pain. will avoid antimuscarinic meds as likely to exacerbation. doxycycline BID started for management as well. Vital signs stable, however, appears to be in dying process. Awaiting family arrival to discuss potential for inpatient hospice.

## 2020-02-12 NOTE — PROGRESS NOTE ADULT - PROBLEM SELECTOR PLAN 5
PPSV2 10%. needs nursing care with all ADL's. Bedbound. Rate controlled.  Not a candidate for anticoagulation 2/2 to IVH. Focus on symptom management.

## 2020-02-12 NOTE — PROGRESS NOTE ADULT - PROBLEM SELECTOR PLAN 2
Acute LMCA ischemic stroke with hemorrhagic conversion. No interventions offered. Acute LMCA ischemic stroke with hemorrhagic conversion. No interventions offered. Poor neurological exam.

## 2020-02-12 NOTE — PROGRESS NOTE ADULT - PROBLEM SELECTOR PLAN 1
Likely related to acute CVA, and prior history of advanced dementia, prior CVAs. Overall, minimally to unresponsive at this time with intermittent periods of agitation. Agitation fairly well controlled with Haldol 0.5 mg IV q 8hr ATC.    -C/w Haldol 0.5 mg IV q8hr ATC  -Ativan 0.5 mg IV q1hr prn refractory agitation  - Remains on Depacon IV for seizure prophylaxis Likely related to acute CVA, and prior history of advanced dementia, prior CVAs. Overall, minimally to unresponsive at this time,    Morphine ordered ATC for pain/dyspnea. Haldol ATC discontinued and Ativan 0.5 mg IV remains in place prn.  Depacon 500 mg IVSS q 12 hours for seizure prophylaxis.       - Likely related to acute CVA, and prior history of advanced dementia, prior CVAs. Overall, minimally to unresponsive at this time    Morphine ordered ATC for pain/dyspnea. Haldol ATC discontinued and Ativan 0.5 mg IV remains in place prn.  Depacon 500 mg IVSS q 12 hours for seizure prophylaxis.

## 2020-02-12 NOTE — PROGRESS NOTE ADULT - PROBLEM SELECTOR PLAN 3
Progressive Dysphagia this hospital course likely exacerbated by AMS and acute CVA, failed speech and swallow eval 2/3.   Current GOC: HCP does not want PEG, which are aligned with patient's goals. Progressive Dysphagia this hospital course likely exacerbated by AMS and acute CVA, failed speech and swallow eval 2/3.   Current GOC: HCP does not want PEG, which are aligned with patient's goals.  Patient unable to take anything by mouth at this time. C/w mouth care and management of oral secretions.

## 2020-02-12 NOTE — PROGRESS NOTE ADULT - PROBLEM SELECTOR PLAN 4
Rate controlled.  Not a candidate for anticoagulation. Focus on symptom management. Patient with erythema and swelling to right side of neck. Appears to be uncomfortable with palpation. Likely 2/2 to purulent secretions. Doxycyline 100 mg IVSS ordered q 12 hours Patient with erythema and swelling to right side of neck. Appears to be uncomfortable with palpation. Likely 2/2 to purulent secretions. Doxycyline 100 mg IVSS ordered q 12 hours. Morphine 0.5 mg ordered q 6 ATC for pain and prn dose increased to 1 mg. Will evaluate effectiveness. Robinul to be discontinued at this time. Patient with erythema and swelling to right side of neck, submandibular distribution. Appears to be uncomfortable with palpation. Likely 2/2 to purulent secretions. Doxycyline 100 mg IVSS ordered q 12 hours. Morphine 0.5 mg ordered q 6 ATC for pain and prn dose increased to 1 mg. Will evaluate effectiveness. Robinul to be discontinued at this time as antimuscarinic effect likely to exacerbate  -warm compress PRN for erythema and induration

## 2020-02-12 NOTE — PROGRESS NOTE ADULT - ASSESSMENT
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 Patient was transferred to PCU for symptom management, mainly agitation and anticipatory management of symptoms. Patient continues to decline, no PO intake, and requiring Haldol ATC for control of agitation. Family to decide if they would like to pursue a Hospice Referral Family meeting scheduled and planned for Wednesday 2/12/20 at 11 am.    Morphine prn x 1 in the past 24 hours. Patient remains with wet cough. Robinul in place prn. Unable to tolerate anything by mouth at this time. 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 Patient was transferred to PCU for symptom management, mainly agitation and anticipatory management of symptoms. Patient continues to decline, no PO intake, and requiring Haldol ATC for control of agitation. Family to decide if they would like to pursue a Hospice Referral Family meeting scheduled and planned for Wednesday 2/12/20 at 11 am.    Morphine prn x 1 and Robinul prn x 1 in the past 24 hours. Patient with erythema, swelling and tenderness to neck which is likely sialadinitis. Doxycyline 100 mg IVSS ordered q 12 hours. Appears uncomfortable. Morphine 0.5 mg ordered ATC to manage current symptoms and prn dose increased to 1 mg.

## 2020-02-13 NOTE — PROGRESS NOTE ADULT - PROBLEM SELECTOR PLAN 4
c/w Doxycyline 100 mg IVSS ordered q 12 hours.  Morphine 0.5 mg ordered q 6 ATC for pain and prn dose increased to 1 mg on 2/12. warm compress PRN  appears more comfortable on exam today.  avoiding antimuscarinics at this time

## 2020-02-13 NOTE — PROGRESS NOTE ADULT - PROBLEM SELECTOR PLAN 7
family not present at 11 am on 2/12 for discussion  HCN referral made for evaluation for inpatient hospice.  vital signs stable at this time, comfortable on examination. continue care in PCU currently.

## 2020-02-13 NOTE — PROGRESS NOTE ADULT - PROBLEM SELECTOR PLAN 1
Likely related to acute CVA, and prior history of advanced dementia, prior CVAs. Overall, minimally to unresponsive at this time  No requirements of IV haldol or ativan over last 24 hours.  Morphine ordered ATC for pain/dyspnea. Haldol ATC discontinued on 2/12 and Ativan 0.5 mg IV remains available PRN  Depacon 500 mg IVSS q 12 hours for seizure prophylaxis.

## 2020-02-13 NOTE — PROGRESS NOTE ADULT - PROBLEM SELECTOR PLAN 3
Progressive Dysphagia this hospital course likely exacerbated by AMS and acute CVA, failed speech and swallow eval 2/3.    HCP does not want PEG  oral care

## 2020-02-13 NOTE — PROGRESS NOTE ADULT - SUBJECTIVE AND OBJECTIVE BOX
GAP TEAM PALLIATIVE CARE UNIT PROGRESS NOTE:      [  ] Patient on hospice program.    INDICATION FOR PALLIATIVE CARE UNIT SERVICES:    INTERVAL HPI/OVERNIGHT EVENTS:    DNR on chart: Yes    Allergies    Allergy Status Unknown    Intolerances    MEDICATIONS  (STANDING):  cloNIDine Patch 0.1 mG/24Hr(s) 1 patch Transdermal every 7 days  doxycycline IVPB 100 milliGRAM(s) IV Intermittent every 12 hours  hydrALAZINE Injectable 10 milliGRAM(s) IV Push every 6 hours  levothyroxine Injectable 62.5 MICROGram(s) IV Push at bedtime  morphine  - Injectable 0.5 milliGRAM(s) IV Push every 6 hours  valproate sodium IVPB 500 milliGRAM(s) IV Intermittent two times a day    MEDICATIONS  (PRN):  acetaminophen  Suppository .. 650 milliGRAM(s) Rectal every 6 hours PRN Mild Pain (1 - 3)  LORazepam   Injectable 0.5 milliGRAM(s) IV Push every 1 hour PRN Agitation/Anxiety  morphine  - Injectable 1 milliGRAM(s) IV Push every 1 hour PRN Pain  morphine  - Injectable 1 milliGRAM(s) IV Push every 1 hour PRN Dyspnea    ITEMS UNCHECKED ARE NOT PRESENT    PRESENT SYMPTOMS: [ ]Unable to obtain due to poor mentation   Source if other than patient:  [ ]Family   [ ]Team     Pain: [ ] yes [ ] no  QOL impact -   Location -                    Aggravating factors -  Quality -  Radiation -  Timing-  Severity (0-10 scale):  Minimal acceptable level (0-10 scale):     Dyspnea:                           [ ]Mild [ ]Moderate [ ]Severe  Anxiety:                             [ ]Mild [ ]Moderate [ ]Severe  Fatigue:                             [ ]Mild [ ]Moderate [ ]Severe  Nausea:                             [ ]Mild [ ]Moderate [ ]Severe  Loss of appetite:              [ ]Mild [ ]Moderate [ ]Severe  Constipation:                    [ ]Mild [ ]Moderate [ ]Severe    PAINAD Score:    http://geriatrictoolkit.[place].[facility]/cog/painad.pdf (Ctrl +  left click to view)  		  Other Symptoms:  [ ]All other review of systems negative     Palliative Performance Status Version 2:         %         http://npcrc.org/files/news/palliative_performance_scale_ppsv2.pdf  PHYSICAL EXAM:  Vital Signs Last 24 Hrs  T(C): 36.7 (13 Feb 2020 08:25), Max: 36.7 (13 Feb 2020 08:25)  T(F): 98.1 (13 Feb 2020 08:25), Max: 98.1 (13 Feb 2020 08:25)  HR: 95 (13 Feb 2020 08:25) (95 - 98)  BP: 124/73 (13 Feb 2020 08:25) (124/73 - 141/70)  BP(mean): --  RR: 20 (13 Feb 2020 08:25) (20 - 20)  SpO2: 91% (13 Feb 2020 08:25) (91% - 91%) I&O's Summary    12 Feb 2020 07:01  -  13 Feb 2020 07:00  --------------------------------------------------------  IN: 0 mL / OUT: 150 mL / NET: -150 mL    GENERAL:  [ ]Alert  [ ]Oriented x   [ ]Lethargic  [ ]Cachexia  [ ]Unarousable  [ ]Verbal  [ ]Non-Verbal  Behavioral:   [ ] Anxiety  [ ] Delirium [ ] Agitation [ ] Other  HEENT:  [ ]Normal   [ ]Dry mouth   [ ]ET Tube/Trach  [ ]Oral lesions  PULMONARY:   [ ]Clear [ ]Tachypnea  [ ]Audible excessive secretions   [ ]Rhonchi        [ ]Right [ ]Left [ ]Bilateral  [ ]Crackles        [ ]Right [ ]Left [ ]Bilateral  [ ]Wheezing     [ ]Right [ ]Left [ ]Bilateral  [ ]Diminshed BS [ ]Right [ ]Left [ ]Bilateral    CARDIOVASCULAR:    [ ]Regular [ ]Irregular [ ]Tachy  [ ]Hung [ ]Murmur [ ]Other  GASTROINTESTINAL:  [ ]Soft  [ ]Distended   [ ]+BS  [ ]Non tender [ ]Tender  [ ]PEG [ ]OGT/ NGT   Last BM:       GENITOURINARY:  [ ]Normal [ ] Incontinent   [ ]Oliguria/Anuria   [ ]Siddiqi  MUSCULOSKELETAL:   [ ]Normal   [ ]Weakness  [ ]Bed/Wheelchair bound [ ]Edema  NEUROLOGIC:   [ ]No focal deficits  [ ] Cognitive impairment  [ ] Dysphagia [ ]Dysarthria [ ] Paresis [ ]Other   SKIN:   [ ]Normal  [ ]Rash     [ ]Pressure ulcer(s)  [ ]y [ ]n  Present on admission      CRITICAL CARE:  [ ] Shock Present  [ ]Septic [ ]Cardiogenic [ ]Neurologic [ ]Hypovolemic  [ ]  Vasopressors [ ]  Inotropes   [ ] Respiratory failure present [ ] Mechanical Ventilation [ ] Non-invasive ventilatory support [ ] High-Flow  [ ] Acute  [ ] Chronic [ ] Hypoxic  [ ] Hypercarbic [ ] Other  [ ] Other organ failure     LABS:            RADIOLOGY & ADDITIONAL STUDIES:    PROTEIN CALORIE MALNUTRITION: [ ] mild [ ] moderate [ ] severe  [ ] underweight [ ] morbid obesity    https://www.andeal.org/vault/2440/web/files/ONC/Table_Clinical%20Characteristics%20to%20Document%20Malnutrition-White%20JV%20et%20al%202012.pdf    Height (cm): 152.4 (01-23-20 @ 13:10)  Weight (kg): 74 (01-23-20 @ 13:10)  BMI (kg/m2): 31.9 (01-23-20 @ 13:10)    [ ] PPSV2 < or = 30% [ ] significant weight loss [ ] poor nutritional intake [ ] anasarca Prealbumin, Serum: 14 mg/dL (01-30-20 @ 09:34)    Artificial Nutrition [ ]     REFERRALS:   [ ]Chaplaincy  [ ] Hospice  [ ]Child Life  [ ]Social Work  [ ]Case management [ ]Holistic Therapy [ ] Physical Therapy [ ] Dietary   Goals of Care Document: GAP TEAM PALLIATIVE CARE UNIT PROGRESS NOTE:      [  ] Patient on hospice program.    INDICATION FOR PALLIATIVE CARE UNIT SERVICES: Management of agitation, dyspnea at end of life    INTERVAL HPI/OVERNIGHT EVENTS: no acute overnight events, no PRNs required over last 24 hours. Sumbandibular induration improved.    DNR on chart: Yes    Allergies    Allergy Status Unknown    Intolerances    MEDICATIONS  (STANDING):  cloNIDine Patch 0.1 mG/24Hr(s) 1 patch Transdermal every 7 days  doxycycline IVPB 100 milliGRAM(s) IV Intermittent every 12 hours  hydrALAZINE Injectable 10 milliGRAM(s) IV Push every 6 hours  levothyroxine Injectable 62.5 MICROGram(s) IV Push at bedtime  morphine  - Injectable 0.5 milliGRAM(s) IV Push every 6 hours  valproate sodium IVPB 500 milliGRAM(s) IV Intermittent two times a day    MEDICATIONS  (PRN):  acetaminophen  Suppository .. 650 milliGRAM(s) Rectal every 6 hours PRN Mild Pain (1 - 3)  LORazepam   Injectable 0.5 milliGRAM(s) IV Push every 1 hour PRN Agitation/Anxiety  morphine  - Injectable 1 milliGRAM(s) IV Push every 1 hour PRN Pain  morphine  - Injectable 1 milliGRAM(s) IV Push every 1 hour PRN Dyspnea    ITEMS UNCHECKED ARE NOT PRESENT    PRESENT SYMPTOMS: [x ]Unable to obtain due to poor mentation   Source if other than patient:  [ ]Family   [ ]Team     Pain: [ ] yes [ ] no  QOL impact -   Location -                    Aggravating factors -  Quality -  Radiation -  Timing-  Severity (0-10 scale):  Minimal acceptable level (0-10 scale):     Dyspnea:                           [ ]Mild [ ]Moderate [ ]Severe  Anxiety:                             [ ]Mild [ ]Moderate [ ]Severe  Fatigue:                             [ ]Mild [ ]Moderate [ ]Severe  Nausea:                             [ ]Mild [ ]Moderate [ ]Severe  Loss of appetite:              [ ]Mild [ ]Moderate [ ]Severe  Constipation:                    [ ]Mild [ ]Moderate [ ]Severe    PAINAD Score:0    http://geriatrictoolkit.[place].edu/cog/painad.pdf (Ctrl +  left click to view)  		  Other Symptoms:  [ ]All other review of systems negative     Palliative Performance Status Version 2:     10    %         http://npcrc.org/files/news/palliative_performance_scale_ppsv2.pdf  PHYSICAL EXAM:  Vital Signs Last 24 Hrs  T(C): 36.7 (13 Feb 2020 08:25), Max: 36.7 (13 Feb 2020 08:25)  T(F): 98.1 (13 Feb 2020 08:25), Max: 98.1 (13 Feb 2020 08:25)  HR: 95 (13 Feb 2020 08:25) (95 - 98)  BP: 124/73 (13 Feb 2020 08:25) (124/73 - 141/70)  BP(mean): --  RR: 20 (13 Feb 2020 08:25) (20 - 20)  SpO2: 91% (13 Feb 2020 08:25) (91% - 91%) I&O's Summary    12 Feb 2020 07:01  -  13 Feb 2020 07:00  --------------------------------------------------------  IN: 0 mL / OUT: 150 mL / NET: -150 mL    GENERAL:  [ ]Alert  [ ]Oriented x   [ ]Lethargic  [ ]Cachexia  [x ]Unarousable  [ ]Verbal  [ ]Non-Verbal  Behavioral:   [ ] Anxiety  [ ] Delirium [ ] Agitation [ ] Other  HEENT:  [ ]Normal   [x ]Dry mouth   [ ]ET Tube/Trach  [ ]Oral lesions  PULMONARY:   [ ]Clear [ ]Tachypnea  [x ]Audible excessive secretions   [x ]Rhonchi        [ ]Right [ ]Left [ ]Bilateral  [ ]Crackles        [ ]Right [ ]Left [ ]Bilateral  [ ]Wheezing     [ ]Right [ ]Left [ ]Bilateral  [ ]Diminshed BS [ ]Right [ ]Left [ ]Bilateral    CARDIOVASCULAR:    [ x]Regular [ ]Irregular [ ]Tachy  [ ]Hung [ ]Murmur [ ]Other  GASTROINTESTINAL:  [x ]Soft  [ ]Distended   [x ]+BS  [x ]Non tender [ ]Tender  [ ]PEG [ ]OGT/ NGT   Last BM:     GENITOURINARY:  [ ]Normal [ ] Incontinent   [ ]Oliguria/Anuria   [ x]Siddiqi  MUSCULOSKELETAL:   [ ]Normal   [ ]Weakness  [x ]Bed/Wheelchair bound [ ]Edema  NEUROLOGIC: unresponsive  [ ]No focal deficits  [ ] Cognitive impairment  [ ] Dysphagia [ ]Dysarthria [ ] Paresis [ ]Other   SKIN: +induration and erythema to submandibular area on R  [ ]Normal  [ ]Rash     [ ]Pressure ulcer(s)  [ ]y [ ]n  Present on admission      CRITICAL CARE:  [ ] Shock Present  [ ]Septic [ ]Cardiogenic [ ]Neurologic [ ]Hypovolemic  [ ]  Vasopressors [ ]  Inotropes   [ ] Respiratory failure present [ ] Mechanical Ventilation [ ] Non-invasive ventilatory support [ ] High-Flow  [ ] Acute  [ ] Chronic [ ] Hypoxic  [ ] Hypercarbic [ ] Other  [ ] Other organ failure     LABS: no new labs      RADIOLOGY & ADDITIONAL STUDIES: no new imaging    PROTEIN CALORIE MALNUTRITION: [ ] mild [ x] moderate [ ] severe  [ ] underweight [ ] morbid obesity    https://www.andeal.org/vault/2440/web/files/ONC/Table_Clinical%20Characteristics%20to%20Document%20Malnutrition-White%20JV%20et%20al%931694.pdf    Height (cm): 152.4 (01-23-20 @ 13:10)  Weight (kg): 74 (01-23-20 @ 13:10)  BMI (kg/m2): 31.9 (01-23-20 @ 13:10)    [x ] PPSV2 < or = 30% [ ] significant weight loss [ x] poor nutritional intake [ ] anasarca Prealbumin, Serum: 14 mg/dL (01-30-20 @ 09:34)    Artificial Nutrition [ ]     REFERRALS:   [ ]Chaplaincy  [x ] Hospice  [ ]Child Life  [x ]Social Work  [ ]Case management [ ]Holistic Therapy [ ] Physical Therapy [ ] Dietary   Goals of Care Document: GAP TEAM PALLIATIVE CARE UNIT PROGRESS NOTE:      [  ] Patient on hospice program.    INDICATION FOR PALLIATIVE CARE UNIT SERVICES: Management of agitation, dyspnea at end of life    INTERVAL HPI/OVERNIGHT EVENTS: no acute overnight events, no PRNs required over last 24 hours. Sumbandibular induration improved.    DNR on chart: Yes    Allergies    Allergy Status Unknown    Intolerances    MEDICATIONS  (STANDING):  cloNIDine Patch 0.1 mG/24Hr(s) 1 patch Transdermal every 7 days  doxycycline IVPB 100 milliGRAM(s) IV Intermittent every 12 hours  hydrALAZINE Injectable 10 milliGRAM(s) IV Push every 6 hours  levothyroxine Injectable 62.5 MICROGram(s) IV Push at bedtime  morphine  - Injectable 0.5 milliGRAM(s) IV Push every 6 hours  valproate sodium IVPB 500 milliGRAM(s) IV Intermittent two times a day    MEDICATIONS  (PRN):  acetaminophen  Suppository .. 650 milliGRAM(s) Rectal every 6 hours PRN Mild Pain (1 - 3)  LORazepam   Injectable 0.5 milliGRAM(s) IV Push every 1 hour PRN Agitation/Anxiety  morphine  - Injectable 1 milliGRAM(s) IV Push every 1 hour PRN Pain  morphine  - Injectable 1 milliGRAM(s) IV Push every 1 hour PRN Dyspnea    ITEMS UNCHECKED ARE NOT PRESENT    PRESENT SYMPTOMS: [x ]Unable to obtain due to poor mentation   Source if other than patient:  [ ]Family   [ ]Team     Pain: [ ] yes [ ] no  QOL impact -   Location -                    Aggravating factors -  Quality -  Radiation -  Timing-  Severity (0-10 scale):  Minimal acceptable level (0-10 scale):     Dyspnea:                           [ ]Mild [ ]Moderate [ ]Severe  Anxiety:                             [ ]Mild [ ]Moderate [ ]Severe  Fatigue:                             [ ]Mild [ ]Moderate [ ]Severe  Nausea:                             [ ]Mild [ ]Moderate [ ]Severe  Loss of appetite:              [ ]Mild [ ]Moderate [ ]Severe  Constipation:                    [ ]Mild [ ]Moderate [ ]Severe    PAINAD Score:0    http://geriatrictoolkit.[place].edu/cog/painad.pdf (Ctrl +  left click to view)  		  Other Symptoms:  [ ]All other review of systems negative     Palliative Performance Status Version 2:     10    %         http://npcrc.org/files/news/palliative_performance_scale_ppsv2.pdf  PHYSICAL EXAM:  Vital Signs Last 24 Hrs  T(C): 36.7 (13 Feb 2020 08:25), Max: 36.7 (13 Feb 2020 08:25)  T(F): 98.1 (13 Feb 2020 08:25), Max: 98.1 (13 Feb 2020 08:25)  HR: 95 (13 Feb 2020 08:25) (95 - 98)  BP: 124/73 (13 Feb 2020 08:25) (124/73 - 141/70)  BP(mean): --  RR: 20 (13 Feb 2020 08:25) (20 - 20)  SpO2: 91% (13 Feb 2020 08:25) (91% - 91%) I&O's Summary    12 Feb 2020 07:01  -  13 Feb 2020 07:00  --------------------------------------------------------  IN: 0 mL / OUT: 150 mL / NET: -150 mL    GENERAL:  [ ]Alert  [ ]Oriented x   [ ]Lethargic  [ ]Cachexia  [x ]Unarousable  [ ]Verbal  [ ]Non-Verbal  Behavioral:   [ ] Anxiety  [ ] Delirium [ ] Agitation [ ] Other  HEENT:  [ ]Normal   [x ]Dry mouth   [ ]ET Tube/Trach  [ ]Oral lesions  PULMONARY:   [ ]Clear [ ]Tachypnea  [x ]Audible excessive secretions   [x ]Rhonchi        [ ]Right [ ]Left [ ]Bilateral  [ ]Crackles        [ ]Right [ ]Left [ ]Bilateral  [ ]Wheezing     [ ]Right [ ]Left [ ]Bilateral  [ ]Diminshed BS [ ]Right [ ]Left [ ]Bilateral    CARDIOVASCULAR:    [ x]Regular [ ]Irregular [ ]Tachy  [ ]Hung [ ]Murmur [ ]Other  GASTROINTESTINAL:  [x ]Soft  [ ]Distended   [x ]+BS  [x ]Non tender [ ]Tender  [ ]PEG [ ]OGT/ NGT   Last BM:   2/12  GENITOURINARY:  [ ]Normal [ ] Incontinent   [ ]Oliguria/Anuria   [ x]Siddiqi  MUSCULOSKELETAL:   [ ]Normal   [ ]Weakness  [x ]Bed/Wheelchair bound [ ]Edema  NEUROLOGIC: unresponsive  [ ]No focal deficits  [ ] Cognitive impairment  [ ] Dysphagia [ ]Dysarthria [ ] Paresis [ ]Other   SKIN: +induration and erythema to submandibular area on R  [ ]Normal  [ ]Rash     [ ]Pressure ulcer(s)  [ ]y [ ]n  Present on admission      CRITICAL CARE:  [ ] Shock Present  [ ]Septic [ ]Cardiogenic [ ]Neurologic [ ]Hypovolemic  [ ]  Vasopressors [ ]  Inotropes   [ ] Respiratory failure present [ ] Mechanical Ventilation [ ] Non-invasive ventilatory support [ ] High-Flow  [ ] Acute  [ ] Chronic [ ] Hypoxic  [ ] Hypercarbic [ ] Other  [ ] Other organ failure     LABS: no new labs      RADIOLOGY & ADDITIONAL STUDIES: no new imaging    PROTEIN CALORIE MALNUTRITION: [ ] mild [ x] moderate [ ] severe  [ ] underweight [ ] morbid obesity    https://www.andeal.org/vault/2440/web/files/ONC/Table_Clinical%20Characteristics%20to%20Document%20Malnutrition-White%20JV%20et%20al%587740.pdf    Height (cm): 152.4 (01-23-20 @ 13:10)  Weight (kg): 74 (01-23-20 @ 13:10)  BMI (kg/m2): 31.9 (01-23-20 @ 13:10)    [x ] PPSV2 < or = 30% [ ] significant weight loss [ x] poor nutritional intake [ ] anasarca Prealbumin, Serum: 14 mg/dL (01-30-20 @ 09:34)    Artificial Nutrition [ ]     REFERRALS:   [ ]Chaplaincy  [x ] Hospice  [ ]Child Life  [x ]Social Work  [ ]Case management [ ]Holistic Therapy [ ] Physical Therapy [ ] Dietary   Goals of Care Document:

## 2020-02-13 NOTE — PROGRESS NOTE ADULT - PROBLEM SELECTOR PLAN 2
Acute LMCA ischemic stroke with hemorrhagic conversion. No interventions offered. Poor neurological exam.

## 2020-02-13 NOTE — PROGRESS NOTE ADULT - ASSESSMENT
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 Patient was transferred to PCU for symptom management, mainly agitation and anticipatory management of symptoms. Patient continues to decline, no PO intake, and requiring Haldol ATC for control of agitation. Family to decide if they would like to pursue a Hospice Referral

## 2020-02-14 NOTE — PROGRESS NOTE ADULT - PROBLEM SELECTOR PLAN 6
PPSV2 10%. requires total care Meeting with son, daughter, and SW Isaías Calvo today. Supportive care given. The children feel they are doing what is in line with the patient's wishes, however, feel overwhelmed by patient's prolonged dying process.     Plan to c/w antibiotics and managing symptoms and will re-discuss inpatient transition next week given patient's symptoms have escalated over the week and she appears to be in the dying process.    MOLST with DNR/DNI and HCP present in the chart.

## 2020-02-14 NOTE — PROGRESS NOTE ADULT - PROBLEM SELECTOR PLAN 1
Likely related to acute CVA, and prior history of advanced dementia, prior CVAs. Overall, minimally to unresponsive at this time  No requirements of IV haldol or ativan over last 24 hours.  Morphine ordered ATC for pain/dyspnea. Haldol ATC discontinued on 2/12 and Ativan 0.5 mg IV remains available PRN  Depacon 500 mg IVSS q 12 hours for seizure prophylaxis. Likely related to acute CVA, and prior history of advanced dementia, prior CVAs. Unresponsive at this time, however, appears comfortable.  No prn use of Ativan in the past 24 hours.   Depacon 500 mg IVSS q 12 hours for seizure prophylaxis.

## 2020-02-14 NOTE — PROGRESS NOTE ADULT - ASSESSMENT
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 Patient was transferred to PCU for symptom management, mainly agitation and anticipatory management of symptoms. Patient continues to decline, no PO intake, and requiring Haldol ATC for control of agitation. Family to decide if they would like to pursue a Hospice Referral. 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 Patient was transferred to PCU for symptom management, mainly agitation and anticipatory management of symptoms. Patient continues to decline, no PO intake, and requiring Haldol ATC for control of agitation. Family to decide if they would like to pursue a Hospice Referral.     Remains unresponsive and appears to be in the dying process. Morphine prn x 1 in the past 24 hours. Remains on ATC Morphine. Son and daughter at bedside.

## 2020-02-14 NOTE — PROGRESS NOTE ADULT - PROBLEM SELECTOR PLAN 4
c/w Doxycyline 100 mg IVSS ordered q 12 hours.  Morphine 0.5 mg ordered q 6 ATC for pain and prn dose increased to 1 mg on 2/12. warm compress PRN  appears more comfortable on exam today.  avoiding antimuscarinics at this time c/w Doxycyline 100 mg IVSS ordered q 12 hours.  Morphine 0.5 mg ordered q 6 ATC for pain and prn dose increased to 1 mg on 2/12.   Warm compress PRN. Edema appears to be improved on exam this morning.  Robinul discontinued at this time.

## 2020-02-14 NOTE — PROGRESS NOTE ADULT - PROBLEM SELECTOR PLAN 2
Acute LMCA ischemic stroke with hemorrhagic conversion. No interventions offered. Poor neurological exam. Acute LMCA ischemic stroke with hemorrhagic conversion. No interventions offered. Poor neurological exam. Appears to be in the dying process.

## 2020-02-14 NOTE — PROGRESS NOTE ADULT - PROBLEM SELECTOR PLAN 3
Progressive Dysphagia this hospital course likely exacerbated by AMS and acute CVA, failed speech and swallow eval 2/3.    HCP does not want PEG  oral care Progressive Dysphagia this hospital course likely exacerbated by AMS and acute CVA, failed speech and swallow eval 2/3.   No plan for artifical feeding. Mouth care only at this time.

## 2020-02-14 NOTE — PROGRESS NOTE ADULT - SUBJECTIVE AND OBJECTIVE BOX
GAP TEAM PALLIATIVE CARE UNIT PROGRESS NOTE:      [  ] Patient on hospice program.    INDICATION FOR PALLIATIVE CARE UNIT SERVICES: Management of agitation, dyspnea at end of life    INTERVAL HPI/OVERNIGHT EVENTS:    DNR on chart: Yes    Allergies    Allergy Status Unknown    Intolerances    MEDICATIONS  (STANDING):  cloNIDine Patch 0.1 mG/24Hr(s) 1 patch Transdermal every 7 days  doxycycline IVPB 100 milliGRAM(s) IV Intermittent every 12 hours  hydrALAZINE Injectable 10 milliGRAM(s) IV Push every 6 hours  levothyroxine Injectable 62.5 MICROGram(s) IV Push at bedtime  morphine  - Injectable 0.5 milliGRAM(s) IV Push every 6 hours  valproate sodium IVPB 500 milliGRAM(s) IV Intermittent two times a day    MEDICATIONS  (PRN):  acetaminophen  Suppository .. 650 milliGRAM(s) Rectal every 6 hours PRN Mild Pain (1 - 3)  LORazepam   Injectable 0.5 milliGRAM(s) IV Push every 1 hour PRN Agitation/Anxiety  morphine  - Injectable 1 milliGRAM(s) IV Push every 1 hour PRN Pain  morphine  - Injectable 1 milliGRAM(s) IV Push every 1 hour PRN Dyspnea    ITEMS UNCHECKED ARE NOT PRESENT    PRESENT SYMPTOMS: [x ]Unable to obtain due to poor mentation   Source if other than patient:  [ ]Family   [ ]Team     Pain: [ ] yes [ ] no  QOL impact -   Location -                    Aggravating factors -  Quality -  Radiation -  Timing-  Severity (0-10 scale):  Minimal acceptable level (0-10 scale):     Dyspnea:                           [ ]Mild [ ]Moderate [ ]Severe  Anxiety:                             [ ]Mild [ ]Moderate [ ]Severe  Fatigue:                             [ ]Mild [ ]Moderate [ ]Severe  Nausea:                             [ ]Mild [ ]Moderate [ ]Severe  Loss of appetite:              [ ]Mild [ ]Moderate [ ]Severe  Constipation:                    [ ]Mild [ ]Moderate [ ]Severe    PAINAD Score:0    [email or web address] (Ctrl +  left click to view)  		  Other Symptoms:  [ ]All other review of systems negative     Palliative Performance Status Version 2:     10    %         http://npcrc.org/files/news/palliative_performance_scale_ppsv2.pdf    PHYSICAL EXAM:  Vital Signs Last 24 Hrs  T(C): 36.4 (14 Feb 2020 08:36), Max: 36.8 (13 Feb 2020 23:12)  T(F): 97.5 (14 Feb 2020 08:36), Max: 98.3 (13 Feb 2020 23:12)  HR: 89 (14 Feb 2020 08:36) (88 - 99)  BP: 128/74 (14 Feb 2020 08:36) (105/55 - 139/79)  BP(mean): --  RR: 20 (14 Feb 2020 08:36) (20 - 20)  SpO2: 95% (14 Feb 2020 08:36) (95% - 97%)    12 Feb 2020 07:01  -  13 Feb 2020 07:00  --------------------------------------------------------  IN: 0 mL / OUT: 150 mL / NET: -150 mL    GENERAL:  [ ]Alert  [ ]Oriented x   [ ]Lethargic  [ ]Cachexia  [x ]Unarousable  [ ]Verbal  [ ]Non-Verbal  Behavioral:   [ ] Anxiety  [ ] Delirium [ ] Agitation [ ] Other  HEENT:  [ ]Normal   [x ]Dry mouth   [ ]ET Tube/Trach  [ ]Oral lesions  PULMONARY:   [ ]Clear [ ]Tachypnea  [x ]Audible excessive secretions   [x ]Rhonchi        [ ]Right [ ]Left [ ]Bilateral  [ ]Crackles        [ ]Right [ ]Left [ ]Bilateral  [ ]Wheezing     [ ]Right [ ]Left [ ]Bilateral  [ ]Diminshed BS [ ]Right [ ]Left [ ]Bilateral    CARDIOVASCULAR:    [ x]Regular [ ]Irregular [ ]Tachy  [ ]Hung [ ]Murmur [ ]Other  GASTROINTESTINAL:  [x ]Soft  [ ]Distended   [x ]+BS  [x ]Non tender [ ]Tender  [ ]PEG [ ]OGT/ NGT   Last BM:   2/12  GENITOURINARY:  [ ]Normal [ ] Incontinent   [ ]Oliguria/Anuria   [ x]Siddiqi  MUSCULOSKELETAL:   [ ]Normal   [ ]Weakness  [x ]Bed/Wheelchair bound [ ]Edema  NEUROLOGIC: unresponsive  [ ]No focal deficits  [ ] Cognitive impairment  [ ] Dysphagia [ ]Dysarthria [ ] Paresis [ ]Other   SKIN: +induration and erythema to submandibular area on R  [ ]Normal  [ ]Rash     [ ]Pressure ulcer(s)  [ ]y [ ]n  Present on admission      CRITICAL CARE:  [ ] Shock Present  [ ]Septic [ ]Cardiogenic [ ]Neurologic [ ]Hypovolemic  [ ]  Vasopressors [ ]  Inotropes   [ ] Respiratory failure present [ ] Mechanical Ventilation [ ] Non-invasive ventilatory support [ ] High-Flow  [ ] Acute  [ ] Chronic [ ] Hypoxic  [ ] Hypercarbic [ ] Other  [ ] Other organ failure     LABS: no new labs      RADIOLOGY & ADDITIONAL STUDIES: no new imaging    PROTEIN CALORIE MALNUTRITION: [ ] mild [ x] moderate [ ] severe  [ ] underweight [ ] morbid obesity    https://www.andeal.org/vault/2440/web/files/ONC/Table_Clinical%20Characteristics%20to%20Document%20Malnutrition-White%20JV%20et%20al%718610.pdf    Height (cm): 152.4 (01-23-20 @ 13:10)  Weight (kg): 74 (01-23-20 @ 13:10)  BMI (kg/m2): 31.9 (01-23-20 @ 13:10)    [x ] PPSV2 < or = 30% [ ] significant weight loss [ x] poor nutritional intake [ ] anasarca Prealbumin, Serum: 14 mg/dL (01-30-20 @ 09:34)    Artificial Nutrition [ ]     REFERRALS:   [ ]Chaplaincy  [x ] Hospice  [ ]Child Life  [x ]Social Work  [ ]Case management [ ]Holistic Therapy [ ] Physical Therapy [ ] Dietary   Goals of Care Document: GAP TEAM PALLIATIVE CARE UNIT PROGRESS NOTE:      [  ] Patient on hospice program.    INDICATION FOR PALLIATIVE CARE UNIT SERVICES: Management of agitation, dyspnea at end of life    INTERVAL HPI/OVERNIGHT EVENTS: Morphine prn x 1 in the past 24 hours. Remains on ATC Morphine. Patient appears comfortable. Unresponsive on exam. Improved edema in the neck s/p 2 days Doxycycline. Remains in the dying process.    DNR on chart: Yes    Allergies    Allergy Status Unknown    Intolerances    MEDICATIONS  (STANDING):  cloNIDine Patch 0.1 mG/24Hr(s) 1 patch Transdermal every 7 days  doxycycline IVPB 100 milliGRAM(s) IV Intermittent every 12 hours  hydrALAZINE Injectable 10 milliGRAM(s) IV Push every 6 hours  levothyroxine Injectable 62.5 MICROGram(s) IV Push at bedtime  morphine  - Injectable 0.5 milliGRAM(s) IV Push every 6 hours  valproate sodium IVPB 500 milliGRAM(s) IV Intermittent two times a day    MEDICATIONS  (PRN):  acetaminophen  Suppository .. 650 milliGRAM(s) Rectal every 6 hours PRN Mild Pain (1 - 3)  LORazepam   Injectable 0.5 milliGRAM(s) IV Push every 1 hour PRN Agitation/Anxiety  morphine  - Injectable 1 milliGRAM(s) IV Push every 1 hour PRN Pain  morphine  - Injectable 1 milliGRAM(s) IV Push every 1 hour PRN Dyspnea    ITEMS UNCHECKED ARE NOT PRESENT    PRESENT SYMPTOMS: [x ]Unable to obtain due to poor mentation   Source if other than patient:  [ ]Family   [ ]Team     Pain: [ ] yes [ ] no  QOL impact -   Location -                    Aggravating factors -  Quality -  Radiation -  Timing-  Severity (0-10 scale):  Minimal acceptable level (0-10 scale):     Dyspnea:                           [ ]Mild [ ]Moderate [ ]Severe  Anxiety:                             [ ]Mild [ ]Moderate [ ]Severe  Fatigue:                             [ ]Mild [ ]Moderate [ ]Severe  Nausea:                             [ ]Mild [ ]Moderate [ ]Severe  Loss of appetite:              [ ]Mild [ ]Moderate [ ]Severe  Constipation:                    [ ]Mild [ ]Moderate [ ]Severe    PAINAD Score:0    [email or web address]/[email or web address] (Ctrl +  left click to view)  		  Other Symptoms:  [ ]All other review of systems negative     Palliative Performance Status Version 2:     10    %         http://npcrc.org/files/news/palliative_performance_scale_ppsv2.pdf    PHYSICAL EXAM:  Vital Signs Last 24 Hrs  T(C): 36.4 (14 Feb 2020 08:36), Max: 36.8 (13 Feb 2020 23:12)  T(F): 97.5 (14 Feb 2020 08:36), Max: 98.3 (13 Feb 2020 23:12)  HR: 89 (14 Feb 2020 08:36) (88 - 99)  BP: 128/74 (14 Feb 2020 08:36) (105/55 - 128/74)  BP(mean): --  RR: 20 (14 Feb 2020 08:36) (20 - 20)  SpO2: 95% (14 Feb 2020 08:36) (95% - 97%)    12 Feb 2020 07:01  -  13 Feb 2020 07:00  --------------------------------------------------------  IN: 0 mL / OUT: 150 mL / NET: -150 mL    GENERAL:  [ ]Alert  [ ]Oriented x   [ ]Lethargic  [ ]Cachexia  [x ]Unarousable  [ ]Verbal  [ ]Non-Verbal  Behavioral:   [ ] Anxiety  [ ] Delirium [ ] Agitation [ ] Other  HEENT:  [ ]Normal   [x ]Dry mouth   [ ]ET Tube/Trach  [ ]Oral lesions  PULMONARY:   [ ]Clear [ ]Tachypnea  [ ]Audible excessive secretions   [x ]Rhonchi        [ ]Right [ ]Left [ ]Bilateral  [ ]Crackles        [ ]Right [ ]Left [ ]Bilateral  [ ]Wheezing     [ ]Right [ ]Left [ ]Bilateral  [ ]Diminshed BS [ ]Right [ ]Left [ ]Bilateral    CARDIOVASCULAR:    [ x]Regular [ ]Irregular [ ]Tachy  [ ]Hung [ ]Murmur [ ]Other  GASTROINTESTINAL:  [x ]Soft  [ ]Distended   [x ]+BS  [x ]Non tender [ ]Tender  [ ]PEG [ ]OGT/ NGT   Last BM: 2/12  GENITOURINARY:  [ ]Normal [ ] Incontinent   [ ]Oliguria/Anuria   [ x]Siddiqi  MUSCULOSKELETAL:   [ ]Normal   [ ]Weakness  [x ]Bed/Wheelchair bound [x ]Edema  NEUROLOGIC: unresponsive  [ ]No focal deficits  [ ] Cognitive impairment  [ ] Dysphagia [ ]Dysarthria [ ] Paresis [ ]Other   SKIN: +induration and erythema to submandibular area on right side of neck; improved over past 2 days  [ ]Normal  [ ]Rash     [ ]Pressure ulcer(s)  [ ]y [ ]n  Present on admission      CRITICAL CARE:  [ ] Shock Present  [ ]Septic [ ]Cardiogenic [ ]Neurologic [ ]Hypovolemic  [ ]  Vasopressors [ ]  Inotropes   [ ] Respiratory failure present [ ] Mechanical Ventilation [ ] Non-invasive ventilatory support [ ] High-Flow  [ ] Acute  [ ] Chronic [ ] Hypoxic  [ ] Hypercarbic [ ] Other  [ ] Other organ failure     LABS: no new labs      RADIOLOGY & ADDITIONAL STUDIES: no new imaging    PROTEIN CALORIE MALNUTRITION: [ ] mild [ x] moderate [ ] severe  [ ] underweight [ ] morbid obesity    https://www.andeal.org/vault/2440/web/files/ONC/Table_Clinical%20Characteristics%20to%20Document%20Malnutrition-White%20JV%20et%20al%343188.pdf    Height (cm): 152.4 (01-23-20 @ 13:10)  Weight (kg): 74 (01-23-20 @ 13:10)  BMI (kg/m2): 31.9 (01-23-20 @ 13:10)    [x ] PPSV2 < or = 30% [ ] significant weight loss [ x] poor nutritional intake [ ] anasarca Prealbumin, Serum: 14 mg/dL (01-30-20 @ 09:34)    Artificial Nutrition [ ]     REFERRALS:   [ ]Chaplaincy  [x ] Hospice  [ ]Child Life  [x ]Social Work  [ ]Case management [ ]Holistic Therapy [ ] Physical Therapy [ ] Dietary   Goals of Care Document: GAP TEAM PALLIATIVE CARE UNIT PROGRESS NOTE:      [  ] Patient on hospice program.    INDICATION FOR PALLIATIVE CARE UNIT SERVICES: Management of agitation, dyspnea at end of life    INTERVAL HPI/OVERNIGHT EVENTS: Morphine prn x 1 in the past 24 hours. Remains on ATC Morphine. Patient appears comfortable. Unresponsive on exam. Improved edema in the neck s/p 2 days Doxycycline. Remains in the dying process.    DNR on chart: Yes    Allergies    Allergy Status Unknown    Intolerances    MEDICATIONS  (STANDING):  cloNIDine Patch 0.1 mG/24Hr(s) 1 patch Transdermal every 7 days  doxycycline IVPB 100 milliGRAM(s) IV Intermittent every 12 hours  hydrALAZINE Injectable 10 milliGRAM(s) IV Push every 6 hours  levothyroxine Injectable 62.5 MICROGram(s) IV Push at bedtime  morphine  - Injectable 0.5 milliGRAM(s) IV Push every 6 hours  valproate sodium IVPB 500 milliGRAM(s) IV Intermittent two times a day    MEDICATIONS  (PRN):  acetaminophen  Suppository .. 650 milliGRAM(s) Rectal every 6 hours PRN Mild Pain (1 - 3)  LORazepam   Injectable 0.5 milliGRAM(s) IV Push every 1 hour PRN Agitation/Anxiety  morphine  - Injectable 1 milliGRAM(s) IV Push every 1 hour PRN Pain  morphine  - Injectable 1 milliGRAM(s) IV Push every 1 hour PRN Dyspnea    ITEMS UNCHECKED ARE NOT PRESENT    PRESENT SYMPTOMS: [x ]Unable to obtain due to poor mentation   Source if other than patient:  [ ]Family   [ ]Team     Pain: [ ] yes [ ] no  QOL impact -   Location -                    Aggravating factors -  Quality -  Radiation -  Timing-  Severity (0-10 scale):  Minimal acceptable level (0-10 scale):     Dyspnea:                           [ ]Mild [ ]Moderate [ ]Severe  Anxiety:                             [ ]Mild [ ]Moderate [ ]Severe  Fatigue:                             [ ]Mild [ ]Moderate [ ]Severe  Nausea:                             [ ]Mild [ ]Moderate [ ]Severe  Loss of appetite:              [ ]Mild [ ]Moderate [ ]Severe  Constipation:                    [ ]Mild [ ]Moderate [ ]Severe    PAINAD Score:0    [email or web address] (Ctrl +  left click to view)  		  Other Symptoms:  [ ]All other review of systems negative     Palliative Performance Status Version 2:     10    %         http://npcrc.org/files/news/palliative_performance_scale_ppsv2.pdf    PHYSICAL EXAM:  Vital Signs Last 24 Hrs  T(C): 36.4 (14 Feb 2020 08:36), Max: 36.8 (13 Feb 2020 23:12)  T(F): 97.5 (14 Feb 2020 08:36), Max: 98.3 (13 Feb 2020 23:12)  HR: 89 (14 Feb 2020 08:36) (88 - 99)  BP: 128/74 (14 Feb 2020 08:36) (105/55 - 128/74)  BP(mean): --  RR: 20 (14 Feb 2020 08:36) (20 - 20)  SpO2: 95% (14 Feb 2020 08:36) (95% - 97%)    12 Feb 2020 07:01  -  13 Feb 2020 07:00  --------------------------------------------------------  IN: 0 mL / OUT: 150 mL / NET: -150 mL    GENERAL:  [ ]Alert  [ ]Oriented x   [ ]Lethargic  [ ]Cachexia  [x ]Unarousable  [ ]Verbal  [ ]Non-Verbal  Behavioral:   [ ] Anxiety  [ ] Delirium [ ] Agitation [ ] Other  HEENT:  [ ]Normal   [x ]Dry mouth   [ ]ET Tube/Trach  [ ]Oral lesions  PULMONARY:   [ ]Clear [ ]Tachypnea  [ ]Audible excessive secretions   [x ]Rhonchi        [ ]Right [ ]Left [ ]Bilateral  [ ]Crackles        [ ]Right [ ]Left [ ]Bilateral  [ ]Wheezing     [ ]Right [ ]Left [ ]Bilateral  [ ]Diminshed BS [ ]Right [ ]Left [ ]Bilateral    CARDIOVASCULAR:    [ x]Regular [ ]Irregular [ ]Tachy  [ ]Hung [ ]Murmur [ ]Other  GASTROINTESTINAL:  [x ]Soft  [ ]Distended   [x ]+BS  [x ]Non tender [ ]Tender  [ ]PEG [ ]OGT/ NGT   Last BM: 2/12  GENITOURINARY:  [ ]Normal [ ] Incontinent   [ ]Oliguria/Anuria   [ x]Siddiqi  MUSCULOSKELETAL:   [ ]Normal   [ ]Weakness  [x ]Bed/Wheelchair bound [x ]Edema  NEUROLOGIC: unresponsive  [ ]No focal deficits  [ ] Cognitive impairment  [ ] Dysphagia [ ]Dysarthria [ ] Paresis [ ]Other   SKIN: +induration and erythema to submandibular area on right side of neck; improved over past 2 days  [ ]Normal  [ ]Rash     [ ]Pressure ulcer(s)  [ ]y [ ]n  Present on admission      CRITICAL CARE:  [ ] Shock Present  [ ]Septic [ ]Cardiogenic [ ]Neurologic [ ]Hypovolemic  [ ]  Vasopressors [ ]  Inotropes   [ ] Respiratory failure present [ ] Mechanical Ventilation [ ] Non-invasive ventilatory support [ ] High-Flow  [ ] Acute  [ ] Chronic [ ] Hypoxic  [ ] Hypercarbic [ ] Other  [ ] Other organ failure     LABS: no new labs    RADIOLOGY & ADDITIONAL STUDIES: no new imaging    PROTEIN CALORIE MALNUTRITION: [ ] mild [ x] moderate [ ] severe  [ ] underweight [ ] morbid obesity    https://www.andeal.org/vault/2440/web/files/ONC/Table_Clinical%20Characteristics%20to%20Document%20Malnutrition-White%20JV%20et%20al%745023.pdf    Height (cm): 152.4 (01-23-20 @ 13:10)  Weight (kg): 74 (01-23-20 @ 13:10)  BMI (kg/m2): 31.9 (01-23-20 @ 13:10)    [x ] PPSV2 < or = 30% [ ] significant weight loss [ x] poor nutritional intake [ ] anasarca Prealbumin, Serum: 14 mg/dL (01-30-20 @ 09:34)    Artificial Nutrition [ ]     REFERRALS:   [ ]Chaplaincy  [x ] Hospice  [ ]Child Life  [x ]Social Work  [ ]Case management [ ]Holistic Therapy [ ] Physical Therapy [ ] Dietary   Goals of Care Document:

## 2020-02-14 NOTE — CHART NOTE - NSCHARTNOTEFT_GEN_A_CORE
Per discussion with palliative NP pt is not appropriate for nutrition follow up at this time, RD to remain available as needed.     Glenna Kelley R.D., [facility], Pager #[phone]

## 2020-02-15 NOTE — PROGRESS NOTE ADULT - PROBLEM SELECTOR PLAN 3
Progressive Dysphagia this hospital course likely exacerbated by AMS and acute CVA, failed speech and swallow eval 2/3.   No plan for artifical feeding. Mouth care only at this time.

## 2020-02-15 NOTE — PROGRESS NOTE ADULT - SUBJECTIVE AND OBJECTIVE BOX
GAP TEAM PALLIATIVE CARE UNIT PROGRESS NOTE:      [  ] Patient on hospice program.    INDICATION FOR PALLIATIVE CARE UNIT SERVICES:  Management of agitation, dyspnea at end of life    INTERVAL HPI/OVERNIGHT EVENTS: No acute events overnight. Received No PRN's overnight, patient Unresponsive to stimulus. Remains on morphine 0.5 mg IVP q 6 hr ATC      DNR on chart: Yes    Allergies    Allergy Status Unknown    Intolerances    MEDICATIONS  (STANDING):  cloNIDine Patch 0.1 mG/24Hr(s) 1 patch Transdermal every 7 days  doxycycline IVPB 100 milliGRAM(s) IV Intermittent every 12 hours  levothyroxine Injectable 62.5 MICROGram(s) IV Push at bedtime  morphine  - Injectable 0.5 milliGRAM(s) IV Push every 6 hours  valproate sodium IVPB 500 milliGRAM(s) IV Intermittent two times a day    MEDICATIONS  (PRN):  acetaminophen  Suppository .. 650 milliGRAM(s) Rectal every 6 hours PRN Mild Pain (1 - 3)  hydrALAZINE Injectable 10 milliGRAM(s) IV Push every 6 hours PRN systolic BP > 160  LORazepam   Injectable 0.5 milliGRAM(s) IV Push every 1 hour PRN Agitation/Anxiety  morphine  - Injectable 1 milliGRAM(s) IV Push every 1 hour PRN Pain  morphine  - Injectable 1 milliGRAM(s) IV Push every 1 hour PRN Dyspnea    ITEMS UNCHECKED ARE NOT PRESENT    PRESENT SYMPTOMS: [x ]Unable to obtain due to poor mentation   Source if other than patient:  [ ]Family   [ ]Team     Pain: [ ] yes [ ] no  QOL impact -   Location -                    Aggravating factors -  Quality -  Radiation -  Timing-  Severity (0-10 scale):  Minimal acceptable level (0-10 scale):     Dyspnea:                           [ ]Mild [ ]Moderate [ ]Severe  Anxiety:                             [ ]Mild [ ]Moderate [ ]Severe  Fatigue:                             [ ]Mild [ ]Moderate [ ]Severe  Nausea:                             [ ]Mild [ ]Moderate [ ]Severe  Loss of appetite:              [ ]Mild [ ]Moderate [ ]Severe  Constipation:                    [ ]Mild [ ]Moderate [ ]Severe    PAINAD Score: 0    [email or web address] (Ctrl +  left click to view)  		  Other Symptoms:  [ ]All other review of systems negative     Palliative Performance Status Version 2:        10 %         http://npcrc.org/files/news/palliative_performance_scale_ppsv2.pdf  PHYSICAL EXAM:  Vital Signs Last 24 Hrs  T(C): 36.3 (15 Feb 2020 08:02), Max: 36.3 (15 Feb 2020 08:02)  T(F): 97.3 (15 Feb 2020 08:02), Max: 97.3 (15 Feb 2020 08:02)  HR: 107 (15 Feb 2020 08:02) (94 - 107)  BP: 115/72 (15 Feb 2020 08:02) (115/72 - 115/72)  BP(mean): --  RR: 20 (15 Feb 2020 08:02) (20 - 20)  SpO2: 95% (15 Feb 2020 08:02) (95% - 99%) I&O's Summary    14 Feb 2020 07:01  -  15 Feb 2020 07:00  --------------------------------------------------------  IN: 0 mL / OUT: 300 mL / NET: -300 mL    15 Feb 2020 07:01  -  15 Feb 2020 17:19  --------------------------------------------------------  IN: 55 mL / OUT: 175 mL / NET: -120 mL      GENERAL:  [ ]Alert  [ ]Oriented x   [ ]Lethargic  [ ]Cachexia  [x ]Unarousable  [ ]Verbal  [ ]Non-Verbal  Behavioral:   [ ] Anxiety  [ ] Delirium [ ] Agitation [ ] Other  HEENT:  [ ]Normal   [ x]Dry mouth   [ ]ET Tube/Trach  [ ]Oral lesions  PULMONARY:   [ ]Clear [ ]Tachypnea  [ ]Audible excessive secretions   [ x]Rhonchi        [ ]Right [ ]Left [ ]Bilateral  [ ]Crackles        [ ]Right [ ]Left [ ]Bilateral  [ ]Wheezing     [ ]Right [ ]Left [ ]Bilateral  [ Diminished BS [ ]Right [ ]Left [ ]Bilateral    CARDIOVASCULAR:    [ x]Regular [ ]Irregular [ ]Tachy  [ ]Hung [ ]Murmur [ ]Other  GASTROINTESTINAL:  [ x]Soft  [ ]Distended   [x ]+BS  [ ]Non tender [ ]Tender  [ ]PEG [ ]OGT/ NGT   Last BM:  2/12      GENITOURINARY:  [ ]Normal [ ] Incontinent   [ ]Oliguria/Anuria   [x ]Siddiqi  MUSCULOSKELETAL:   [ ]Normal   [ ]Weakness  [x ]Bed/Wheelchair bound [x ]Edema  NEUROLOGIC:   [ ]No focal deficits  [x ] Cognitive impairment  [ ] Dysphagia [ ]Dysarthria [ ] Paresis [ ]Other    SKIN: +induration and erythema to submandibular area on right side of neck; improved over past 3 days  [ ]Normal  [ ]Rash     [ ]Pressure ulcer(s)  [ ]y [ ]n  Present on admission      CRITICAL CARE:  [ ] Shock Present  [ ]Septic [ ]Cardiogenic [ ]Neurologic [ ]Hypovolemic  [ ]  Vasopressors [ ]  Inotropes   [ ] Respiratory failure present [ ] Mechanical Ventilation [ ] Non-invasive ventilatory support [ ] High-Flow  [ ] Acute  [ ] Chronic [ ] Hypoxic  [ ] Hypercarbic [ ] Other  [ ] Other organ failure     LABS: None New       RADIOLOGY & ADDITIONAL STUDIES: None New    PROTEIN CALORIE MALNUTRITION: [ ] mild [x ] moderate [ ] severe  [ ] underweight [ ] morbid obesity    https://www.andeal.org/vault/2440/web/files/ONC/Table_Clinical%20Characteristics%20to%20Document%20Malnutrition-White%20JV%20et%20al%575426.pdf    Height (cm): 152.4 (01-23-20 @ 13:10)  Weight (kg): 74 (01-23-20 @ 13:10)  BMI (kg/m2): 31.9 (01-23-20 @ 13:10)    [x ] PPSV2 < or = 30% [ ] significant weight loss [ ] poor nutritional intake [ ] anasarca Prealbumin, Serum: 14 mg/dL (01-30-20 @ 09:34)    Artificial Nutrition [ ]     REFERRALS:   [ ]Chaplaincy  [x ] Hospice  [ ]Child Life  [x ]Social Work  [ ]Case management [ ]Holistic Therapy [ ] Physical Therapy [ ] Dietary   Goals of Care Document:

## 2020-02-15 NOTE — PROGRESS NOTE ADULT - PROBLEM SELECTOR PLAN 6
Meeting with son, daughter, and SW Isaías Calvo on 2/14/20 . Supportive care given. The children feel they are doing what is in line with the patient's wishes, however, feel overwhelmed by patient's prolonged dying process.     Plan to c/w antibiotics and managing symptoms and will re-discuss inpatient transition next week given patient's symptoms have escalated over the week and she appears to be in the dying process.    MOLST with DNR/DNI and HCP present in the chart.

## 2020-02-15 NOTE — PROGRESS NOTE ADULT - PROBLEM SELECTOR PLAN 1
Likely related to acute CVA, and prior history of advanced dementia, prior CVAs. Unresponsive at this time, however, appears comfortable.  No prn use of Ativan or Morphine in the past 24 hours.   Remains on morphine 0.5 mg IVP q 6 hr ATC  Depacon 500 mg IVSS q 12 hours for seizure prophylaxis.

## 2020-02-15 NOTE — PROGRESS NOTE ADULT - PROBLEM SELECTOR PLAN 2
Acute LMCA ischemic stroke with hemorrhagic conversion. No interventions offered. Poor neurological exam. Appears to be in the dying process.

## 2020-02-15 NOTE — PROGRESS NOTE ADULT - PROBLEM SELECTOR PLAN 4
c/w Doxycyline 100 mg IVSS ordered q 12 hours.  Morphine 0.5 mg ordered q 6 ATC for pain and prn dose increased to 1 mg on 2/12.   Warm compress PRN. Edema Improving  Robinul discontinued at this time.

## 2020-02-16 NOTE — PROGRESS NOTE ADULT - PROBLEM SELECTOR PLAN 1
Likely related to acute CVA, and prior history of advanced dementia, prior CVAs. Unresponsive at this time, however, appears comfortable.  No prn use of Ativan or Morphine in the past 24 hours.   Remains on morphine 0.5 mg IVP q 6 hr ATC and Morphine 1 mg IVP q hr PRN  Depacon 500 mg IVSS q 12 hours for seizure prophylaxis.

## 2020-02-16 NOTE — PROGRESS NOTE ADULT - SUBJECTIVE AND OBJECTIVE BOX
GAP TEAM PALLIATIVE CARE UNIT PROGRESS NOTE:      [  ] Patient on hospice program.    INDICATION FOR PALLIATIVE CARE UNIT SERVICES:  Management of agitation, dyspnea at end of life    INTERVAL HPI/OVERNIGHT EVENTS: No acute events overnight. Received No PRN's overnight,  Remains on morphine 0.5 mg IVP q 6 hr ATC and Morphine  1 mg IVP q 1 hr PRN       DNR on chart: Yes    Allergies    Allergy Status Unknown    Intolerances    MEDICATIONS  (STANDING):  cloNIDine Patch 0.1 mG/24Hr(s) 1 patch Transdermal every 7 days  doxycycline IVPB 100 milliGRAM(s) IV Intermittent every 12 hours  levothyroxine Injectable 62.5 MICROGram(s) IV Push at bedtime  morphine  - Injectable 0.5 milliGRAM(s) IV Push every 6 hours  valproate sodium IVPB 500 milliGRAM(s) IV Intermittent two times a day    MEDICATIONS  (PRN):  acetaminophen  Suppository .. 650 milliGRAM(s) Rectal every 6 hours PRN Mild Pain (1 - 3)  hydrALAZINE Injectable 10 milliGRAM(s) IV Push every 6 hours PRN systolic BP > 160  LORazepam   Injectable 0.5 milliGRAM(s) IV Push every 1 hour PRN Agitation/Anxiety  morphine  - Injectable 1 milliGRAM(s) IV Push every 1 hour PRN Pain  morphine  - Injectable 1 milliGRAM(s) IV Push every 1 hour PRN Dyspnea    ITEMS UNCHECKED ARE NOT PRESENT    PRESENT SYMPTOMS: [x ]Unable to obtain due to poor mentation   Source if other than patient:  [ ]Family   [ ]Team     Pain: [ ] yes [ ] no  QOL impact -   Location -                    Aggravating factors -  Quality -  Radiation -  Timing-  Severity (0-10 scale):  Minimal acceptable level (0-10 scale):     Dyspnea:                           [ ]Mild [ ]Moderate [ ]Severe  Anxiety:                             [ ]Mild [ ]Moderate [ ]Severe  Fatigue:                             [ ]Mild [ ]Moderate [ ]Severe  Nausea:                             [ ]Mild [ ]Moderate [ ]Severe  Loss of appetite:              [ ]Mild [ ]Moderate [ ]Severe  Constipation:                    [ ]Mild [ ]Moderate [ ]Severe    PAINAD Score: 0    [email or web address] (Ctrl +  left click to view)  		  Other Symptoms:  [ ]All other review of systems negative     Palliative Performance Status Version 2:        10 %         http://npcrc.org/files/news/palliative_performance_scale_ppsv2.pdf  PHYSICAL EXAM:  Vital Signs Last 24 Hrs  T(C): 36.5 (16 Feb 2020 07:34), Max: 36.5 (16 Feb 2020 07:34)  T(F): 97.7 (16 Feb 2020 07:34), Max: 97.7 (16 Feb 2020 07:34)  HR: 93 (16 Feb 2020 07:34) (93 - 93)  BP: 98/45 (16 Feb 2020 07:34) (98/45 - 98/45)  BP(mean): --  RR: 20 (16 Feb 2020 07:34) (20 - 20)  SpO2: 94% (16 Feb 2020 07:34) (94% - 94%) I&O's Summary    15 Feb 2020 07:01  -  16 Feb 2020 07:00  --------------------------------------------------------  IN: 55 mL / OUT: 325 mL / NET: -270 mL    16 Feb 2020 07:01  -  16 Feb 2020 14:36  --------------------------------------------------------  IN: 0 mL / OUT: 100 mL / NET: -100 mL          GENERAL:  [ ]Alert  [ ]Oriented x   [ ]Lethargic  [ ]Cachexia  [ ]Unarousable  [ ]Verbal  [ x]Non-Verbal  Behavioral:   [ ] Anxiety  [ ] Delirium [ ] Agitation [ ] Other  HEENT:  [ ]Normal   [ x]Dry mouth   [ ]ET Tube/Trach  [ ]Oral lesions  PULMONARY:   [ ]Clear [ ]Tachypnea  [ ]Audible excessive secretions   [ x]Rhonchi        [ ]Right [ ]Left [x ]Bilateral  [ ]Crackles        [ ]Right [ ]Left [ ]Bilateral  [ ]Wheezing     [ ]Right [ ]Left [ ]Bilateral  [ Diminished BS [ ]Right [ ]Left [ ]Bilateral    CARDIOVASCULAR:    [ x]Regular [ ]Irregular [ ]Tachy  [ ]Hung [ ]Murmur [ ]Other  GASTROINTESTINAL:  [ x]Soft  [ ]Distended   [x ]+BS  [ ]Non tender [ ]Tender  [ ]PEG [ ]OGT/ NGT   Last BM:  2/12      GENITOURINARY:  [ ]Normal [ ] Incontinent   [ ]Oliguria/Anuria   [x ]Siddiqi  MUSCULOSKELETAL:   [ ]Normal   [ ]Weakness  [x ]Bed/Wheelchair bound [x ]Edema  NEUROLOGIC:   [ ]No focal deficits  [x ] Cognitive impairment  [ ] Dysphagia [ ]Dysarthria [ ] Paresis [ ]Other    SKIN: +induration and erythema to submandibular area on right side of neck; improved over past 3 days  [ ]Normal  [ ]Rash     [ ]Pressure ulcer(s)  [ ]y [ ]n  Present on admission      CRITICAL CARE:  [ ] Shock Present  [ ]Septic [ ]Cardiogenic [ ]Neurologic [ ]Hypovolemic  [ ]  Vasopressors [ ]  Inotropes   [ ] Respiratory failure present [ ] Mechanical Ventilation [ ] Non-invasive ventilatory support [ ] High-Flow  [ ] Acute  [ ] Chronic [ ] Hypoxic  [ ] Hypercarbic [ ] Other  [ ] Other organ failure     LABS: None New      RADIOLOGY & ADDITIONAL STUDIES: None New    PROTEIN CALORIE MALNUTRITION: [ ] mild [x ] moderate [ ] severe  [ ] underweight [ ] morbid obesity    https://www.andeal.org/vault/2440/web/files/ONC/Table_Clinical%20Characteristics%20to%20Document%20Malnutrition-White%20JV%20et%20al%036484.pdf    Height (cm): 152.4 (01-23-20 @ 13:10)  Weight (kg): 74 (01-23-20 @ 13:10)  BMI (kg/m2): 31.9 (01-23-20 @ 13:10)    [x ] PPSV2 < or = 30% [ ] significant weight loss [ ] poor nutritional intake [ ] anasarca Prealbumin, Serum: 14 mg/dL (01-30-20 @ 09:34)    Artificial Nutrition [ ]     REFERRALS:   [ ]Chaplaincy  [ x] Hospice  [ ]Child Life  [ x]Social Work  [ ]Case management [ ]Holistic Therapy [ ] Physical Therapy [ ] Dietary   Goals of Care Document:

## 2020-02-16 NOTE — PROGRESS NOTE ADULT - REASON FOR ADMISSION
left MCA infarct
stroke
AMS  2/2 Left MCA infarct
Left MCA infarct

## 2020-02-17 NOTE — PROGRESS NOTE ADULT - SUBJECTIVE AND OBJECTIVE BOX
GAP TEAM PALLIATIVE CARE UNIT PROGRESS NOTE:      [  ] Patient on hospice program.    INDICATION FOR PALLIATIVE CARE UNIT SERVICES: Management of agitation, dyspnea at end of life    INTERVAL HPI/OVERNIGHT EVENTS: Patient appears comfortable. No acute events overnight. Received No PRN's overnight,  Remains on morphine 0.5 mg IVP q 6 hr ATC and Morphine  1 mg IVP q 1 hr PRN         DNR on chart: Yes    Allergies    Allergy Status Unknown    Intolerances    MEDICATIONS  (STANDING):  cloNIDine Patch 0.1 mG/24Hr(s) 1 patch Transdermal every 7 days  doxycycline IVPB 100 milliGRAM(s) IV Intermittent every 12 hours  levothyroxine Injectable 62.5 MICROGram(s) IV Push at bedtime  morphine  - Injectable 0.5 milliGRAM(s) IV Push every 6 hours  valproate sodium IVPB 500 milliGRAM(s) IV Intermittent two times a day    MEDICATIONS  (PRN):  acetaminophen  Suppository .. 650 milliGRAM(s) Rectal every 6 hours PRN Mild Pain (1 - 3)  hydrALAZINE Injectable 10 milliGRAM(s) IV Push every 6 hours PRN systolic BP > 160  LORazepam   Injectable 0.5 milliGRAM(s) IV Push every 1 hour PRN Agitation/Anxiety  morphine  - Injectable 1 milliGRAM(s) IV Push every 1 hour PRN Pain  morphine  - Injectable 1 milliGRAM(s) IV Push every 1 hour PRN Dyspnea    ITEMS UNCHECKED ARE NOT PRESENT    PRESENT SYMPTOMS: [ x]Unable to obtain due to poor mentation   Source if other than patient:  [ ]Family   [ ]Team     Pain: [ ] yes [ ] no  QOL impact -   Location -                    Aggravating factors -  Quality -  Radiation -  Timing-  Severity (0-10 scale):  Minimal acceptable level (0-10 scale):     Dyspnea:                           [ ]Mild [ ]Moderate [ ]Severe  Anxiety:                             [ ]Mild [ ]Moderate [ ]Severe  Fatigue:                             [ ]Mild [ ]Moderate [ ]Severe  Nausea:                             [ ]Mild [ ]Moderate [ ]Severe  Loss of appetite:              [ ]Mild [ ]Moderate [ ]Severe  Constipation:                    [ ]Mild [ ]Moderate [ ]Severe    PAINAD Score: 0    [email or web address]/[email or web address] (Ctrl +  left click to view)  		  Other Symptoms:  [x ]All other review of systems negative     Palliative Performance Status Version 2:       10  %         http://npcrc.org/files/news/palliative_performance_scale_ppsv2.pdf  PHYSICAL EXAM:  Vital Signs Last 24 Hrs  T(C): 36.3 (17 Feb 2020 08:09), Max: 36.3 (17 Feb 2020 08:09)  T(F): 97.4 (17 Feb 2020 08:09), Max: 97.4 (17 Feb 2020 08:09)  HR: 93 (17 Feb 2020 08:09) (93 - 93)  BP: 114/61 (17 Feb 2020 08:09) (114/61 - 114/61)  BP(mean): --  RR: 20 (17 Feb 2020 08:09) (20 - 20)  SpO2: 93% (17 Feb 2020 08:09) (93% - 93%) I&O's Summary    16 Feb 2020 07:01  -  17 Feb 2020 07:00  --------------------------------------------------------  IN: 0 mL / OUT: 150 mL / NET: -150 mL    17 Feb 2020 07:01  -  17 Feb 2020 16:30  --------------------------------------------------------  IN: 50 mL / OUT: 0 mL / NET: 50 mL    GENERAL:  [ ]Alert  [ ]Oriented x   [ ]Lethargic  [ ]Cachexia  [ ]Unarousable  [ ]Verbal  [ x]Non-Verbal  Behavioral:   [ ] Anxiety  [ ] Delirium [ ] Agitation [ ] Other  HEENT:  [ ]Normal   [ x]Dry mouth   [ ]ET Tube/Trach  [ ]Oral lesions  PULMONARY:   [ ]Clear [ ]Tachypnea  [ ]Audible excessive secretions   [ x]Rhonchi        [ ]Right [ ]Left [x ]Bilateral  [ ]Crackles        [ ]Right [ ]Left [ ]Bilateral  [ ]Wheezing     [ ]Right [ ]Left [ ]Bilateral  [ Diminished BS [ ]Right [ ]Left [ ]Bilateral    CARDIOVASCULAR:    [ x]Regular [ ]Irregular [ ]Tachy  [ ]Hung [ ]Murmur [ ]Other  GASTROINTESTINAL:  [ x]Soft  [ ]Distended   [x ]+BS  [ ]Non tender [ ]Tender  [ ]PEG [ ]OGT/ NGT   Last BM:  2/15  GENITOURINARY:  [ ]Normal [ ] Incontinent   [ ]Oliguria/Anuria   [x ]Siddiqi  MUSCULOSKELETAL:   [ ]Normal   [ ]Weakness  [x ]Bed/Wheelchair bound [x ]Edema  NEUROLOGIC:   [ ]No focal deficits  [x ] Cognitive impairment  [ ] Dysphagia [ ]Dysarthria [ ] Paresis [ ]Other    SKIN: +induration and erythema to submandibular area on right side of neck; improved   [ ]Normal  [ ]Rash   [x] Swelling and Brusing of the upper extremities.   [ ]Pressure ulcer(s)  [ ]y [ ]n  Present on admission        CRITICAL CARE:  [ ] Shock Present  [ ]Septic [ ]Cardiogenic [ ]Neurologic [ ]Hypovolemic  [ ]  Vasopressors [ ]  Inotropes   [ ] Respiratory failure present [ ] Mechanical Ventilation [ ] Non-invasive ventilatory support [ ] High-Flow  [ ] Acute  [ ] Chronic [ ] Hypoxic  [ ] Hypercarbic [ ] Other  [ ] Other organ failure     LABS: None New        RADIOLOGY & ADDITIONAL STUDIES: None New    PROTEIN CALORIE MALNUTRITION: [ ] mild [ x] moderate [ ] severe  [ ] underweight [ ] morbid obesity    https://www.andeal.org/vault/2440/web/files/ONC/Table_Clinical%20Characteristics%20to%20Document%20Malnutrition-White%20JV%20et%20al%048763.pdf    Height (cm): 152.4 (01-23-20 @ 13:10)  Weight (kg): 74 (01-23-20 @ 13:10)  BMI (kg/m2): 31.9 (01-23-20 @ 13:10)    [ x] PPSV2 < or = 30% [ ] significant weight loss [ ] poor nutritional intake [ ] anasarca Prealbumin, Serum: 14 mg/dL (01-30-20 @ 09:34)    Artificial Nutrition [ ]     REFERRALS:   [ ]Chaplaincy  [x ] Hospice  [ ]Child Life  [ x]Social Work  [ ]Case management [ ]Holistic Therapy [ ] Physical Therapy [ ] Dietary   Goals of Care Document:

## 2020-02-17 NOTE — PROGRESS NOTE ADULT - PROBLEM SELECTOR PLAN 6
Meeting with son, daughter, and SW Isaías Calvo on 2/14/20 . Supportive care given. The children feel they are doing what is in line with the patient's wishes, however, feel overwhelmed by patient's prolonged dying process.   MOLST with DNR/DNI and HCP present in the chart.

## 2020-02-18 NOTE — CHART NOTE - NSCHARTNOTEFT_GEN_A_CORE
Per discussion with palliative team, pt is not appropriate for malnutrition follow up at this time, RD to remain available as needed.     Glenna Kelley R.D., [facility], Pager #[phone]

## 2020-02-18 NOTE — PROGRESS NOTE ADULT - PROBLEM SELECTOR PLAN 2
Acute LMCA ischemic stroke with hemorrhagic conversion. No interventions offered. Poor neurological exam. Appears to be in the dying process. Acute LMCA ischemic stroke with hemorrhagic conversion. No interventions offered. Poor neurological exam. Remains in the dying process. Unresponsive and unable to take anything by mouth.

## 2020-02-18 NOTE — PROGRESS NOTE ADULT - PROBLEM SELECTOR PLAN 1
Likely related to acute CVA, and prior history of advanced dementia, prior CVAs. Unresponsive at this time, however, appears comfortable.  No prn use of Ativan or Morphine in the past 24 hours.   Remains on morphine 0.5 mg IVP q 6 hr ATC and Morphine 1 mg IVP q hr PRN  Depacon 500 mg IVSS q 12 hours for seizure prophylaxis. Likely related to acute CVA, and prior history of advanced dementia, prior CVAs. Unresponsive at this time, however, appears comfortable.  No prn use of Ativan or Morphine in the past 24 hours.   Remains on morphine 0.5 mg IVP q 6 hr ATC and Morphine 1 mg prn.  Depacon 500 mg IVSS q 12 hours for seizure prophylaxis.

## 2020-02-18 NOTE — PROGRESS NOTE ADULT - PROBLEM SELECTOR PROBLEM 3
Atrial fibrillation, unspecified type
Dysphagia
Functional quadriplegia
Functional quadriplegia
Dysphagia

## 2020-02-18 NOTE — PROGRESS NOTE ADULT - ATTENDING COMMENTS
I have personally seen and examined this patient and agree with the above assessment and plan, which I have reviewed and edited where appropriate.     GOC; Management of symptoms during the dying process in the setting of advanced dementia/cva  Symptoms: Dyspnea, agitation  Disposition: Family unable to care for patient at home due to personal health concerns.  Hospice has declined for inpatient.  Patient now actively dying and not stable for discharge.     Family has been educated as to what to expect.  Questions answered.  Emotional support provided.

## 2020-02-18 NOTE — PROGRESS NOTE ADULT - PROBLEM SELECTOR PLAN 4
c/w Doxycyline 100 mg IVSS ordered q 12 hours.  Morphine 0.5 mg ordered q 6 ATC for pain and prn dose increased to 1 mg on 2/12.   Warm compress PRN. Edema Improving  Robinul discontinued at this time. PPSV2 10%. Bedbound and full nursing care.

## 2020-02-18 NOTE — PROGRESS NOTE ADULT - PROBLEM SELECTOR PROBLEM 2
Delirium
Cerebrovascular accident (CVA) due to embolism of left middle cerebral artery

## 2020-02-18 NOTE — PROGRESS NOTE ADULT - ASSESSMENT
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 Patient was transferred to PCU for symptom management, mainly agitation and anticipatory management of symptoms. Patient continues to decline, no PO intake, and requiring Haldol ATC for control of agitation. Family to decide if they would like to pursue a Hospice Referral.     No prn use of medication in the past 24 hours. Remains on ATC Morphine. Appears to be in the dying process. 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 Patient was transferred to PCU for symptom management, mainly agitation and anticipatory management of symptoms. Patient continues to decline, no PO intake, and requiring Haldol ATC for control of agitation. Family to decide if they would like to pursue a Hospice Referral.     No prn use of medication in the past 24 hours. Remains on ATC Morphine. Appears comfortable. Remains unresponsive. 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 Patient was transferred to PCU for symptom management, mainly agitation and anticipatory management of symptoms. Patient continues to decline, no PO intake, and requiring Haldol ATC for control of agitation.   No prn use of medication in the past 24 hours. Remains on ATC Morphine. Appears comfortable. Remains unresponsive.

## 2020-02-18 NOTE — PROGRESS NOTE ADULT - PROBLEM SELECTOR PLAN 5
PPSV2 10%. requires total care Meeting with son, daughter, and SW Isaías Calvo on 2/14/20 . Supportive care given. The children feel they are doing what is in line with the patient's wishes, however, feel overwhelmed by patient's prolonged dying process. C/w supportive care.   MOLST with DNR/DNI and HCP present in the chart. MOLST with DNR/DNI and HCP present in the chart.  Family unwilling to take patient home due to their personal health concerns.

## 2020-02-18 NOTE — PROGRESS NOTE ADULT - PROBLEM SELECTOR PROBLEM 1
Cerebrovascular accident (CVA) due to embolism of left middle cerebral artery
Agitation
Dysphagia, unspecified type
Dysphagia, unspecified type
Delirium
Delirium
Agitation
Delirium

## 2020-02-18 NOTE — PROGRESS NOTE ADULT - SUBJECTIVE AND OBJECTIVE BOX
GAP TEAM PALLIATIVE CARE UNIT PROGRESS NOTE:      [  ] Patient on hospice program.    INDICATION FOR PALLIATIVE CARE UNIT SERVICES: Management of agitation, dyspnea at end of life    INTERVAL HPI/OVERNIGHT EVENTS: No prn use of medication in the past 24 hours. Remains unresponsive. Remains on ATC Morphine. Agonal respirations. No further swelling or erythema to neck. Doxycycline course completed.      DNR on chart: Yes    Allergies    Allergy Status Unknown    Intolerances    MEDICATIONS  (STANDING):  levothyroxine Injectable 62.5 MICROGram(s) IV Push at bedtime  morphine  - Injectable 0.5 milliGRAM(s) IV Push every 6 hours  valproate sodium IVPB 500 milliGRAM(s) IV Intermittent two times a day    MEDICATIONS  (PRN):  acetaminophen  Suppository .. 650 milliGRAM(s) Rectal every 6 hours PRN Mild Pain (1 - 3)  bisacodyl Suppository 10 milliGRAM(s) Rectal daily PRN Constipation  LORazepam   Injectable 0.5 milliGRAM(s) IV Push every 1 hour PRN Agitation/Anxiety  morphine  - Injectable 1 milliGRAM(s) IV Push every 1 hour PRN Pain  morphine  - Injectable 1 milliGRAM(s) IV Push every 1 hour PRN Dyspnea      ITEMS UNCHECKED ARE NOT PRESENT    PRESENT SYMPTOMS: [ x]Unable to obtain due to poor mentation   Source if other than patient:  [ ]Family   [ ]Team     Pain: [ ] yes [ ] no  QOL impact -   Location -                    Aggravating factors -  Quality -  Radiation -  Timing-  Severity (0-10 scale):  Minimal acceptable level (0-10 scale):     Dyspnea:                           [ ]Mild [ ]Moderate [ ]Severe  Anxiety:                             [ ]Mild [ ]Moderate [ ]Severe  Fatigue:                             [ ]Mild [ ]Moderate [ ]Severe  Nausea:                             [ ]Mild [ ]Moderate [ ]Severe  Loss of appetite:              [ ]Mild [ ]Moderate [ ]Severe  Constipation:                    [ ]Mild [ ]Moderate [ ]Severe    PAINAD Score: 0    http://geriatrictoolkit.[employer].edu/cog/painad.pdf (Ctrl +  left click to view)  		  Other Symptoms:  [x ]All other review of systems negative     Palliative Performance Status Version 2:       10  %         http://npcrc.org/files/news/palliative_performance_scale_[email or web address].pdf  PHYSICAL EXAM:  Vital Signs Last 24 Hrs  T(C): 36.1 (18 Feb 2020 08:31), Max: 36.1 (18 Feb 2020 08:31)  T(F): 97 (18 Feb 2020 08:31), Max: 97 (18 Feb 2020 08:31)  HR: 95 (18 Feb 2020 08:31) (95 - 95)  BP: 92/52 (18 Feb 2020 08:31) (92/52 - 92/52)  BP(mean): --  RR: 12 (18 Feb 2020 08:31) (12 - 12)  SpO2: 92% (18 Feb 2020 08:31) (92% - 92%)    16 Feb 2020 07:01  -  17 Feb 2020 07:00  --------------------------------------------------------  IN: 0 mL / OUT: 150 mL / NET: -150 mL    17 Feb 2020 07:01  -  17 Feb 2020 16:30  --------------------------------------------------------  IN: 50 mL / OUT: 0 mL / NET: 50 mL    GENERAL:  [ ]Alert  [ ]Oriented x   [ ]Lethargic  [ ]Cachexia  [x ]Unarousable  [ ]Verbal  [ x]Non-Verbal  Behavioral:   [ ] Anxiety  [ ] Delirium [ ] Agitation [ ] Other  HEENT:  [ ]Normal   [ x]Dry mouth   [ ]ET Tube/Trach  [ ]Oral lesions  PULMONARY:   [ ]Clear [ ]Tachypnea  [ ]Audible excessive secretions   [ x]Rhonchi        [ ]Right [ ]Left [x ]Bilateral  [ ]Crackles        [ ]Right [ ]Left [ ]Bilateral  [ ]Wheezing     [ ]Right [ ]Left [ ]Bilateral  [ Diminished BS [ ]Right [ ]Left [ ]Bilateral    CARDIOVASCULAR:    [ x]Regular [ ]Irregular [ ]Tachy  [ ]Hung [ ]Murmur [ ]Other  GASTROINTESTINAL:  [ x]Soft  [ ]Distended   [x ]+BS  [ ]Non tender [ ]Tender  [ ]PEG [ ]OGT/ NGT   Last BM:  2/12/20  GENITOURINARY:  [ ]Normal [ ] Incontinent   [ ]Oliguria/Anuria   [x ]Siddiqi  MUSCULOSKELETAL:   [ ]Normal   [ ]Weakness  [x ]Bed/Wheelchair bound [x ]Edema  NEUROLOGIC:   [ ]No focal deficits  [x ] Cognitive impairment  [ ] Dysphagia [ ]Dysarthria [ ] Paresis [ ]Other    SKIN: +induration and erythema to submandibular area on right side of neck; improved   [ ]Normal  [ ]Rash   [x] Swelling and Bruising of the upper extremities.   [ ]Pressure ulcer(s)  [ ]y [ ]n  Present on admission        CRITICAL CARE:  [ ] Shock Present  [ ]Septic [ ]Cardiogenic [ ]Neurologic [ ]Hypovolemic  [ ]  Vasopressors [ ]  Inotropes   [ ] Respiratory failure present [ ] Mechanical Ventilation [ ] Non-invasive ventilatory support [ ] High-Flow  [ ] Acute  [ ] Chronic [ ] Hypoxic  [ ] Hypercarbic [ ] Other  [ ] Other organ failure     LABS: None New        RADIOLOGY & ADDITIONAL STUDIES: None New    PROTEIN CALORIE MALNUTRITION: [ ] mild [ x] moderate [ ] severe  [ ] underweight [ ] morbid obesity    https://www.andeal.org/vault/2440/web/files/ONC/Table_Clinical%20Characteristics%20to%20Document%20Malnutrition-White%20JV%20et%20al%066312.pdf    Height (cm): 152.4 (01-23-20 @ 13:10)  Weight (kg): 74 (01-23-20 @ 13:10)  BMI (kg/m2): 31.9 (01-23-20 @ 13:10)    [ x] PPSV2 < or = 30% [ ] significant weight loss [ ] poor nutritional intake [ ] anasarca Prealbumin, Serum: 14 mg/dL (01-30-20 @ 09:34)    Artificial Nutrition [ ]     REFERRALS:   [ ]Chaplaincy  [x ] Hospice  [ ]Child Life  [ x]Social Work  [ ]Case management [ ]Holistic Therapy [ ] Physical Therapy [ ] Dietary   Goals of Care Document: GAP TEAM PALLIATIVE CARE UNIT PROGRESS NOTE:      [  ] Patient on hospice program.    INDICATION FOR PALLIATIVE CARE UNIT SERVICES: Management of agitation, dyspnea at end of life in setting of advanced dementia and cva    INTERVAL HPI/OVERNIGHT EVENTS: No prn use of medication in the past 24 hours. Remains unresponsive. Remains on ATC Morphine. Agonal respirations. No further swelling or erythema to neck. Doxycycline course completed.      DNR on chart: Yes    Allergies    Allergy Status Unknown    Intolerances    MEDICATIONS  (STANDING):  levothyroxine Injectable 62.5 MICROGram(s) IV Push at bedtime  morphine  - Injectable 0.5 milliGRAM(s) IV Push every 6 hours  valproate sodium IVPB 500 milliGRAM(s) IV Intermittent two times a day    MEDICATIONS  (PRN):  acetaminophen  Suppository .. 650 milliGRAM(s) Rectal every 6 hours PRN Mild Pain (1 - 3)  bisacodyl Suppository 10 milliGRAM(s) Rectal daily PRN Constipation  LORazepam   Injectable 0.5 milliGRAM(s) IV Push every 1 hour PRN Agitation/Anxiety  morphine  - Injectable 1 milliGRAM(s) IV Push every 1 hour PRN Pain  morphine  - Injectable 1 milliGRAM(s) IV Push every 1 hour PRN Dyspnea      ITEMS UNCHECKED ARE NOT PRESENT    PRESENT SYMPTOMS: [ x]Unable to obtain verbally due to poor mentation   Source if other than patient:  [ ]Family   [ ]Team     Pain: [ ] yes [x ] no  QOL impact -   Location -                    Aggravating factors -  Quality -  Radiation -  Timing-  Severity (0-10 scale):  Minimal acceptable level (0-10 scale):     Dyspnea:                           [ ]Mild [ ]Moderate [ ]Severe  Anxiety:                             [ ]Mild [ ]Moderate [ ]Severe  Fatigue:                             [ ]Mild [ ]Moderate [ ]Severe  Nausea:                             [ ]Mild [ ]Moderate [ ]Severe  Loss of appetite:              [ ]Mild [ ]Moderate [ ]Severe  Constipation:                    [ ]Mild [ ]Moderate [ ]Severe    PAINAD Score: 0    http://geriatrictoolkit.[employer].edu/cog/painad.pdf (Ctrl +  left click to view)  		  Other Symptoms:  [x ]All other review of systems negative     Palliative Performance Status Version 2:       10  %         [email or web address]/files/news/palliative_performance_scale_[email or web address].pdf  PHYSICAL EXAM:  Vital Signs Last 24 Hrs  T(C): 36.1 (18 Feb 2020 08:31), Max: 36.1 (18 Feb 2020 08:31)  T(F): 97 (18 Feb 2020 08:31), Max: 97 (18 Feb 2020 08:31)  HR: 95 (18 Feb 2020 08:31) (95 - 95)  BP: 92/52 (18 Feb 2020 08:31) (92/52 - 92/52)  BP(mean): --  RR: 12 (18 Feb 2020 08:31) (12 - 12)  SpO2: 92% (18 Feb 2020 08:31) (92% - 92%)    16 Feb 2020 07:01  -  17 Feb 2020 07:00  --------------------------------------------------------  IN: 0 mL / OUT: 150 mL / NET: -150 mL    17 Feb 2020 07:01  -  17 Feb 2020 16:30  --------------------------------------------------------  IN: 50 mL / OUT: 0 mL / NET: 50 mL    GENERAL:  [ ]Alert  [ ]Oriented x   [ ]Lethargic  [ ]Cachexia  [x ]Unarousable  [ ]Verbal  [ x]Non-Verbal  Behavioral:   [ ] Anxiety  [ ] Delirium [ ] Agitation [ ] Other  HEENT:  [ ]Normal   [ x]Dry mouth   [ ]ET Tube/Trach  [ ]Oral lesions lips cyanotic  PULMONARY:   [ ]Clear [ ]Tachypnea  [ ]Audible excessive secretions   [ x]Rhonchi        [ ]Right [ ]Left [x ]Bilateral  [ ]Crackles        [ ]Right [ ]Left [ ]Bilateral  [ ]Wheezing     [ ]Right [ ]Left [ ]Bilateral  [ Diminished BS [ ]Right [ ]Left [ ]Bilateral    CARDIOVASCULAR:    [ x]Regular [ ]Irregular [ ]Tachy  [ ]Hung [ ]Murmur [ ]Other  GASTROINTESTINAL:  [ x]Soft  [ ]Distended   [x ]+BS  [x ]Non tender [ ]Tender  [ ]PEG [ ]OGT/ NGT   Last BM:  2/12/20  GENITOURINARY:  [ ]Normal [x ] Incontinent   [ ]Oliguria/Anuria   [x ]Siddiqi  MUSCULOSKELETAL:   [ ]Normal   [ ]Weakness  [x ]Bed/Wheelchair bound [x ]Edema  NEUROLOGIC:   [ ]No focal deficits  [x ] Cognitive impairment  [ ] Dysphagia [ ]Dysarthria [ ] Paresis [ ]Other    SKIN: +induration and erythema to submandibular area on right side of neck; improved   [ ]Normal  [ ]Rash   [x] Swelling and Bruising of the upper extremities.   [ ]Pressure ulcer(s)  [ ]y [ ]n  Present on admission        CRITICAL CARE:  [ ] Shock Present  [ ]Septic [ ]Cardiogenic [ ]Neurologic [ ]Hypovolemic  [ ]  Vasopressors [ ]  Inotropes   [ ] Respiratory failure present [ ] Mechanical Ventilation [ ] Non-invasive ventilatory support [ ] High-Flow  [ ] Acute  [ ] Chronic [ ] Hypoxic  [ ] Hypercarbic [ ] Other  [ ] Other organ failure     LABS: None New        RADIOLOGY & ADDITIONAL STUDIES: None New    PROTEIN CALORIE MALNUTRITION: [ ] mild [ x] moderate [ ] severe  [ ] underweight [ ] morbid obesity    https://www.andeal.org/vault/2440/web/files/ONC/Table_Clinical%20Characteristics%20to%20Document%20Malnutrition-White%20JV%20et%20al%265996.pdf    Height (cm): 152.4 (01-23-20 @ 13:10)  Weight (kg): 74 (01-23-20 @ 13:10)  BMI (kg/m2): 31.9 (01-23-20 @ 13:10)    [ x] PPSV2 < or = 30% [ ] significant weight loss [ ] poor nutritional intake [ ] anasarca Prealbumin, Serum: 14 mg/dL (01-30-20 @ 09:34)    Artificial Nutrition [ ]     REFERRALS:   [ ]Chaplaincy  [x ] Hospice  [ ]Child Life  [ x]Social Work  [ ]Case management [ ]Holistic Therapy [ ] Physical Therapy [ ] Dietary   Goals of Care Document:

## 2020-02-19 NOTE — DISCHARGE NOTE FOR THE EXPIRED PATIENT - HOSPITAL COURSE
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 LKN after interviewed several family members in person and over the phone is 5:00 PM on 2020. At baseline she ambulated with a walker and sometimes required 1 person assist. At 5:00 PM on 2020, she had been seen ambulating at her baseline and speaking, and then acutely became weaker (was not even able to stand up from couch) and became confused, and was asking odd questions and was disoriented. The morning of presentation, she was also noted to have a R facial droop, and family called EMS after she was seen ambulating to the bathroom (unclear if she was weak then), and then she was found down in the bathroom and was unable to get up. Fall itself was not witnessed. She was also found in the ED to be in Afib.       Palliative was called for goals of care in the setting of AMS and for nutritional goals. The family did not want pursue artifical nutrition and wanted supportive and comfort care in the setting of a devastating CVA. Patient was brought to the PCU for management of intermittent agitation. Symptoms in the PCU were managed with prn Haldol and Ativan. Dyspnea and pain were managed with ATC and prn Morphine. Patient was also treated with IV Doxycyline for sialadinitis. Patient remained with inability to take anything by mouth. She  in the PCU in the morning on 20.

## 2020-02-25 RX ORDER — ARIPIPRAZOLE 15 MG/1
1 TABLET ORAL
Qty: 0 | Refills: 0 | DISCHARGE

## 2020-02-25 RX ORDER — ERGOCALCIFEROL 1.25 MG/1
1 CAPSULE ORAL
Qty: 0 | Refills: 0 | DISCHARGE

## 2020-02-25 RX ORDER — DIVALPROEX SODIUM 500 MG/1
1 TABLET, DELAYED RELEASE ORAL
Qty: 0 | Refills: 0 | DISCHARGE

## 2020-02-25 RX ORDER — LABETALOL HCL 100 MG
1 TABLET ORAL
Qty: 0 | Refills: 0 | DISCHARGE

## 2020-02-25 RX ORDER — ATORVASTATIN CALCIUM 80 MG/1
1 TABLET, FILM COATED ORAL
Qty: 0 | Refills: 0 | DISCHARGE

## 2020-02-25 RX ORDER — LEVOTHYROXINE SODIUM 125 MCG
1 TABLET ORAL
Qty: 0 | Refills: 0 | DISCHARGE

## 2020-02-25 RX ORDER — ASPIRIN/CALCIUM CARB/MAGNESIUM 324 MG
1 TABLET ORAL
Qty: 0 | Refills: 0 | DISCHARGE

## 2020-09-23 NOTE — ED ADULT NURSE NOTE - CHIEF COMPLAINT
Patient advised of referral authorized patient denies questions or concerns. The patient is a 89y Female complaining of

## 2020-12-10 NOTE — PROGRESS NOTE ADULT - PROBLEM/PLAN-7
Patient tolerated procedure well.
Patient tolerated procedure well.
DISPLAY PLAN FREE TEXT

## 2021-05-03 NOTE — PATIENT PROFILE ADULT - NSPROSPIRITUALVALUESFT_GEN_A_NUR
Vital Signs Last 24 Hrs  T(C): 36.9 (03 May 2021 15:35), Max: 36.9 (03 May 2021 15:35)  T(F): 98.42 (03 May 2021 15:35), Max: 98.42 (03 May 2021 15:35)  HR: 86 (03 May 2021 16:28) (82 - 86)  BP: 138/80 (03 May 2021 16:28) (135/81 - 144/85)  RR: 18 (03 May 2021 15:35) (18 - 18)    Gen: NAD, comfortable  CV  Pulm  Abd: Gravid  Ext: No edema noted    FHT: 140s baseline, moderate variability, -accels, -deccels  [place]:  SVE: Vital Signs Last 24 Hrs  T(C): 36.9 (03 May 2021 15:35), Max: 36.9 (03 May 2021 15:35)  T(F): 98.42 (03 May 2021 15:35), Max: 98.42 (03 May 2021 15:35)  HR: 86 (03 May 2021 16:28) (82 - 86)  BP: 138/80 (03 May 2021 16:28) (135/81 - 144/85)  RR: 18 (03 May 2021 15:35) (18 - 18)    Gen: NAD  CV: RRR  Pulm: CTAB, breathing comfortably on RA  Abd: Gravid  Ext: No edema noted    FHT: 140s baseline, moderate variability, -accels, -deccels  [place]:  SVE: Vital Signs Last 24 Hrs  T(C): 36.9 (03 May 2021 16:39), Max: 36.9 (03 May 2021 15:35)  T(F): 98.4 (03 May 2021 16:39), Max: 98.42 (03 May 2021 15:35)  HR: 90 (03 May 2021 17:50) (80 - 114)  BP: 152/91 (03 May 2021 17:50) (135/81 - 154/111)  BP(mean): --  RR: 18 (03 May 2021 16:39) (18 - 18)  SpO2: --    Gen: NAD  CV: RRR  Pulm: CTAB, breathing comfortably on RA  Abd: Gravid  Ext: No edema noted    FHT: 140s baseline, moderate variability, -accels, -deccels  [place]: Rare ctxs  SVE: 2/80/-2 Vital Signs Last 24 Hrs  T(C): 36.9 (03 May 2021 16:39), Max: 36.9 (03 May 2021 15:35)  T(F): 98.4 (03 May 2021 16:39), Max: 98.42 (03 May 2021 15:35)  HR: 90 (03 May 2021 17:50) (80 - 114)  BP: 152/91 (03 May 2021 17:50) (135/81 - 154/111)  RR: 18 (03 May 2021 16:39) (18 - 18)    Physical Exam:  Gen: NAD  CV: RRR  Pulm: CTAB, breathing comfortably on RA  Abd: Gravid  Ext: No edema noted    FHT: 140s baseline, moderate variability, -accels, -deccels  [place]: Rare ctxs  SVE: 2/80/-2 n/a

## 2022-01-01 NOTE — PROGRESS NOTE ADULT - PROBLEM SELECTOR PLAN 6
Continued GOC discussions and DC planning with family. Patient requires Haldol IV ATC for agitation. Will need to readdress Hospice referral, Family to decide if they would like to pursue a Hospice Referral Family meeting scheduled and planned for Wednesday 2/12/20 at 11 am Statement Selected

## 2022-02-17 NOTE — CONSULT NOTE ADULT - CONSULT REQUESTED DATE/TIME
Hpi Title: Evaluation of Skin Lesions
01-Feb-2020 21:33
23-Jan-2020 17:02
24-Jan-2020
24-Jan-2020 19:34
26-Jan-2020 11:21
27-Jan-2020 19:52
29-Jan-2020 13:30
How Severe Are Your Spot(S)?: moderate
24-Jan-2020 21:59

## 2022-12-06 NOTE — PATIENT PROFILE ADULT - NSPROGENANESREACTION_GEN_A_NUR
Received fax from pharmacy requesting refill on pts medication(s). Pt was last seen in office on 9/29/2022  and has a follow up scheduled for 12/12/2022. Will send request to  Naaman Babinski  for authorization.      Requested Prescriptions     Pending Prescriptions Disp Refills    brexpiprazole (REXULTI) 0.5 MG TABS tablet 30 tablet 5     Sig: Take 1 tablet by mouth at bedtime 6/6/2018 no previous reaction

## 2024-07-14 NOTE — PATIENT PROFILE ADULT. - NS PRO CONTRA FLU 1
"Based on the data that has already been returned, I suspect PCOS is highly likely.  We are still waiting for 2 more blood test, but I figured I would send you a summary of what we have already.  If you are interested in starting treatment, let me know and I would be happy to help.  If you are interested in getting a bit more aggressive, I would be more than happy to send you to an endocrinologist to discuss this further.    1. Sugar, kidney, liver, electrolytes normal:     - Sugar: Normal blood glucose levels suggest there is no immediate concern for diabetes or insulin resistance.     - Kidney: Normal kidney function tests indicate the kidneys are working well, filtering blood and maintaining fluid and electrolyte balance.     - Liver: Normal liver function tests indicate that the liver is not showing signs of damage or disease.     - Electrolytes: Normal electrolyte levels suggest a balanced state of essential minerals like sodium, potassium, chloride, and bicarbonate.    2. Cholesterol slightly elevated with  and triglycerides 160:     - LDL (Low-Density Lipoprotein): Known as \"bad\" cholesterol. An LDL level of 121 mg/dL is slightly above the optimal range (<100 mg/dL is ideal), indicating a need for dietary adjustments and lifestyle changes to prevent cardiovascular disease.     - Triglycerides: A level of 160 mg/dL is also slightly elevated (normal is <150 mg/dL). High triglycerides can increase the risk of heart disease and may be influenced by diet, weight, and physical activity.    3. Sex Hormone Binding Globulin (SHBG) low normal at 29:     - SHBG: This protein binds to sex hormones, such as testosterone and estrogen. Low normal SHBG can be associated with PCOS, as lower levels of SHBG can result in higher levels of free androgens (male hormones) in the blood, which is a feature of PCOS.    4. Prolactin is 8.8:     - Prolactin: A hormone that stimulates milk production. Normal levels (usually less " than 25 ng/mL in non-pregnant women) suggest that there are no issues with prolactin secretion, such as a prolactinoma (a benign pituitary tumor).    5. FSH (Follicle Stimulating Hormone) 1.3:     - FSH: This hormone is crucial for reproductive processes. FSH levels are usually measured on day 3 of the menstrual cycle. Normal FSH levels vary, but typically range from 3.5 to 12.5 mIU/mL during the early follicular phase. A low FSH level like 1.3 could be indicative of anovulation or other ovarian function issues, which can be seen in PCOS.    6. LH (Luteinizing Hormone) 4.0:     - LH: This hormone triggers ovulation and the development of the corpus luteum. Normal levels vary depending on the cycle phase. In PCOS, the LH to FSH ratio is often elevated (typically greater than 2:1). Here, the LH level is higher than FSH, but not dramatically so.    7. TSH (Thyroid Stimulating Hormone) 2.50:     - TSH: This hormone regulates thyroid function. A TSH level of 2.50 mIU/L is within the normal reference range (typically 0.4-4.0 mIU/L). This suggests that the thyroid is functioning normally and there is no overt hypothyroidism or hyperthyroidism.    Summary: The results show normal glucose, kidney, liver, and electrolyte levels, which is reassuring for overall metabolic health. The slightly elevated cholesterol and triglycerides suggest a need for lifestyle modification. The low normal SHBG, combined with the relatively low FSH and the normal LH, might suggest a hormonal imbalance that could be consistent with PCOS. The normal prolactin and TSH levels are good signs that there are no other hormonal abnormalities like hyperprolactinemia or thyroid dysfunction. out of season (available sept 1 thru apr 2 only)